# Patient Record
Sex: FEMALE | Race: WHITE | HISPANIC OR LATINO | Employment: FULL TIME | URBAN - METROPOLITAN AREA
[De-identification: names, ages, dates, MRNs, and addresses within clinical notes are randomized per-mention and may not be internally consistent; named-entity substitution may affect disease eponyms.]

---

## 2017-02-15 ENCOUNTER — MYC MEDICAL ADVICE (OUTPATIENT)
Dept: INTERNAL MEDICINE | Facility: CLINIC | Age: 62
End: 2017-02-15

## 2017-03-20 ENCOUNTER — OFFICE VISIT (OUTPATIENT)
Dept: INTERNAL MEDICINE | Facility: CLINIC | Age: 62
End: 2017-03-20
Payer: COMMERCIAL

## 2017-03-20 VITALS
SYSTOLIC BLOOD PRESSURE: 110 MMHG | HEART RATE: 80 BPM | OXYGEN SATURATION: 99 % | WEIGHT: 135 LBS | DIASTOLIC BLOOD PRESSURE: 78 MMHG | TEMPERATURE: 98 F | BODY MASS INDEX: 24.84 KG/M2 | HEIGHT: 62 IN

## 2017-03-20 DIAGNOSIS — J45.20 MILD INTERMITTENT ASTHMA WITHOUT COMPLICATION: Primary | ICD-10-CM

## 2017-03-20 DIAGNOSIS — E03.9 ACQUIRED HYPOTHYROIDISM: ICD-10-CM

## 2017-03-20 PROCEDURE — 99213 OFFICE O/P EST LOW 20 MIN: CPT | Performed by: INTERNAL MEDICINE

## 2017-03-20 RX ORDER — MONTELUKAST SODIUM 10 MG/1
10 TABLET ORAL AT BEDTIME
Qty: 30 TABLET | Refills: 1 | Status: SHIPPED | OUTPATIENT
Start: 2017-03-20 | End: 2017-08-02

## 2017-03-20 RX ORDER — LEVOTHYROXINE SODIUM 88 UG/1
88 TABLET ORAL DAILY
Qty: 90 TABLET | Refills: 3 | Status: SHIPPED | OUTPATIENT
Start: 2017-03-20 | End: 2018-04-03

## 2017-03-20 NOTE — NURSING NOTE
"Chief Complaint   Patient presents with     Asthma       Initial /78  Pulse 80  Temp 98  F (36.7  C) (Oral)  Ht 5' 2\" (1.575 m)  Wt 135 lb (61.2 kg)  LMP 12/01/2007  SpO2 99%  BMI 24.69 kg/m2 Estimated body mass index is 24.69 kg/(m^2) as calculated from the following:    Height as of this encounter: 5' 2\" (1.575 m).    Weight as of this encounter: 135 lb (61.2 kg).  Medication Reconciliation: complete    "

## 2017-03-20 NOTE — PROGRESS NOTES
SUBJECTIVE:                                                    Su Tinoco is a 61 year old female who presents to clinic today for the following health issues:    Asthma: she stopped her hydroxyzine because she was reading it can be related to dementia. She is very worried about this. She is taking zyrtec but not helping as much. She has some allergy symptoms also despite the zyrtec with some postnasal drainage.   Her symptoms are not severe.   She would like to avoid steroid inhaler if possible.     Patient Active Problem List   Diagnosis     Allergic rhinitis     Hypothyroidism     Migraine     Mild intermittent asthma     CARDIOVASCULAR SCREENING; LDL GOAL LESS THAN 160     BRONWYN (generalized anxiety disorder)     Spinal stenosis of lumbar region     Spondylolisthesis at L5-S1 level     Trochanteric bursitis of right hip     Controlled substance agreement signed     Current Outpatient Prescriptions   Medication Sig Dispense Refill     levothyroxine (SYNTHROID/LEVOTHROID) 88 MCG tablet Take 1 tablet (88 mcg) by mouth daily 90 tablet 3     traMADol (ULTRAM) 50 MG tablet 1 tablet every 6-8 hours as needed 75 tablet 1     omeprazole (PRILOSEC) 20 MG capsule Take 1 capsule (20 mg) by mouth 2 times daily 180 capsule 0     traZODone (DESYREL) 100 MG tablet Take 1.5 tablets (150 mg) by mouth At Bedtime 135 tablet 3     buPROPion (WELLBUTRIN XL) 300 MG 24 hr tablet Take 1 tablet (300 mg) by mouth every morning 90 tablet 3     EPINEPHrine (EPIPEN) 0.3 MG/0.3ML injection Inject 0.3 mLs (0.3 mg) into the muscle once as needed for anaphylaxis 0.6 mL 11     PREMARIN vaginal cream   1     albuterol (ALBUTEROL) 108 (90 BASE) MCG/ACT inhaler Inhale 1-2 puffs into the lungs every 6 hours as needed 2 Inhaler 11      Reviewed and updated as needed this visit by clinical staff  Tobacco  Allergies  Meds  Problems  Med Hx  Surg Hx  Fam Hx  Soc Hx        Reviewed and updated as needed this visit by Provider         ROS:  No  "fever, chills, chest pain, sinus pain    OBJECTIVE:                                                    /78  Pulse 80  Temp 98  F (36.7  C) (Oral)  Ht 5' 2\" (1.575 m)  Wt 135 lb (61.2 kg)  LMP 12/01/2007  SpO2 99%  BMI 24.69 kg/m2  Body mass index is 24.69 kg/(m^2).    Lungs: mild decrease breath sounds and mild delayed expiration with minimal wheezes with forced expiration       ASSESSMENT/PLAN:                                                            1. Mild intermittent asthma without complication  Recommend add singulair. If not controlled better would then go back with steroid inhaler.   - montelukast (SINGULAIR) 10 MG tablet; Take 1 tablet (10 mg) by mouth At Bedtime  Dispense: 30 tablet; Refill: 1    2. Acquired hypothyroidism  Refill due  - levothyroxine (SYNTHROID/LEVOTHROID) 88 MCG tablet; Take 1 tablet (88 mcg) by mouth daily  Dispense: 90 tablet; Refill: 3        Jessica Smith MD  [facility]    "

## 2017-03-20 NOTE — MR AVS SNAPSHOT
"              After Visit Summary   3/20/2017    Su Tinoco    MRN: [id number]           Patient Information     Date Of Birth          1955        Visit Information        Provider Department      3/20/2017 4:40 PM Jessica Smith MD [facility]        Today's Diagnoses     Mild intermittent asthma without complication    -  1    Acquired hypothyroidism           Follow-ups after your visit        Who to contact     If you have questions or need follow up information about today's clinic visit or your schedule please contact [facility] directly at [phone].  Normal or non-critical lab and imaging results will be communicated to you by NEBOTRADEhart, letter or phone within 4 business days after the clinic has received the results. If you do not hear from us within 7 days, please contact the clinic through [employer] or phone. If you have a critical or abnormal lab result, we will notify you by phone as soon as possible.  Submit refill requests through Selftrade or call your pharmacy and they will forward the refill request to us. Please allow 3 business days for your refill to be completed.          Additional Information About Your Visit        MyChart Information     Sel[employer] gives you secure access to your electronic health record. If you see a primary care provider, you can also send messages to your care team and make appointments. If you have questions, please call your primary care clinic.  If you do not have a primary care provider, please call [phone] and they will assist you.        Care EveryWhere ID     This is your Care EveryWhere ID. This could be used by other organizations to access your [place] medical records  [id number]        Your Vitals Were     Pulse Temperature Height Last Period Pulse Oximetry BMI (Body Mass Index)    80 98  F (36.7  C) (Oral) 5' 2\" (1.575 m) 12/01/2007 99% 24.69 kg/m2       Blood Pressure from Last 3 Encounters:   03/20/17 110/78 "   08/18/16 102/78   08/11/16 100/80    Weight from Last 3 Encounters:   03/20/17 135 lb (61.2 kg)   08/18/16 130 lb (59 kg)   08/11/16 130 lb (59 kg)              Today, you had the following     No orders found for display         Today's Medication Changes          These changes are accurate as of: 3/20/17  5:20 PM.  If you have any questions, ask your nurse or doctor.               Start taking these medicines.        Dose/Directions    montelukast 10 MG tablet   Commonly known as:  SINGULAIR   Used for:  Mild intermittent asthma without complication   Started by:  Jessica Smith MD        Dose:  10 mg   Take 1 tablet (10 mg) by mouth At Bedtime   Quantity:  30 tablet   Refills:  1         Stop taking these medicines if you haven't already. Please contact your care team if you have questions.     hydrOXYzine 25 MG tablet   Commonly known as:  ATARAX   Stopped by:  Jessica Smith MD                Where to get your medicines      These medications were sent to [facility] [place] [id number] [facility]  [address] [facility] [address]     Phone:  [phone]     levothyroxine 88 MCG tablet         These medications were sent to [facility] [address] [place] [address] [address] AT [place] [address] & [facility]  [address] [facility] [place] [address]     Phone:  [phone]     montelukast 10 MG tablet                Primary Care Provider Office Phone # Fax #    Jessica Smith [phone]       [facility] [address]  [facility] [address]        Thank you!     Thank you for choosing [facility]  for your care. Our goal is always to provide you with excellent care. Hearing back from our patients is one way we can continue to improve our services. Please take a few minutes to complete the written survey that you may receive in the mail after your visit with us. Thank you!             Your Updated Medication List -  Protect others around you: Learn how to safely use, store and throw away your medicines at www.disposemymeds.org.          This list is accurate as of: 3/20/17  5:20 PM.  Always use your most recent med list.                   Brand Name Dispense Instructions for use    albuterol 108 (90 BASE) MCG/ACT Inhaler    albuterol    2 Inhaler    Inhale 1-2 puffs into the lungs every 6 hours as needed       buPROPion 300 MG 24 hr tablet    WELLBUTRIN XL    90 tablet    Take 1 tablet (300 mg) by mouth every morning       EPINEPHrine 0.3 MG/0.3ML injection     0.6 mL    Inject 0.3 mLs (0.3 mg) into the muscle once as needed for anaphylaxis       levothyroxine 88 MCG tablet    SYNTHROID/LEVOTHROID    90 tablet    Take 1 tablet (88 mcg) by mouth daily       montelukast 10 MG tablet    SINGULAIR    30 tablet    Take 1 tablet (10 mg) by mouth At Bedtime       omeprazole 20 MG CR capsule    priLOSEC    180 capsule    Take 1 capsule (20 mg) by mouth 2 times daily       PREMARIN cream   Generic drug:  conjugated estrogens          traMADol 50 MG tablet    ULTRAM    75 tablet    1 tablet every 6-8 hours as needed       traZODone 100 MG tablet    DESYREL    135 tablet    Take 1.5 tablets (150 mg) by mouth At Bedtime

## 2017-03-24 ASSESSMENT — ASTHMA QUESTIONNAIRES: ACT_TOTALSCORE: 14

## 2017-05-11 ENCOUNTER — HOSPITAL ENCOUNTER (OUTPATIENT)
Dept: ULTRASOUND IMAGING | Facility: CLINIC | Age: 62
Discharge: HOME | End: 2017-05-11
Attending: OBSTETRICS & GYNECOLOGY | Admitting: OBSTETRICS & GYNECOLOGY
Payer: COMMERCIAL

## 2017-05-11 ENCOUNTER — HOSPITAL ENCOUNTER (OUTPATIENT)
Dept: MAMMOGRAPHY | Facility: CLINIC | Age: 62
End: 2017-05-11
Attending: OBSTETRICS & GYNECOLOGY
Payer: COMMERCIAL

## 2017-05-11 DIAGNOSIS — R92.8 ABNORMAL MAMMOGRAM: ICD-10-CM

## 2017-05-11 PROCEDURE — G0279 TOMOSYNTHESIS, MAMMO: HCPCS

## 2017-05-11 PROCEDURE — 76642 ULTRASOUND BREAST LIMITED: CPT | Mod: RT

## 2017-05-12 DIAGNOSIS — M43.17 SPONDYLOLISTHESIS AT L5-S1 LEVEL: ICD-10-CM

## 2017-05-12 DIAGNOSIS — M48.061 SPINAL STENOSIS OF LUMBAR REGION: ICD-10-CM

## 2017-05-12 DIAGNOSIS — Z79.899 CONTROLLED SUBSTANCE AGREEMENT SIGNED: ICD-10-CM

## 2017-05-12 RX ORDER — TRAMADOL HYDROCHLORIDE 50 MG/1
TABLET ORAL
Qty: 75 TABLET | Refills: 1 | Status: SHIPPED | OUTPATIENT
Start: 2017-05-12 | End: 2018-02-05

## 2017-05-12 NOTE — TELEPHONE ENCOUNTER
Tramadol      Last Written Prescription Date:  09/27/16  Last Fill Quantity: 75,   # refills: 1  Last Office Visit with [facility] or  Health prescribing provider: 03/20/17  Future Office visit:       Routing refill request to provider for review/approval because:  Drug not on the [facility] or [facility] refill protocol or controlled substance

## 2017-07-07 DIAGNOSIS — F41.1 GAD (GENERALIZED ANXIETY DISORDER): ICD-10-CM

## 2017-07-07 DIAGNOSIS — G47.00 INSOMNIA, UNSPECIFIED: ICD-10-CM

## 2017-07-07 RX ORDER — TRAZODONE HYDROCHLORIDE 100 MG/1
150 TABLET ORAL AT BEDTIME
Qty: 135 TABLET | Refills: 3 | Status: SHIPPED | OUTPATIENT
Start: 2017-07-07 | End: 2018-10-30

## 2017-07-07 RX ORDER — BUPROPION HYDROCHLORIDE 300 MG/1
300 TABLET ORAL EVERY MORNING
Qty: 90 TABLET | Refills: 3 | Status: SHIPPED | OUTPATIENT
Start: 2017-07-07 | End: 2018-09-04

## 2017-07-07 NOTE — TELEPHONE ENCOUNTER
Pt past due for labs/yearly appt      Trazodone    Last Written Prescription Date: 4/25/16  Last Fill Quantity: 135; # refills: 3  Last Office Visit with [facility] or  Health prescribing provider:  3/20/17        Last PHQ-9 score on record= No flowsheet data found.    Lab Results   Component Value Date    AST 29 03/15/2008     Lab Results   Component Value Date    ALT 17 03/15/2008       Labs showing if normal/abnormal  Lab Results   Component Value Date    AST 29 03/15/2008    ALT 17 03/15/2008     Bupropion        Last Written Prescription Date: 4/25/16  Last Fill Quantity: 90; # refills: 3  Last Office Visit with [facility] or [facility] prescribing provider:  3/20/17        Last PHQ-9 score on record= No flowsheet data found.    Lab Results   Component Value Date    AST 29 03/15/2008     Lab Results   Component Value Date    ALT 17 03/15/2008       Labs showing if normal/abnormal  Lab Results   Component Value Date    AST 29 03/15/2008    ALT 17 03/15/2008

## 2017-08-01 ENCOUNTER — OFFICE VISIT (OUTPATIENT)
Dept: URGENT CARE | Facility: URGENT CARE | Age: 62
End: 2017-08-01
Payer: COMMERCIAL

## 2017-08-01 VITALS
DIASTOLIC BLOOD PRESSURE: 70 MMHG | SYSTOLIC BLOOD PRESSURE: 120 MMHG | TEMPERATURE: 97.8 F | OXYGEN SATURATION: 98 % | HEART RATE: 70 BPM

## 2017-08-01 DIAGNOSIS — T14.8XXA PUNCTURE WOUND: Primary | ICD-10-CM

## 2017-08-01 PROCEDURE — 90714 TD VACC NO PRESV 7 YRS+ IM: CPT | Performed by: PHYSICIAN ASSISTANT

## 2017-08-01 PROCEDURE — 99213 OFFICE O/P EST LOW 20 MIN: CPT | Performed by: PHYSICIAN ASSISTANT

## 2017-08-01 RX ORDER — CEPHALEXIN 500 MG/1
500 CAPSULE ORAL 3 TIMES DAILY
Qty: 30 CAPSULE | Refills: 0 | Status: SHIPPED | OUTPATIENT
Start: 2017-08-01 | End: 2017-09-14

## 2017-08-01 NOTE — PROGRESS NOTES
SUBJECTIVE:  Chief Complaint   Patient presents with     Imm/Inj     pt states she need a tetnus shot, due to a gerardo tumb tack x this morning      Su Tinoco is a 62 year old female presents with a chief complaint of left foot puncture wound.  The injury occurred 1 day(s) ago.   The injury happened while at home. How: punture wound.  The patient complained of mild pain  and has not had decreased ROM.  Pain exacerbated by weight-bearing.  Relieved by rest.  She treated it initially with no therapy. This is the first time this type of injury has occurred to this patient.     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      Mild intermittent asthma     sees allergist     Other specified disorders of thyroid      Spinal stenosis of lumbar region     L5, S1     Spondylolisthesis at L5-S1 level      Sprain of unspecified site of back      Trochanteric bursitis of right hip      Unspecified hypothyroidism      Allergies   Allergen Reactions     Nuts Anaphylaxis     peanuts, nara nuts     Compazine      Lock jaw, vision problems and shakes     Erythromycin GI Disturbance     Prochlorperazine      Jaw spasm and vision loss     Social History   Substance Use Topics     Smoking status: Never Smoker     Smokeless tobacco: Never Used     Alcohol use Yes      Comment: rarely- once a month       ROS:  CONSTITUTIONAL:NEGATIVE for fever, chills, change in weight  INTEGUMENTARY/SKIN: POSITIVE for puncture wound  MUSCULOSKELETAL: positive for tenderness of bottom of foot  NEURO: NEGATIVE for weakness, dizziness or paresthesias    EXAM:   /70 (BP Location: Left arm, Patient Position: Chair, Cuff Size: Adult Regular)  Pulse 70  Temp 97.8  F (36.6  C) (Oral)  LMP 12/01/2007  SpO2 98%  Gen: healthy,alert,no distress  Extremity: foot has point tenderness .   There is not compromise to the distal circulation.  Pulses are +2 and CRT is  brisk  GENERAL APPEARANCE: healthy, alert and no distress  EXTREMITIES: peripheral pulses normal  MS: no gross deformities noted, no evidence of inflammation in joints, FROM in all extremities.  SKIN: Positive for small puncture wound  NEURO: Normal strength and tone, sensory exam grossly normal, mentation intact and speech normal      ASSESSMENT/PLAN:      ICD-10-CM    1. Puncture wound T14.8 TD PRESERV FREE >=7 YRS ADS IM     cephALEXin (KEFLEX) 500 MG capsule       Foot soaks  Motrin  Follow up as needed

## 2017-08-01 NOTE — MR AVS SNAPSHOT
After Visit Summary   8/1/2017    Su Tinoco    MRN: [id number]           Patient Information     Date Of Birth          1955        Visit Information        Provider Department      8/1/2017 2:05 PM Turner Perez PA-C [facility]        Today's Diagnoses     Puncture wound    -  1       Follow-ups after your visit        Your next 10 appointments already scheduled     Sep 14, 2017  8:00 AM CDT   [facility] Visit with Jessica Smith MD   [facility]    [address]  [facility] [id number] [phone]           Bring a current list of meds and any records pertaining to this visit. For Physicals, please bring immunization records and any forms needing to be filled out. Please arrive 10 minutes early to complete paperwork.              Who to contact     If you have questions or need follow up information about today's clinic visit or your schedule please contact [facility] directly at [phone].  Normal or non-critical lab and imaging results will be communicated to you by ARKeXhart, letter or phone within 4 business days after the clinic has received the results. If you do not hear from us within 7 days, please contact the clinic through logtrustt or phone. If you have a critical or abnormal lab result, we will notify you by phone as soon as possible.  Submit refill requests through OONi or call your pharmacy and they will forward the refill request to us. Please allow 3 business days for your refill to be completed.          Additional Information About Your Visit        ARKeXhart Information     OONi gives you secure access to your electronic health record. If you see a primary care provider, you can also send messages to your care team and make appointments. If you have questions, please call your primary care clinic.  If you do not have a primary care provider, please call  [phone] and they will assist you.        Care EveryWhere ID     This is your Care EveryWhere ID. This could be used by other organizations to access your [place] medical records  [id number]        Your Vitals Were     Pulse Temperature Last Period Pulse Oximetry          70 97.8  F (36.6  C) (Oral) 12/01/2007 98%         Blood Pressure from Last 3 Encounters:   08/01/17 120/70   03/20/17 110/78   08/18/16 102/78    Weight from Last 3 Encounters:   03/20/17 135 lb (61.2 kg)   08/18/16 130 lb (59 kg)   08/11/16 130 lb (59 kg)              We Performed the Following     TD PRESERV FREE >=7 YRS ADS IM          Today's Medication Changes          These changes are accurate as of: 8/1/17  3:37 PM.  If you have any questions, ask your nurse or doctor.               Start taking these medicines.        Dose/Directions    * cephALEXin 500 MG capsule   Commonly known as:  KEFLEX   Used for:  Puncture wound   Started by:  Turner Perez PA-C        Dose:  500 mg   Take 1 capsule (500 mg) by mouth 3 times daily   Quantity:  30 capsule   Refills:  0       * cephALEXin 500 MG capsule   Commonly known as:  KEFLEX   Used for:  Puncture wound   Started by:  Turner Perez PA-C        Dose:  500 mg   Take 1 capsule (500 mg) by mouth 3 times daily   Quantity:  30 capsule   Refills:  0       * Notice:  This list has 2 medication(s) that are the same as other medications prescribed for you. Read the directions carefully, and ask your doctor or other care provider to review them with you.         Where to get your medicines      Some of these will need a paper prescription and others can be bought over the counter.  Ask your nurse if you have questions.     Bring a paper prescription for each of these medications     cephALEXin 500 MG capsule    cephALEXin 500 MG capsule                Primary Care Provider Office Phone # Fax #    Jessica Smith [phone]       [facility] [address]  [facility] [address]        Equal Access to Services     [facility] : Hadii bruno castillo tanya Easley, wafarihada luqadaha, qaybta kaalmateressa hernandez, christina ramirezkristelanton nicholson. So [facility] [phone].    ATENCIÓN: Si habla español, tiene a cole disposición servicios gratuitos de asistencia lingüística. Meyame al [phone].    We comply with applicable federal civil rights laws and [place] laws. We do not discriminate on the basis of race, color, national origin, age, disability sex, sexual orientation or gender identity.            Thank you!     Thank you for choosing [facility]  for your care. Our goal is always to provide you with excellent care. Hearing back from our patients is one way we can continue to improve our services. Please take a few minutes to complete the written survey that you may receive in the mail after your visit with us. Thank you!             Your Updated Medication List - Protect others around you: Learn how to safely use, store and throw away your medicines at www.disposemymeds.org.          This list is accurate as of: 8/1/17  3:37 PM.  Always use your most recent med list.                   Brand Name Dispense Instructions for use Diagnosis    albuterol 108 (90 BASE) MCG/ACT Inhaler    albuterol    2 Inhaler    Inhale 1-2 puffs into the lungs every 6 hours as needed    Mild intermittent asthma, uncomplicated       buPROPion 300 MG 24 hr tablet    WELLBUTRIN XL    90 tablet    Take 1 tablet (300 mg) by mouth every morning    BRONWYN (generalized anxiety disorder)       * cephALEXin 500 MG capsule    KEFLEX    30 capsule    Take 1 capsule (500 mg) by mouth 3 times daily    Puncture wound       * cephALEXin 500 MG capsule    KEFLEX    30 capsule    Take 1 capsule (500 mg) by mouth 3 times daily    Puncture wound       EPINEPHrine 0.3 MG/0.3ML injection 2-pack    EPIPEN/ADRENACLICK/or ANY BX GENERIC EQUIV    0.6 mL    Inject 0.3 mLs (0.3 mg) into the  muscle once as needed for anaphylaxis    Anaphylactic reaction due to peanuts, initial encounter       levothyroxine 88 MCG tablet    SYNTHROID/LEVOTHROID    90 tablet    Take 1 tablet (88 mcg) by mouth daily    Acquired hypothyroidism       montelukast 10 MG tablet    SINGULAIR    30 tablet    Take 1 tablet (10 mg) by mouth At Bedtime    Mild intermittent asthma without complication       omeprazole 20 MG CR capsule    priLOSEC    180 capsule    Take 1 capsule (20 mg) by mouth 2 times daily    Gastritis       PREMARIN cream   Generic drug:  conjugated estrogens           traMADol 50 MG tablet    ULTRAM    75 tablet    1 tablet every 6-8 hours as needed    Spinal stenosis of lumbar region, Spondylolisthesis at L5-S1 level       traZODone 100 MG tablet    DESYREL    135 tablet    Take 1.5 tablets (150 mg) by mouth At Bedtime    Insomnia, unspecified       * Notice:  This list has 2 medication(s) that are the same as other medications prescribed for you. Read the directions carefully, and ask your doctor or other care provider to review them with you.

## 2017-08-01 NOTE — NURSING NOTE
"Chief Complaint   Patient presents with     Imm/Inj     pt states she need a tetnus shot, due to a gerardo tumb tack x this morning        Initial /70 (BP Location: Left arm, Patient Position: Chair, Cuff Size: Adult Regular)  Pulse 70  Temp 97.8  F (36.6  C) (Oral)  LMP 12/01/2007  SpO2 98% Estimated body mass index is 24.69 kg/(m^2) as calculated from the following:    Height as of 3/20/17: 5' 2\" (1.575 m).    Weight as of 3/20/17: 135 lb (61.2 kg).  Medication Reconciliation: complete    "

## 2017-08-02 ENCOUNTER — TELEPHONE (OUTPATIENT)
Dept: INTERNAL MEDICINE | Facility: CLINIC | Age: 62
End: 2017-08-02

## 2017-08-02 DIAGNOSIS — J45.20 MILD INTERMITTENT ASTHMA WITHOUT COMPLICATION: ICD-10-CM

## 2017-08-02 RX ORDER — MONTELUKAST SODIUM 10 MG/1
10 TABLET ORAL AT BEDTIME
Qty: 90 TABLET | Refills: 0 | Status: SHIPPED | OUTPATIENT
Start: 2017-08-02 | End: 2017-12-09

## 2017-08-02 RX ORDER — MONTELUKAST SODIUM 10 MG/1
10 TABLET ORAL AT BEDTIME
Qty: 30 TABLET | Refills: 0 | Status: SHIPPED | OUTPATIENT
Start: 2017-08-02 | End: 2017-08-02

## 2017-08-02 NOTE — TELEPHONE ENCOUNTER
Singulair refill request. . Pt states this helps. When she stops it, her symptoms returned, but not right away.     She needs it today. Will fax in but will route to Dr Smiht as DAYLIN.          Sent to [facility], then to mail order.   Last Written Prescription Date: 3/20/17  Last Fill Quantity: 30, # refills: 1      Last [facility] Visit with [facility] or [facility] prescribing provider:  3/20/17     Future Office Visit:    Next 5 appointments (look out 90 days)     Sep 14, 2017  8:00 AM CDT   [facility]    [address]  [facility] [address] [phone]                   Date of Last Asthma Action Plan Letter:   Asthma Action Plan Q1 Year    Topic Date Due     Asthma Action Plan - yearly  04/25/2017      Asthma Control Test:   ACT Total Scores 3/23/2017   ACT TOTAL SCORE -   ASTHMA ER VISITS -   ASTHMA HOSPITALIZATIONS -   ACT TOTAL SCORE (Goal Greater than or Equal to 20) 14   In the past 12 months, how many times did you visit the emergency room for your asthma without being admitted to the hospital? 0   In the past 12 months, how many times were you hospitalized overnight because of your asthma? 0       Date of Last Spirometry Test:   No results found for this or any previous visit.

## 2017-09-04 DIAGNOSIS — J45.20 MILD INTERMITTENT ASTHMA WITHOUT COMPLICATION: ICD-10-CM

## 2017-09-05 RX ORDER — MONTELUKAST SODIUM 10 MG/1
TABLET ORAL
Qty: 30 TABLET | Refills: 0 | OUTPATIENT
Start: 2017-09-05

## 2017-09-14 ENCOUNTER — OFFICE VISIT (OUTPATIENT)
Dept: INTERNAL MEDICINE | Facility: CLINIC | Age: 62
End: 2017-09-14
Payer: COMMERCIAL

## 2017-09-14 VITALS
HEART RATE: 74 BPM | OXYGEN SATURATION: 99 % | SYSTOLIC BLOOD PRESSURE: 92 MMHG | WEIGHT: 136 LBS | HEIGHT: 62 IN | RESPIRATION RATE: 16 BRPM | TEMPERATURE: 97.9 F | BODY MASS INDEX: 25.03 KG/M2 | DIASTOLIC BLOOD PRESSURE: 66 MMHG

## 2017-09-14 DIAGNOSIS — Z12.11 SPECIAL SCREENING FOR MALIGNANT NEOPLASMS, COLON: ICD-10-CM

## 2017-09-14 DIAGNOSIS — E03.9 ACQUIRED HYPOTHYROIDISM: ICD-10-CM

## 2017-09-14 DIAGNOSIS — Z00.00 ENCOUNTER FOR ROUTINE ADULT HEALTH EXAMINATION WITHOUT ABNORMAL FINDINGS: Primary | ICD-10-CM

## 2017-09-14 DIAGNOSIS — F41.1 GAD (GENERALIZED ANXIETY DISORDER): ICD-10-CM

## 2017-09-14 DIAGNOSIS — J45.20 MILD INTERMITTENT ASTHMA WITHOUT COMPLICATION: ICD-10-CM

## 2017-09-14 LAB
ANION GAP SERPL CALCULATED.3IONS-SCNC: 6 MMOL/L (ref 3–14)
BUN SERPL-MCNC: 19 MG/DL (ref 7–30)
CALCIUM SERPL-MCNC: 9.2 MG/DL (ref 8.5–10.1)
CHLORIDE SERPL-SCNC: 105 MMOL/L (ref 94–109)
CHOLEST SERPL-MCNC: 186 MG/DL
CO2 SERPL-SCNC: 29 MMOL/L (ref 20–32)
CREAT SERPL-MCNC: 0.86 MG/DL (ref 0.52–1.04)
GFR SERPL CREATININE-BSD FRML MDRD: 67 ML/MIN/1.7M2
GLUCOSE SERPL-MCNC: 81 MG/DL (ref 70–99)
HDLC SERPL-MCNC: 100 MG/DL
LDLC SERPL CALC-MCNC: 74 MG/DL
NONHDLC SERPL-MCNC: 86 MG/DL
POTASSIUM SERPL-SCNC: 4.3 MMOL/L (ref 3.4–5.3)
SODIUM SERPL-SCNC: 140 MMOL/L (ref 133–144)
TRIGL SERPL-MCNC: 59 MG/DL
TSH SERPL DL<=0.005 MIU/L-ACNC: 0.68 MU/L (ref 0.4–4)

## 2017-09-14 PROCEDURE — 99396 PREV VISIT EST AGE 40-64: CPT | Performed by: INTERNAL MEDICINE

## 2017-09-14 PROCEDURE — 36415 COLL VENOUS BLD VENIPUNCTURE: CPT | Performed by: INTERNAL MEDICINE

## 2017-09-14 PROCEDURE — 80061 LIPID PANEL: CPT | Performed by: INTERNAL MEDICINE

## 2017-09-14 PROCEDURE — 80048 BASIC METABOLIC PNL TOTAL CA: CPT | Performed by: INTERNAL MEDICINE

## 2017-09-14 PROCEDURE — 84443 ASSAY THYROID STIM HORMONE: CPT | Performed by: INTERNAL MEDICINE

## 2017-09-14 RX ORDER — SACCHAROMYCES BOULARDII 250 MG
250 CAPSULE ORAL 2 TIMES DAILY
COMMUNITY
End: 2018-03-09

## 2017-09-14 ASSESSMENT — ANXIETY QUESTIONNAIRES
GAD7 TOTAL SCORE: 0
GAD7 TOTAL SCORE: 0
2. NOT BEING ABLE TO STOP OR CONTROL WORRYING: NOT AT ALL
GAD7 TOTAL SCORE: 0
7. FEELING AFRAID AS IF SOMETHING AWFUL MIGHT HAPPEN: NOT AT ALL
7. FEELING AFRAID AS IF SOMETHING AWFUL MIGHT HAPPEN: NOT AT ALL
5. BEING SO RESTLESS THAT IT IS HARD TO SIT STILL: NOT AT ALL
4. TROUBLE RELAXING: NOT AT ALL
1. FEELING NERVOUS, ANXIOUS, OR ON EDGE: NOT AT ALL
3. WORRYING TOO MUCH ABOUT DIFFERENT THINGS: NOT AT ALL
6. BECOMING EASILY ANNOYED OR IRRITABLE: NOT AT ALL

## 2017-09-14 NOTE — PROGRESS NOTES
SUBJECTIVE:   CC: Su Tinoco is an 62 year old woman who presents for preventive health visit.     Physical   Annual:     Getting at least 3 servings of Calcium per day::  Yes    Bi-annual eye exam::  Yes    Dental care twice a year::  Yes (3 times )    Sleep apnea or symptoms of sleep apnea::  None    Diet::  Other (wt watchers)    Frequency of exercise::  4-5 days/week    Duration of exercise::  Greater than 60 minutes    Taking medications regularly::  No    Barriers to taking medications::  Other    Additional concerns today::  No  Answers for HPI/ROS submitted by the patient on 9/14/2017   BRONWYN 7 TOTAL SCORE: 0      Please abstract the following data from this visit with this patient into the appropriate field in Epic:    Pap smear done on this date: 04/2016 (approximately), by this group: [facility], results were Negative.       Current concerns:   Her asthma has been bothering her more this summer. No clear triggers but may have been allergies. She restarted Singulair recently with improvement.     Today's PHQ-2 Score: PHQ-2 ( 1999 Pfizer) 3/20/2017   Q1: Little interest or pleasure in doing things 0   Q2: Feeling down, depressed or hopeless 0   PHQ-2 Score 0   Q1: Little interest or pleasure in doing things -   Q2: Feeling down, depressed or hopeless -   PHQ-2 Score -       Abuse: Current or Past(Physical, Sexual or Emotional)- No  Do you feel safe in your environment - Yes    Social History   Substance Use Topics     Smoking status: Never Smoker     Smokeless tobacco: Never Used     Alcohol use Yes      Comment: rarely- once a month     The patient does not drink >3 drinks per day nor >7 drinks per week.    Reviewed orders with patient.  Reviewed health maintenance and updated orders accordingly - Yes      Patient over age 50, mutual decision to screen reflected in health maintenance.      Pertinent mammograms are reviewed under the imaging tab.  History of abnormal Pap smear: NO - age 30-65 PAP  every 5 years with negative HPV co-testing recommended    Reviewed and updated as needed this visit by clinical staff         Reviewed and updated as needed this visit by Provider    Patient Active Problem List   Diagnosis     Allergic rhinitis     Hypothyroidism     Migraine     Mild intermittent asthma     CARDIOVASCULAR SCREENING; LDL GOAL LESS THAN 160     BRONWYN (generalized anxiety disorder)     Spinal stenosis of lumbar region     Spondylolisthesis at L5-S1 level     Trochanteric bursitis of right hip     Controlled substance agreement signed     Current Outpatient Prescriptions   Medication Sig Dispense Refill     saccharomyces boulardii (FLORASTOR) 250 MG capsule Take 250 mg by mouth 2 times daily       montelukast (SINGULAIR) 10 MG tablet Take 1 tablet (10 mg) by mouth At Bedtime 90 tablet 0     traZODone (DESYREL) 100 MG tablet Take 1.5 tablets (150 mg) by mouth At Bedtime 135 tablet 3     buPROPion (WELLBUTRIN XL) 300 MG 24 hr tablet Take 1 tablet (300 mg) by mouth every morning 90 tablet 3     traMADol (ULTRAM) 50 MG tablet 1 tablet every 6-8 hours as needed 75 tablet 1     levothyroxine (SYNTHROID/LEVOTHROID) 88 MCG tablet Take 1 tablet (88 mcg) by mouth daily 90 tablet 3     PREMARIN vaginal cream   1     albuterol (ALBUTEROL) 108 (90 BASE) MCG/ACT inhaler Inhale 1-2 puffs into the lungs every 6 hours as needed 2 Inhaler 11     EPINEPHrine (EPIPEN) 0.3 MG/0.3ML injection Inject 0.3 mLs (0.3 mg) into the muscle once as needed for anaphylaxis (Patient not taking: Reported on 9/14/2017) 0.6 mL 11      Review Of Systems:  Skin: saw derm  Eyes: negative  Ears/Nose/Throat: negative  Respiratory:   Cardiovascular: negative  Gastrointestinal: negative  Genitourinary: negative  Musculoskeletal: back pain is stable, comes and goes  Neurologic: negative  Psychiatric: negative  Hematologic/Lymphatic/Immunologic: negative  Endocrine: negative   Gynecologic ros reveals:no breast pain or new or enlarging lumps on self  "exam, no vaginal bleeding, no discharge or pelvic pain    Objective:  Patient alert, in no acute distress  BP 92/66  Pulse 74  Temp 97.9  F (36.6  C) (Oral)  Resp 16  Ht 5' 2\" (1.575 m)  Wt 136 lb (61.7 kg)  LMP 12/01/2007  SpO2 99%  BMI 24.87 kg/m2    HEENT: extraocular movements are intact, pupils equal and reactive to light and accommodation, TMs clear, oropharynx clear  NECK: Neck supple. No adenopathy. Thyroid symmetric, normal size,, Carotids without bruits.  PULMONARY: clear to auscultation  CARDIAC: regular rate and rhythm and no murmurs, clicks, or gallops  PULSES: 2/2 throughout  BACK: no spinal or CVAT  ABDOMINAL: Soft, nontender.  Normal bowel sounds.  No hepatosplenomegaly or abnormal masses  BREAST: No breast masses or tenderness, No axillary masses or tenderness and No galactorrhea  PELVIC: done by gyn  REFLEXES: 2+ throughout  SKIN: unremarkable    BRONWYN-7 SCORE 4/4/2015 4/25/2016 9/14/2017   Total Score 8 - -   Total Score - - 0 (minimal anxiety)   Total Score - 4 0              ASSESSMENT/PLAN:   1. Encounter for routine adult health examination without abnormal findings    - Basic metabolic panel  - Lipid panel reflex to direct LDL    2. Mild intermittent asthma without complication  Some flare, if not improving or worsens more would recommend adding steroid inhaler    3. Acquired hypothyroidism  recheck  - TSH with free T4 reflex    4. BRONWYN (generalized anxiety disorder)  Controlled, continue med    5. Special screening for malignant neoplasms, colon    - GASTROENTEROLOGY ADULT REF PROCEDURE ONLY    COUNSELING:  Reviewed preventive health counseling, as reflected in patient instructions       Osteoporosis Prevention/Bone Health         reports that she has never smoked. She has never used smokeless tobacco.    Estimated body mass index is 24.69 kg/(m^2) as calculated from the following:    Height as of 3/20/17: 5' 2\" (1.575 m).    Weight as of 3/20/17: 135 lb (61.2 kg).         Counseling " Resources:  ATP IV Guidelines  Pooled Cohorts Equation Calculator  Breast Cancer Risk Calculator  FRAX Risk Assessment  ICSI Preventive Guidelines  Dietary Guidelines for Americans, 2010  USDA's MyPlate  ASA Prophylaxis  Lung CA Screening    Jessica Smith MD  [facility]

## 2017-09-14 NOTE — NURSING NOTE
"Chief Complaint   Patient presents with     Physical     Fasting       Initial BP 92/66  Pulse 74  Temp 97.9  F (36.6  C) (Oral)  Resp 16  Ht 5' 2\" (1.575 m)  Wt 136 lb (61.7 kg)  LMP 12/01/2007  SpO2 99%  BMI 24.87 kg/m2 Estimated body mass index is 24.87 kg/(m^2) as calculated from the following:    Height as of this encounter: 5' 2\" (1.575 m).    Weight as of this encounter: 136 lb (61.7 kg).  Medication Reconciliation: raza Hollingsworth, TICO    Pt received flu vaccine at work.    Discussed Health Maintenance:    Patient agreed to schedule Colonoscopy but will discuss with Dr Smith first about recommendation. Order have been place and information given to patient.       "

## 2017-09-14 NOTE — MR AVS SNAPSHOT
After Visit Summary   9/14/2017    Su Tinoco    MRN: [id number]           Patient Information     Date Of Birth          1955        Visit Information        Provider Department      9/14/2017 8:00 AM Jessica Smith MD [facility]        Today's Diagnoses     Encounter for routine adult health examination without abnormal findings    -  1    Acquired hypothyroidism        Special screening for malignant neoplasms, colon          Care Instructions      PREVENTIVE HEALTH RECOMMENDATIONS:     Vaccines: Get a flu shot each year. Get a tetanus shot every 10 years.     Exercise for at least 150 minutes a week (an average of 30 minutes a day, 5 days of the week). This will help you control your weight and prevent disease.    Limit alcohol to one drink per day.    No smoking.     Wear sunscreen to prevent skin cancer.     See your dentist twice a year for an exam and cleaning.    Try to get Calcium 1200 mg total per day. It is best to not take it all at once. Try to get Vitamin D at least 1000 units per day.    BMI or Body Mass Index is a way of indicating weight and health risk for cardiovascular diseases, high blood pressure, diabetes.   Definitions:    Underweight is less than 18.5 and will be associated with health risk.   Normal BMI is 18.5 to 25   Overweight is 25-29   Obesity is 30 or greater   Morbid Obesity is 40 or greater or 35 or greater with diabetes or high blood pressure  Obesity and Morbid Obesity are associated with higher health risks. Lowering calories, exercising more may lower your BMI and even small decreases can have positive impact on lowering health risks.   Your Body mass index is 24.87 kg/(m^2)..             Follow-ups after your visit        Additional Services     GASTROENTEROLOGY ADULT REF PROCEDURE ONLY       Last Lab Result: Creatinine (mg/dL)       Date                     Value                 04/25/2016               0.79             ----------  Body  mass index is 24.87 kg/(m^2).     Needed:  No  Language:  English    Patient will be contacted to schedule procedure.     Please be aware that coverage of these services is subject to the terms and limitations of your health insurance plan.  Call member services at your health plan with any benefit or coverage questions.  Any procedures must be performed at a [place] facility OR coordinated by your clinic's referral office.    Please bring the following with you to your appointment:    (1) Any X-Rays, CTs or MRIs which have been performed.  Contact the facility where they were done to arrange for  prior to your scheduled appointment.    (2) List of current medications   (3) This referral request   (4) Any documents/labs given to you for this referral                  Who to contact     If you have questions or need follow up information about today's clinic visit or your schedule please contact [facility] directly at [phone].  Normal or non-critical lab and imaging results will be communicated to you by MyChart, letter or phone within 4 business days after the clinic has received the results. If you do not hear from us within 7 days, please contact the clinic through [employer] or phone. If you have a critical or abnormal lab result, we will notify you by phone as soon as possible.  Submit refill requests through [employer] or call your pharmacy and they will forward the refill request to us. Please allow 3 business days for your refill to be completed.          Additional Information About Your Visit        MyChart Information     [employer] gives you secure access to your electronic health record. If you see a primary care provider, you can also send messages to your care team and make appointments. If you have questions, please call your primary care clinic.  If you do not have a primary care provider, please call [phone] and they will assist you.        Care EveryWhere ID      "This is your Care EveryWhere ID. This could be used by other organizations to access your [place] medical records  [id number]        Your Vitals Were     Pulse Temperature Respirations Height Last Period Pulse Oximetry    74 97.9  F (36.6  C) (Oral) 16 5' 2\" (1.575 m) 12/01/2007 99%    BMI (Body Mass Index)                   24.87 kg/m2            Blood Pressure from Last 3 Encounters:   09/14/17 92/66   08/01/17 120/70   03/20/17 110/78    Weight from Last 3 Encounters:   09/14/17 136 lb (61.7 kg)   03/20/17 135 lb (61.2 kg)   08/18/16 130 lb (59 kg)              We Performed the Following     Basic metabolic panel     GASTROENTEROLOGY ADULT REF PROCEDURE ONLY     Lipid panel reflex to direct LDL     TSH with free T4 reflex          Today's Medication Changes          These changes are accurate as of: 9/14/17  8:42 AM.  If you have any questions, ask your nurse or doctor.               Stop taking these medicines if you haven't already. Please contact your care team if you have questions.     omeprazole 20 MG CR capsule   Commonly known as:  priLOSEC   Stopped by:  Jessica Smith MD                    Primary Care Provider Office Phone # Fax #    Jessica Smtih [phone]       [address] [facility] [address]  [facility] [address]        Equal Access to Services     [facility] : Hadii bruno castillo hadasho Soomi, waaxda luqadaha, qaybta kaalmada david, christina nicholson. So [facility] [phone].    ATENCIÓN: Si habla español, tiene a cole disposición servicios gratuitos de asistencia lingüística. Llame al [phone].    We comply with applicable federal civil rights laws and [place] laws. We do not discriminate on the basis of race, color, national origin, age, disability sex, sexual orientation or gender identity.            Thank you!     Thank you for choosing [facility]  for your care. Our goal is always to provide you with excellent care. Hearing back from our " patients is one way we can continue to improve our services. Please take a few minutes to complete the written survey that you may receive in the mail after your visit with us. Thank you!             Your Updated Medication List - Protect others around you: Learn how to safely use, store and throw away your medicines at www.disposemymeds.org.          This list is accurate as of: 9/14/17  8:42 AM.  Always use your most recent med list.                   Brand Name Dispense Instructions for use Diagnosis    albuterol 108 (90 BASE) MCG/ACT Inhaler    PROAIR HFA    2 Inhaler    Inhale 1-2 puffs into the lungs every 6 hours as needed    Mild intermittent asthma, uncomplicated       buPROPion 300 MG 24 hr tablet    WELLBUTRIN XL    90 tablet    Take 1 tablet (300 mg) by mouth every morning    BRONWYN (generalized anxiety disorder)       EPINEPHrine 0.3 MG/0.3ML injection 2-pack    EPIPEN/ADRENACLICK/or ANY BX GENERIC EQUIV    0.6 mL    Inject 0.3 mLs (0.3 mg) into the muscle once as needed for anaphylaxis    Anaphylactic reaction due to peanuts, initial encounter       levothyroxine 88 MCG tablet    SYNTHROID/LEVOTHROID    90 tablet    Take 1 tablet (88 mcg) by mouth daily    Acquired hypothyroidism       montelukast 10 MG tablet    SINGULAIR    90 tablet    Take 1 tablet (10 mg) by mouth At Bedtime    Mild intermittent asthma without complication       PREMARIN cream   Generic drug:  conjugated estrogens           saccharomyces boulardii 250 MG capsule    FLORASTOR     Take 250 mg by mouth 2 times daily        traMADol 50 MG tablet    ULTRAM    75 tablet    1 tablet every 6-8 hours as needed    Spinal stenosis of lumbar region, Spondylolisthesis at L5-S1 level       traZODone 100 MG tablet    DESYREL    135 tablet    Take 1.5 tablets (150 mg) by mouth At Bedtime    Insomnia, unspecified

## 2017-09-15 ENCOUNTER — MYC MEDICAL ADVICE (OUTPATIENT)
Dept: INTERNAL MEDICINE | Facility: CLINIC | Age: 62
End: 2017-09-15

## 2017-09-15 ASSESSMENT — ANXIETY QUESTIONNAIRES: GAD7 TOTAL SCORE: 0

## 2017-09-15 ASSESSMENT — ASTHMA QUESTIONNAIRES: ACT_TOTALSCORE: 16

## 2017-09-26 ENCOUNTER — MYC MEDICAL ADVICE (OUTPATIENT)
Dept: INTERNAL MEDICINE | Facility: CLINIC | Age: 62
End: 2017-09-26

## 2017-11-10 ENCOUNTER — TRANSFERRED RECORDS (OUTPATIENT)
Dept: HEALTH INFORMATION MANAGEMENT | Facility: CLINIC | Age: 62
End: 2017-11-10

## 2017-11-11 ENCOUNTER — HEALTH MAINTENANCE LETTER (OUTPATIENT)
Age: 62
End: 2017-11-11

## 2017-11-27 ENCOUNTER — TRANSFERRED RECORDS (OUTPATIENT)
Dept: HEALTH INFORMATION MANAGEMENT | Facility: CLINIC | Age: 62
End: 2017-11-27

## 2017-12-09 DIAGNOSIS — J45.20 MILD INTERMITTENT ASTHMA WITHOUT COMPLICATION: ICD-10-CM

## 2017-12-11 RX ORDER — MONTELUKAST SODIUM 10 MG/1
TABLET ORAL
Qty: 90 TABLET | Refills: 3 | Status: SHIPPED | OUTPATIENT
Start: 2017-12-11 | End: 2018-11-12

## 2017-12-11 NOTE — TELEPHONE ENCOUNTER
Last OV: 09/14/17.     Date of Last Asthma Action Plan Letter:   Asthma Action Plan Q1 Year    Topic Date Due     Asthma Action Plan - yearly  04/25/2017      Asthma Control Test:   ACT Total Scores 9/14/2017   ACT TOTAL SCORE -   ASTHMA ER VISITS -   ASTHMA HOSPITALIZATIONS -   ACT TOTAL SCORE (Goal Greater than or Equal to 20) 16   In the past 12 months, how many times did you visit the emergency room for your asthma without being admitted to the hospital? 0   In the past 12 months, how many times were you hospitalized overnight because of your asthma? 0     Routing refill request to provider for review/approval because:  ACT <20    Please advise, thanks.

## 2018-02-05 ENCOUNTER — TRANSFERRED RECORDS (OUTPATIENT)
Dept: HEALTH INFORMATION MANAGEMENT | Facility: CLINIC | Age: 63
End: 2018-02-05

## 2018-02-05 DIAGNOSIS — M43.17 SPONDYLOLISTHESIS AT L5-S1 LEVEL: ICD-10-CM

## 2018-02-05 DIAGNOSIS — M48.061 SPINAL STENOSIS OF LUMBAR REGION, UNSPECIFIED WHETHER NEUROGENIC CLAUDICATION PRESENT: Primary | ICD-10-CM

## 2018-02-05 RX ORDER — TRAMADOL HYDROCHLORIDE 50 MG/1
TABLET ORAL
Qty: 75 TABLET | Refills: 1 | Status: SHIPPED | OUTPATIENT
Start: 2018-02-05 | End: 2018-12-27

## 2018-02-05 NOTE — TELEPHONE ENCOUNTER
Tramadol      Last Written Prescription Date:  5/12/17  Last Fill Quantity:,75   # refills: 1  Last Office Visit: 9/14/17  Future Office visit:       Routing refill request to provider for review/approval because:  Drug not on the [facility], [facility] or [facility] refill protocol or controlled substance

## 2018-03-09 ENCOUNTER — OFFICE VISIT (OUTPATIENT)
Dept: INTERNAL MEDICINE | Facility: CLINIC | Age: 63
End: 2018-03-09
Payer: COMMERCIAL

## 2018-03-09 VITALS
HEART RATE: 76 BPM | OXYGEN SATURATION: 98 % | BODY MASS INDEX: 25.97 KG/M2 | DIASTOLIC BLOOD PRESSURE: 76 MMHG | SYSTOLIC BLOOD PRESSURE: 118 MMHG | WEIGHT: 142 LBS | TEMPERATURE: 98.3 F | RESPIRATION RATE: 16 BRPM

## 2018-03-09 DIAGNOSIS — Z01.818 PREOP GENERAL PHYSICAL EXAM: Primary | ICD-10-CM

## 2018-03-09 DIAGNOSIS — M19.049 OSTEOARTHRITIS OF FINGER, UNSPECIFIED LATERALITY: ICD-10-CM

## 2018-03-09 PROCEDURE — 99214 OFFICE O/P EST MOD 30 MIN: CPT | Performed by: INTERNAL MEDICINE

## 2018-03-09 PROCEDURE — 93000 ELECTROCARDIOGRAM COMPLETE: CPT | Performed by: INTERNAL MEDICINE

## 2018-03-09 NOTE — PROGRESS NOTES
[place] [address]  [facility] [address]  [phone]  Dept: [phone]    PRE-OP EVALUATION:  Today's date: 3/9/2018    Su Tinoco (: 1955) presents for pre-operative evaluation assessment as requested by Dr. Jennings.  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 Removal of nodules, pinning of fingers to correct deviation    [facility]  Fax number for surgical facility: [phone]  Primary Physician: Jessica Smith  Type of Anesthesia Anticipated: local with MAC  Date or surgery: 3/16/18  Surgery time 11 am  Patient has a Health Care Directive or Living Will:  YES     Preop Questions 3/6/2018   1.  Do you have a history of Heart attack, stroke, stent, coronary bypass surgery, or other heart surgery? No   2.  Do you ever have any pain or discomfort in your chest? No   3.  Do you have a history of  Heart Failure? No   4.   Are you troubled by shortness of breath when:  walking on a level surface, or up a slight hill, or at night? No   5.  Do you currently have a cold, bronchitis or other respiratory infection? No   6.  Do you have a cough, shortness of breath, or wheezing? No   7.  Do you sometimes get pains in the calves of your legs when you walk? No   8. Do you or anyone in your family have previous history of blood clots? No   9.  Do you or does anyone in your family have a serious bleeding problem such as prolonged bleeding following surgeries or cuts? No   10. Have you ever had problems with anemia or been told to take iron pills? No   11. Have you had any abnormal blood loss such as black, tarry or bloody stools, or abnormal vaginal bleeding? No   12. Have you ever had a blood transfusion? No   13. Have you or any of your relatives ever had problems with anesthesia? No   14. Do you have sleep apnea, excessive snoring or  daytime drowsiness? No   15. Do you have any prosthetic heart valves? No   16. Do you have prosthetic joints? No   17. Is there any chance that you may be pregnant? No         HPI:     HPI related to upcoming procedure: she has OA of hands causing nodules right 2nd and 3rd DIP joints with deformities and cysts left 4th DIP.       See problem list for active medical problems.  Problems all longstanding and stable, except as noted/documented.  See ROS for pertinent symptoms related to these conditions.                                                                                                  .    MEDICAL HISTORY:     Patient Active Problem List    Diagnosis Date Noted     Controlled substance agreement signed 04/25/2016     Priority: Medium     Spinal stenosis of lumbar region      Priority: Medium     L5, S1       Spondylolisthesis at L5-S1 level      Priority: Medium     BRONWYN (generalized anxiety disorder) 11/09/2014     Priority: Medium     CARDIOVASCULAR SCREENING; LDL GOAL LESS THAN 160 10/31/2010     Priority: Medium     Mild intermittent asthma      Priority: Medium     Migraine 01/27/2005     Priority: Medium     Problem list name updated by automated process. Provider to review       Allergic rhinitis 07/21/2003     Priority: Medium     Problem list name updated by automated process. Provider to review       Hypothyroidism 07/21/2003     Priority: Medium     Problem list name updated by automated process. 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      Mild intermittent asthma     sees allergist     Other specified disorders of thyroid      Spinal stenosis of lumbar region     L5, S1     Spondylolisthesis at L5-S1 level      Sprain of unspecified site of back      Trochanteric bursitis of right hip      Unspecified hypothyroidism      Past Surgical History:   Procedure Laterality Date      C NONSPECIFIC PROCEDURE      Laparoscopies IUD infection     C NONSPECIFIC PROCEDURE      removal of myxoid tumors fingers     C NONSPECIFIC PROCEDURE      sphincterotomy of anus     C/SECTION, LOW TRANSVERSE      , Low Transverse     TONSILLECTOMY & ADENOIDECTOMY       TUBAL LIGATION       Current Outpatient Prescriptions   Medication Sig Dispense Refill     traMADol (ULTRAM) 50 MG tablet 1 tablet every 6-8 hours as needed 75 tablet 1     montelukast (SINGULAIR) 10 MG tablet TAKE 1 TABLET BY MOUTH AT BEDTIME 90 tablet 3     saccharomyces boulardii (FLORASTOR) 250 MG capsule Take 250 mg by mouth 2 times daily       traZODone (DESYREL) 100 MG tablet Take 1.5 tablets (150 mg) by mouth At Bedtime 135 tablet 3     buPROPion (WELLBUTRIN XL) 300 MG 24 hr tablet Take 1 tablet (300 mg) by mouth every morning 90 tablet 3     levothyroxine (SYNTHROID/LEVOTHROID) 88 MCG tablet Take 1 tablet (88 mcg) by mouth daily 90 tablet 3     EPINEPHrine (EPIPEN) 0.3 MG/0.3ML injection Inject 0.3 mLs (0.3 mg) into the muscle once as needed for anaphylaxis 0.6 mL 11     PREMARIN vaginal cream   1     albuterol (ALBUTEROL) 108 (90 BASE) MCG/ACT inhaler Inhale 1-2 puffs into the lungs every 6 hours as needed 2 Inhaler 11     OTC products: None, except as noted above    Allergies   Allergen Reactions     Nuts Anaphylaxis     peanuts, nara nuts     Compazine      Lock jaw, vision problems and shakes     Erythromycin GI Disturbance     Prochlorperazine      Jaw spasm and vision loss      Latex Allergy: NO    Social History   Substance Use Topics     Smoking status: Never Smoker     Smokeless tobacco: Never Used     Alcohol use Yes      Comment: rarely- once a month     History   Drug Use No       REVIEW OF SYSTEMS:   GENERAL: negative for, fever, chills, weight loss, weight gain  EYES: negative  ENT: negative  RESPIRATORY: No dyspnea on exertion and No cough  CARDIOVASCULAR: negative for, palpitations, tachycardia, irregular heart beat  and chest pain  GI: negative for, nausea, vomiting, abdominal pain, melena and hematochezia  : negative for, dysuria and hematuria  MUSCULOSKELETAL: back pain bothering more  NEUROLOGIC: negative for, headaches, seizures, local weakness, numbness or tingling of hands and numbness or tingling of feet  SKIN: negative  ENDOCRINE: negative         EXAM:   LMP 12/01/2007    Patient is alert, oriented, cooperative in no acute distress.  /76  Pulse 76  Temp 98.3  F (36.8  C) (Oral)  Resp 16  Wt 142 lb (64.4 kg)  LMP 12/01/2007  SpO2 98%  BMI 25.97 kg/m2    HEENT: PERRL, EOMI, TM's are normal. Oropharynx is clear.  NECK: No lymphadenopathy or thyromegaly. Carotid pulses full without bruits.  LUNGS: clear  CV: normal S1, S2 without murmur, S3 or S4 present. Pulses are 2/2 throughout. No JVD.  ABDOMEN: Bowel sounds present, nontender without hepatosplenomegaly. Liver is normal size to percussion.  EXTREMITIES: no edema present, nodules finger joints, cysts left finger  NEUROLOGIC: Cranial nerves II-XII intact, reflexes 2/4 throughout, strength 5/5, sensation grossly intact, gait normal.  SKIN: without rashes or significant lesions     DIAGNOSTICS:   EKG: appears normal, NSR, normal axis, normal intervals, no acute ST/T changes c/w ischemia, no LVH by voltage criteria, unchanged from previous tracings    Recent Labs   Lab Test  09/14/17   0853  04/25/16   0828   06/22/12   0920   HGB   --    --    --   13.3   NA  140  142   < >   --    POTASSIUM  4.3  4.1   < >   --    CR  0.86  0.79   < >   --     < > = values in this interval not displayed.        IMPRESSION:   Reason for surgery/procedure: OA hand joints  Diagnosis/reason for consult: preop    The proposed surgical procedure is considered LOW risk.    REVISED CARDIAC RISK INDEX  The patient has the following serious cardiovascular risks for perioperative complications such as (MI, PE, VFib and 3  AV Block):  No serious cardiac risks  INTERPRETATION: 0 risks:  Class I (very low risk - 0.4% complication rate)    The patient has the following additional risks for perioperative complications:  No identified additional risks      ICD-10-CM    1. Preop general physical exam Z01.818 EKG 12-lead complete w/read - Clinics   2. Osteoarthritis of finger, unspecified laterality M19.049        RECOMMENDATIONS:         --Patient is to take all scheduled medications on the day of surgery EXCEPT for modifications listed below.  She will take thyroid, can take bupropron in am or wait.    Recommend albuterol neb treatment as needed intra- or postoperatively for hypoxia or wheezing.     APPROVAL GIVEN to proceed with proposed procedure, without further diagnostic evaluation       Signed Electronically by: Jessica Smith MD    Copy of this evaluation report is provided to requesting physician.    [place] Preop Guidelines

## 2018-03-09 NOTE — MR AVS SNAPSHOT
After Visit Summary   3/9/2018    Su Tinoco    MRN: [id number]           Patient Information     Date Of Birth          1955        Visit Information        Provider Department      3/9/2018 9:40 AM Jessica Smith MD [facility]        Today's Diagnoses     Preop general physical exam    -  1      Care Instructions      Before Your Surgery      Call your surgeon if there is any change in your health. This includes signs of a cold or flu (such as a sore throat, runny nose, cough, rash or fever).    Do not smoke, drink alcohol or take over the counter medicine (unless your surgeon or primary care doctor tells you to) for the 24 hours before and after surgery.    If you take prescribed drugs: Follow your doctor s orders about which medicines to take and which to stop until after surgery.    Eating and drinking prior to surgery: follow the instructions from your surgeon    Take a shower or bath the night before surgery. Use the soap your surgeon gave you to gently clean your skin. If you do not have soap from your surgeon, use your regular soap. Do not shave or scrub the surgery site.  Wear clean pajamas and have clean sheets on your bed.           Follow-ups after your visit        Your next 10 appointments already scheduled     Apr 03, 2018  8:40 AM CDT   [facility] with [facility] Smith [facility]   [facility]    [address] [place] [address] [phone]              Who to contact     If you have questions or need follow up information about today's clinic visit or your schedule please contact [facility] directly at [phone].  Normal or non-critical lab and imaging results will be communicated to you by MyChart, letter or phone within 4 business days after the clinic has received the results. If you do not hear from us within 7 days, please contact the clinic through MyChart or phone. If you have a  critical or abnormal lab result, we will notify you by phone as soon as possible.  Submit refill requests through [email or web address] or call your pharmacy and they will forward the refill request to us. Please allow 3 business days for your refill to be completed.          Additional Information About Your Visit        Cardiac Conceptshart Information     [email or web address] gives you secure access to your electronic health record. If you see a primary care provider, you can also send messages to your care team and make appointments. If you have questions, please call your primary care clinic.  If you do not have a primary care provider, please call [phone] and they will assist you.        Care EveryWhere ID     This is your Care EveryWhere ID. This could be used by other organizations to access your [place] medical records  [id number]        Your Vitals Were     Pulse Temperature Respirations Last Period Pulse Oximetry BMI (Body Mass Index)    76 98.3  F (36.8  C) (Oral) 16 12/01/2007 98% 25.97 kg/m2       Blood Pressure from Last 3 Encounters:   03/09/18 118/76   09/14/17 92/66   08/01/17 120/70    Weight from Last 3 Encounters:   03/09/18 142 lb (64.4 kg)   09/14/17 136 lb (61.7 kg)   03/20/17 135 lb (61.2 kg)              We Performed the Following     EKG 12-lead complete w/read - Clinics          Today's Medication Changes          These changes are accurate as of 3/9/18 10:40 AM.  If you have any questions, ask your nurse or doctor.               Stop taking these medicines if you haven't already. Please contact your care team if you have questions.     saccharomyces boulardii 250 MG capsule   Commonly known as:  FLORASTOR   Stopped by:  Jessica Smith MD                    Primary Care Provider Office Phone # Fax #    Jessica Smith [phone]       [address]  [facility] [address]        Equal Access to Services     [facility]: Dario Easley, lissette lan, jorge hernandez,  christina canorosa richardson'aan ah. So [facility] [phone].    ATENCIÓN: Si arelila taz, tiene a cole disposición servicios gratuitos de asistencia lingüística. Sagrario lee [phone].    We comply with applicable federal civil rights laws and [place] laws. We do not discriminate on the basis of race, color, national origin, age, disability, sex, sexual orientation, or gender identity.            Thank you!     Thank you for choosing [facility]  for your care. Our goal is always to provide you with excellent care. Hearing back from our patients is one way we can continue to improve our services. Please take a few minutes to complete the written survey that you may receive in the mail after your visit with us. Thank you!             Your Updated Medication List - Protect others around you: Learn how to safely use, store and throw away your medicines at www.disposemymeds.org.          This list is accurate as of 3/9/18 10:40 AM.  Always use your most recent med list.                   Brand Name Dispense Instructions for use Diagnosis    albuterol 108 (90 BASE) MCG/ACT Inhaler    PROAIR HFA    2 Inhaler    Inhale 1-2 puffs into the lungs every 6 hours as needed    Mild intermittent asthma, uncomplicated       buPROPion 300 MG 24 hr tablet    WELLBUTRIN XL    90 tablet    Take 1 tablet (300 mg) by mouth every morning    BRONWYN (generalized anxiety disorder)       EPINEPHrine 0.3 MG/0.3ML injection 2-pack    EPIPEN/ADRENACLICK/or ANY BX GENERIC EQUIV    0.6 mL    Inject 0.3 mLs (0.3 mg) into the muscle once as needed for anaphylaxis    Anaphylactic reaction due to peanuts, initial encounter       levothyroxine 88 MCG tablet    SYNTHROID/LEVOTHROID    90 tablet    Take 1 tablet (88 mcg) by mouth daily    Acquired hypothyroidism       montelukast 10 MG tablet    SINGULAIR    90 tablet    TAKE 1 TABLET BY MOUTH AT BEDTIME    Mild intermittent asthma without complication       PREMARIN cream   Generic  drug:  conjugated estrogens           PROBIOTIC DAILY PO           traMADol 50 MG tablet    ULTRAM    75 tablet    1 tablet every 6-8 hours as needed    Spondylolisthesis at L5-S1 level       traZODone 100 MG tablet    DESYREL    135 tablet    Take 1.5 tablets (150 mg) by mouth At Bedtime    Insomnia, unspecified

## 2018-03-09 NOTE — NURSING NOTE
"Chief Complaint   Patient presents with     Pre-Op Exam       Initial /76  Pulse 76  Temp 98.3  F (36.8  C) (Oral)  Resp 16  Wt 142 lb (64.4 kg)  LMP 12/01/2007  SpO2 98%  BMI 25.97 kg/m2 Estimated body mass index is 25.97 kg/(m^2) as calculated from the following:    Height as of 9/14/17: 5' 2\" (1.575 m).    Weight as of this encounter: 142 lb (64.4 kg).  Medication Reconciliation: raza Hollingsworth CMA      Discussed Health Maintenance:    Patient agreed to schedule Pap with GYN at [facility]. Order have been place and information given to patient.       "

## 2018-03-20 ENCOUNTER — TRANSFERRED RECORDS (OUTPATIENT)
Dept: HEALTH INFORMATION MANAGEMENT | Facility: CLINIC | Age: 63
End: 2018-03-20

## 2018-03-20 ENCOUNTER — TELEPHONE (OUTPATIENT)
Dept: INTERNAL MEDICINE | Facility: CLINIC | Age: 63
End: 2018-03-20

## 2018-03-20 NOTE — TELEPHONE ENCOUNTER
Pt states she got water in her Right ear on Sunday when showering and washing hair. She cannot get the water out of her ear. Tried yawning, cupping.   Now she has a hard time hearing out of that ear, very muffled. She tried everything. She tried lying on her right side. Nothing is working.     No pain. Just not comfortable.    Please advise.

## 2018-03-20 NOTE — TELEPHONE ENCOUNTER
Sometimes the problem is that wax got pushed in and that is causing the plugging. Sometimes the water is trapped behind wax. Sometimes there is an air bubble trapped, though that will usually open and drain water in a day or so.   Recommend she  ear wax kit and use the drops and then use the bulb syringe to flush after a few hours.

## 2018-03-27 ENCOUNTER — TRANSFERRED RECORDS (OUTPATIENT)
Dept: HEALTH INFORMATION MANAGEMENT | Facility: CLINIC | Age: 63
End: 2018-03-27

## 2018-04-03 ENCOUNTER — OFFICE VISIT (OUTPATIENT)
Dept: INTERNAL MEDICINE | Facility: CLINIC | Age: 63
End: 2018-04-03
Payer: COMMERCIAL

## 2018-04-03 VITALS
OXYGEN SATURATION: 97 % | RESPIRATION RATE: 16 BRPM | DIASTOLIC BLOOD PRESSURE: 78 MMHG | BODY MASS INDEX: 25.97 KG/M2 | HEART RATE: 69 BPM | WEIGHT: 142 LBS | TEMPERATURE: 98 F | SYSTOLIC BLOOD PRESSURE: 128 MMHG

## 2018-04-03 DIAGNOSIS — Z00.00 ENCOUNTER FOR ROUTINE ADULT HEALTH EXAMINATION WITHOUT ABNORMAL FINDINGS: Primary | ICD-10-CM

## 2018-04-03 DIAGNOSIS — E03.9 ACQUIRED HYPOTHYROIDISM: ICD-10-CM

## 2018-04-03 DIAGNOSIS — Z12.31 ENCOUNTER FOR SCREENING MAMMOGRAM FOR BREAST CANCER: ICD-10-CM

## 2018-04-03 LAB
ANION GAP SERPL CALCULATED.3IONS-SCNC: 3 MMOL/L (ref 3–14)
BUN SERPL-MCNC: 25 MG/DL (ref 7–30)
CALCIUM SERPL-MCNC: 9.1 MG/DL (ref 8.5–10.1)
CHLORIDE SERPL-SCNC: 103 MMOL/L (ref 94–109)
CHOLEST SERPL-MCNC: 214 MG/DL
CO2 SERPL-SCNC: 32 MMOL/L (ref 20–32)
CREAT SERPL-MCNC: 0.87 MG/DL (ref 0.52–1.04)
GFR SERPL CREATININE-BSD FRML MDRD: 66 ML/MIN/1.7M2
GLUCOSE SERPL-MCNC: 82 MG/DL (ref 70–99)
HDLC SERPL-MCNC: 105 MG/DL
LDLC SERPL CALC-MCNC: 98 MG/DL
NONHDLC SERPL-MCNC: 109 MG/DL
POTASSIUM SERPL-SCNC: 4.1 MMOL/L (ref 3.4–5.3)
SODIUM SERPL-SCNC: 138 MMOL/L (ref 133–144)
TRIGL SERPL-MCNC: 54 MG/DL
TSH SERPL DL<=0.005 MIU/L-ACNC: 1.58 MU/L (ref 0.4–4)

## 2018-04-03 PROCEDURE — 99396 PREV VISIT EST AGE 40-64: CPT | Performed by: INTERNAL MEDICINE

## 2018-04-03 PROCEDURE — 80048 BASIC METABOLIC PNL TOTAL CA: CPT | Performed by: INTERNAL MEDICINE

## 2018-04-03 PROCEDURE — 36415 COLL VENOUS BLD VENIPUNCTURE: CPT | Performed by: INTERNAL MEDICINE

## 2018-04-03 PROCEDURE — 80061 LIPID PANEL: CPT | Performed by: INTERNAL MEDICINE

## 2018-04-03 PROCEDURE — 84443 ASSAY THYROID STIM HORMONE: CPT | Performed by: INTERNAL MEDICINE

## 2018-04-03 RX ORDER — LEVOTHYROXINE SODIUM 88 UG/1
88 TABLET ORAL DAILY
Qty: 90 TABLET | Refills: 3 | Status: SHIPPED | OUTPATIENT
Start: 2018-04-03 | End: 2019-05-23

## 2018-04-03 NOTE — MR AVS SNAPSHOT
After Visit Summary   4/3/2018    Su Tinoco    MRN: [id number]           Patient Information     Date Of Birth          1955        Visit Information        Provider Department      4/3/2018 8:40 AM Jessica Smith MD [facility]        Today's Diagnoses     Encounter for screening mammogram for breast cancer    -  1    Acquired hypothyroidism          Care Instructions      Preventive Health Recommendations  Female Ages 50 - 64    Yearly exam: See your health care provider every year in order to  o Review health changes.   o Discuss preventive care.    o Review your medicines if your doctor has prescribed any.      Get a Pap test every three years (unless you have an abnormal result and your provider advises testing more often).    If you get Pap tests with HPV test, you only need to test every 5 years, unless you have an abnormal result.     You do not need a Pap test if your uterus was removed (hysterectomy) and you have not had cancer.    You should be tested each year for STDs (sexually transmitted diseases) if you're at risk.     Have a mammogram every 1 to 2 years.    Have a colonoscopy at age 50, or have a yearly FIT test (stool test). These exams screen for colon cancer.      Have a cholesterol test every 5 years, or more often if advised.    Have a diabetes test (fasting glucose) every three years. If you are at risk for diabetes, you should have this test more often.     If you are at risk for osteoporosis (brittle bone disease), think about having a bone density scan (DEXA).    Shots: Get a flu shot each year. Get a tetanus shot every 10 years.    Nutrition:     Eat at least 5 servings of fruits and vegetables each day.    Eat whole-grain bread, whole-wheat pasta and brown rice instead of white grains and rice.    Talk to your provider about Calcium and Vitamin D.     Lifestyle    Exercise at least 150 minutes a week (30 minutes a day, 5 days a week). This will help  you control your weight and prevent disease.    Limit alcohol to one drink per day.    No smoking.     Wear sunscreen to prevent skin cancer.     See your dentist every six months for an exam and cleaning.    See your eye doctor every 1 to 2 years.            Follow-ups after your visit        Who to contact     If you have questions or need follow up information about today's clinic visit or your schedule please contact [facility] directly at [phone].  Normal or non-critical lab and imaging results will be communicated to you by Content360hart, letter or phone within 4 business days after the clinic has received the results. If you do not hear from us within 7 days, please contact the clinic through Heyy or phone. If you have a critical or abnormal lab result, we will notify you by phone as soon as possible.  Submit refill requests through Heyy or call your pharmacy and they will forward the refill request to us. Please allow 3 business days for your refill to be completed.          Additional Information About Your Visit        Heyy Information     Heyy gives you secure access to your electronic health record. If you see a primary care provider, you can also send messages to your care team and make appointments. If you have questions, please call your primary care clinic.  If you do not have a primary care provider, please call [phone] and they will assist you.        Care EveryWhere ID     This is your Care EveryWhere ID. This could be used by other organizations to access your [place] medical records  [id number]        Your Vitals Were     Pulse Temperature Respirations Last Period Pulse Oximetry BMI (Body Mass Index)    69 98  F (36.7  C) (Oral) 16 12/01/2007 97% 25.97 kg/m2       Blood Pressure from Last 3 Encounters:   04/03/18 128/78   03/09/18 118/76   09/14/17 92/66    Weight from Last 3 Encounters:   04/03/18 142 lb (64.4 kg)   03/09/18 142 lb (64.4 kg)   09/14/17 136 lb  (61.7 kg)              Today, you had the following     No orders found for display       Primary Care Provider Office Phone # Fax #    Jessica Smith [phone]       [address]  [facility] [address]        Equal Access to Services     JAZMYNECHANEL RIC : Hadii aad ku hadtoneo Soomaali, waaxda luqadaha, qaybta kaalmada adeegyada, waxay idiin hayfauzian aderosa suárez janey . So [facility] [phone].    ATENCIÓN: Si habla español, tiene a cole disposición servicios gratuitos de asistencia lingüística. Llame al [phone].    We comply with applicable federal civil rights laws and [place] laws. We do not discriminate on the basis of race, color, national origin, age, disability, sex, sexual orientation, or gender identity.            Thank you!     Thank you for choosing [facility]  for your care. Our goal is always to provide you with excellent care. Hearing back from our patients is one way we can continue to improve our services. Please take a few minutes to complete the written survey that you may receive in the mail after your visit with us. Thank you!             Your Updated Medication List - Protect others around you: Learn how to safely use, store and throw away your medicines at www.disposemymeds.org.          This list is accurate as of 4/3/18  9:04 AM.  Always use your most recent med list.                   Brand Name Dispense Instructions for use Diagnosis    albuterol 108 (90 BASE) MCG/ACT Inhaler    PROAIR HFA    2 Inhaler    Inhale 1-2 puffs into the lungs every 6 hours as needed    Mild intermittent asthma, uncomplicated       buPROPion 300 MG 24 hr tablet    WELLBUTRIN XL    90 tablet    Take 1 tablet (300 mg) by mouth every morning    BRONWYN (generalized anxiety disorder)       EPINEPHrine 0.3 MG/0.3ML injection 2-pack    EPIPEN/ADRENACLICK/or ANY BX GENERIC EQUIV    0.6 mL    Inject 0.3 mLs (0.3 mg) into the muscle once as needed for anaphylaxis    Anaphylactic  reaction due to peanuts, initial encounter       levothyroxine 88 MCG tablet    SYNTHROID/LEVOTHROID    90 tablet    Take 1 tablet (88 mcg) by mouth daily    Acquired hypothyroidism       montelukast 10 MG tablet    SINGULAIR    90 tablet    TAKE 1 TABLET BY MOUTH AT BEDTIME    Mild intermittent asthma without complication       PREMARIN cream   Generic drug:  conjugated estrogens           PROBIOTIC DAILY PO           traMADol 50 MG tablet    ULTRAM    75 tablet    1 tablet every 6-8 hours as needed    Spondylolisthesis at L5-S1 level       traZODone 100 MG tablet    DESYREL    135 tablet    Take 1.5 tablets (150 mg) by mouth At Bedtime    Insomnia, unspecified

## 2018-04-03 NOTE — PROGRESS NOTES
SUBJECTIVE:   CC: Su Tinoco is an 62 year old woman who presents for preventive health visit.     Physical   Annual:     Getting at least 3 servings of Calcium per day::  Yes    Bi-annual eye exam::  Yes    Dental care twice a year::  Yes    Sleep apnea or symptoms of sleep apnea::  None    Frequency of exercise::  2-3 days/week    Duration of exercise::  45-60 minutes    Taking medications regularly::  Yes    Medication side effects::  Not applicable    Additional concerns today::  YES          Discussed Health Maintenance:  Please abstract the following data from this visit with this patient into the appropriate field in Epic:    Pap smear done on this date: 06/2016 (approximately), by this group: [facility], results were Negative.     Current concerns:   She did not realize her last physical was in September but would like to still do it today.  She reports her insurance will cover 1 per calendar year.  She still sees her OB/GYN and will be there in June.   Breast cancer concerns: sister had cancer at 37 and in the past Oncology recommended she have an MRI breast. She could not tolerate it due to claustrophobia. She would like to do open sided MRI now.     Today's PHQ-2 Score:   PHQ-2 ( 1999 [employer]) 4/3/2018   Q1: Little interest or pleasure in doing things 0   Q2: Feeling down, depressed or hopeless 0   PHQ-2 Score 0   Q1: Little interest or pleasure in doing things Not at all   Q2: Feeling down, depressed or hopeless Not at all   PHQ-2 Score 0       Abuse: Current or Past(Physical, Sexual or Emotional)- No  Do you feel safe in your environment - Yes    Social History   Substance Use Topics     Smoking status: Never Smoker     Smokeless tobacco: Never Used     Alcohol use Yes      Comment: rarely- once a month     Alcohol Use 4/3/2018   If you drink alcohol do you typically have greater than 3 drinks per day OR greater than 7 drinks per week? No   No flowsheet data found.    Reviewed orders with  patient.  Reviewed health maintenance and updated orders accordingly - Yes      Patient over age 50, mutual decision to screen reflected in health maintenance.    Pertinent mammograms are reviewed under the imaging tab.  History of abnormal Pap smear: NO - age 30-65 PAP every 5 years with negative HPV co-testing recommended    Reviewed and updated as needed this visit by clinical staff  Tobacco  Meds  Med Hx  Surg Hx  Fam Hx  Soc Hx        Reviewed and updated as needed this visit by Provider            Review of Systems       Physical Exam      Patient Active Problem List   Diagnosis     Allergic rhinitis     Hypothyroidism     Migraine     Mild intermittent asthma     CARDIOVASCULAR SCREENING; LDL GOAL LESS THAN 160     BRONWYN (generalized anxiety disorder)     Spinal stenosis of lumbar region     Spondylolisthesis at L5-S1 level     Controlled substance agreement signed     Current Outpatient Prescriptions   Medication Sig Dispense Refill     Probiotic Product (PROBIOTIC DAILY PO)        traMADol (ULTRAM) 50 MG tablet 1 tablet every 6-8 hours as needed 75 tablet 1     montelukast (SINGULAIR) 10 MG tablet TAKE 1 TABLET BY MOUTH AT BEDTIME 90 tablet 3     traZODone (DESYREL) 100 MG tablet Take 1.5 tablets (150 mg) by mouth At Bedtime 135 tablet 3     buPROPion (WELLBUTRIN XL) 300 MG 24 hr tablet Take 1 tablet (300 mg) by mouth every morning 90 tablet 3     levothyroxine (SYNTHROID/LEVOTHROID) 88 MCG tablet Take 1 tablet (88 mcg) by mouth daily 90 tablet 3     EPINEPHrine (EPIPEN) 0.3 MG/0.3ML injection Inject 0.3 mLs (0.3 mg) into the muscle once as needed for anaphylaxis 0.6 mL 11     albuterol (ALBUTEROL) 108 (90 BASE) MCG/ACT inhaler Inhale 1-2 puffs into the lungs every 6 hours as needed 2 Inhaler 11     PREMARIN vaginal cream   1      Review Of Systems  Skin: negative, will see derm tomorrow  Eyes: negative  Ears/Nose/Throat: lright ear was plugged, better  Respiratory: No shortness of breath and No dyspnea  "on exertion  Cardiovascular: negative  Gastrointestinal: negative  Genitourinary: negative  Musculoskeletal: back pain  Neurologic: negative  Psychiatric: negative  Hematologic/Lymphatic/Immunologic: negative  Endocrine: negative   Gynecologic ros reveals:no breast pain or new or enlarging lumps on self exam, no vaginal bleeding, no discharge or pelvic pain    Objective:  Patient alert, in no acute distress  /78  Pulse 69  Temp 98  F (36.7  C) (Oral)  Resp 16  Wt 142 lb (64.4 kg)  LMP 12/01/2007  SpO2 97%  BMI 25.97 kg/m2    HEENT: extraocular movements are intact, pupils equal and reactive to light and accommodation, oropharynx clear, right TM dull, left normal  NECK: Neck supple. No adenopathy. Thyroid symmetric, normal size,, Carotids without bruits.  PULMONARY: clear to auscultation  CARDIAC: regular rate and rhythm and no murmurs, clicks, or gallops  PULSES: 2/2 throughout  BACK: no spinal or CVAT  ABDOMINAL: Soft, nontender.  Normal bowel sounds.  No hepatosplenomegaly or abnormal masses  BREAST: No breast masses or tenderness, No axillary masses or tenderness and No galactorrhea  PELVIC: deferred to gyn  REFLEXES: 1+ throughout  SKIN: unremarkable       ASSESSMENT/PLAN:   1. Encounter for routine adult health examination without abnormal findings      2. Acquired hypothyroidism  recheck  - levothyroxine (SYNTHROID/LEVOTHROID) 88 MCG tablet; Take 1 tablet (88 mcg) by mouth daily  Dispense: 90 tablet; Refill: 3  - TSH with free T4 reflex    3. Encounter for screening mammogram for breast cancer  Refer for MRI    - MR Breast Bilateral w Contrast; Future    COUNSELING:  Reviewed preventive health counseling, as reflected in patient instructions         reports that she has never smoked. She has never used smokeless tobacco.    Estimated body mass index is 25.97 kg/(m^2) as calculated from the following:    Height as of 9/14/17: 5' 2\" (1.575 m).    Weight as of this encounter: 142 lb (64.4 kg). "       Counseling Resources:  ATP IV Guidelines  Pooled Cohorts Equation Calculator  Breast Cancer Risk Calculator  FRAX Risk Assessment  ICSI Preventive Guidelines  Dietary Guidelines for Americans, 2010  [employer]'s MyPlate  ASA Prophylaxis  Lung CA Screening    Jessica Smith MD  [facility]  Answers for HPI/ROS submitted by the patient on 4/3/2018   PHQ-2 Score: 0

## 2018-04-03 NOTE — NURSING NOTE
"Chief Complaint   Patient presents with     Physical     Fasting       Initial /78  Pulse 69  Temp 98  F (36.7  C) (Oral)  Resp 16  Wt 142 lb (64.4 kg)  LMP 12/01/2007  SpO2 97%  BMI 25.97 kg/m2 Estimated body mass index is 25.97 kg/(m^2) as calculated from the following:    Height as of 9/14/17: 5' 2\" (1.575 m).    Weight as of this encounter: 142 lb (64.4 kg).  Medication Reconciliation: raza Hollingsworth CMA      Discussed Health Maintenance:  Please abstract the following data from this visit with this patient into the appropriate field in Epic:    Pap smear done on this date: 06/2016 (approximately), by this group: [facility], results were Negative.             "

## 2018-04-04 ASSESSMENT — ASTHMA QUESTIONNAIRES: ACT_TOTALSCORE: 25

## 2018-04-23 ENCOUNTER — TELEPHONE (OUTPATIENT)
Dept: INTERNAL MEDICINE | Facility: CLINIC | Age: 63
End: 2018-04-23

## 2018-04-23 ENCOUNTER — ALLIED HEALTH/NURSE VISIT (OUTPATIENT)
Dept: NURSING | Facility: CLINIC | Age: 63
End: 2018-04-23
Payer: COMMERCIAL

## 2018-04-23 ENCOUNTER — RADIANT APPOINTMENT (OUTPATIENT)
Dept: BONE DENSITY | Facility: CLINIC | Age: 63
End: 2018-04-23
Attending: INTERNAL MEDICINE
Payer: COMMERCIAL

## 2018-04-23 VITALS — DIASTOLIC BLOOD PRESSURE: 80 MMHG | SYSTOLIC BLOOD PRESSURE: 142 MMHG

## 2018-04-23 DIAGNOSIS — Z01.30 BP CHECK: Primary | ICD-10-CM

## 2018-04-23 DIAGNOSIS — Z00.00 ENCOUNTER FOR ROUTINE ADULT HEALTH EXAMINATION WITHOUT ABNORMAL FINDINGS: ICD-10-CM

## 2018-04-23 PROCEDURE — 99207 ZZC NO CHARGE NURSE ONLY: CPT

## 2018-04-23 PROCEDURE — 77080 DXA BONE DENSITY AXIAL: CPT | Performed by: INTERNAL MEDICINE

## 2018-04-23 NOTE — TELEPHONE ENCOUNTER
Pt calls. Report high BP today. She has been under more stress recently with work, she also had to make a quick trip to [place] this weekend. Today she had a headache and checked her BP at home. BP was 160/89, she rechecked it and it was 158/94. She then went to [facility] and checked it there - initial BP was 176/89, she tried to calm herself and recheck was 158/94. Pt has had about 3 cups of coffee today and not much water, she is drinking more water now. She feels like her heart is pounding and beating faster, per pt recent EKG was normal. Denies chest pain, shortness of breath, difficulty breathing or vision changes.    Recommended appt for follow up. Pt states she is coming in for a bone density scan at 3:30 today, asks if she can have BP checked at that time. Nurse only appt scheduled for BP check at 3pm.

## 2018-04-24 NOTE — TELEPHONE ENCOUNTER
This was sent to me without documentation in notes, just a routing message about her BP reading 142/80. Advise her that this level is better. Goal is <140/90. Likely her elevation was because of her pain and anxiety about the readings. It was not high enough to cause headache. I would not recommend any changes, just may want to check it again in a month.

## 2018-05-02 ENCOUNTER — TRANSFERRED RECORDS (OUTPATIENT)
Dept: HEALTH INFORMATION MANAGEMENT | Facility: CLINIC | Age: 63
End: 2018-05-02

## 2018-05-17 ENCOUNTER — TELEPHONE (OUTPATIENT)
Dept: INTERNAL MEDICINE | Facility: CLINIC | Age: 63
End: 2018-05-17

## 2018-05-17 NOTE — TELEPHONE ENCOUNTER
Patient calling requesting MRI results. Please call her ASAP at [phone]. This is the patients cell phone

## 2018-05-18 NOTE — TELEPHONE ENCOUNTER
Call received from patient checking on below. States she was told by Amee that she would call [facility] to request the report be faxed to us and have a covering provider review it. This is not what is documented below. Spoke to Amee. States as noted below, she sent encounter to Dr. Oconnell to review. Advised patient writer will call [facility] to request report be faxed to us. Advised unsure if another provider will be able to review it today but if not will send message to PCP for Monday requesting she review and advise. Call to [facility]. Requested report be faxed to [facility] and was received. MRI normal. Spoke to Dr. Angulo and verified this. Call to Patient. Updated. Report sent to abstraction.

## 2018-05-18 NOTE — TELEPHONE ENCOUNTER
Pt calls, she had Breast MRI at [facility] on 5/2/18, ordered by Dr. Smith. She was told the report has been sent to our office twice, they sent it to [phone]. She is leaving for [place] on 5/24/18 and would like to know results and recommendations before she leaves.

## 2018-06-09 ENCOUNTER — HEALTH MAINTENANCE LETTER (OUTPATIENT)
Age: 63
End: 2018-06-09

## 2018-06-28 ENCOUNTER — TRANSFERRED RECORDS (OUTPATIENT)
Dept: HEALTH INFORMATION MANAGEMENT | Facility: CLINIC | Age: 63
End: 2018-06-28

## 2018-07-14 ENCOUNTER — HEALTH MAINTENANCE LETTER (OUTPATIENT)
Age: 63
End: 2018-07-14

## 2018-09-04 DIAGNOSIS — F41.1 GAD (GENERALIZED ANXIETY DISORDER): ICD-10-CM

## 2018-09-05 NOTE — TELEPHONE ENCOUNTER
"Requested Prescriptions   Pending Prescriptions Disp Refills     buPROPion (WELLBUTRIN XL) 300 MG 24 hr tablet [Pharmacy Med Name: BUPROPN HCL  TAB 300MG XL]  2    Last Written Prescription Date:  07/07/2017  Last Fill Quantity: 90,  # refills: 3   Last office visit: 4/3/2018 with prescribing provider:     Future Office Visit:   Sig: TAKE 1 TABLET BY MOUTH EVERY MORNING    SSRIs Protocol Passed    9/4/2018  9:43 AM       Passed - Recent (12 mo) or future (30 days) visit within the authorizing provider's specialty    Patient had office visit in the last 12 months or has a visit in the next 30 days with authorizing provider or within the authorizing provider's specialty.  See \"Patient Info\" tab in inbasket, or \"Choose Columns\" in Meds & Orders section of the refill encounter.           Passed - Medication is Bupropion    If the medication is Bupropion (Wellbutrin), and the patient is taking for smoking cessation; OK to refill.         Passed - Patient is age 18 or older       Passed - No active pregnancy on record       Passed - No positive pregnancy test in last 12 months        "

## 2018-09-06 RX ORDER — BUPROPION HYDROCHLORIDE 300 MG/1
TABLET ORAL
Qty: 90 TABLET | Refills: 1 | Status: SHIPPED | OUTPATIENT
Start: 2018-09-06 | End: 2018-11-12

## 2018-10-01 ENCOUNTER — TRANSFERRED RECORDS (OUTPATIENT)
Dept: HEALTH INFORMATION MANAGEMENT | Facility: CLINIC | Age: 63
End: 2018-10-01

## 2018-10-01 LAB — PAP SMEAR - HIM PATIENT REPORTED: NEGATIVE

## 2018-10-30 ENCOUNTER — MYC MEDICAL ADVICE (OUTPATIENT)
Dept: INTERNAL MEDICINE | Facility: CLINIC | Age: 63
End: 2018-10-30

## 2018-10-30 DIAGNOSIS — G47.00 INSOMNIA, UNSPECIFIED TYPE: Primary | ICD-10-CM

## 2018-10-30 RX ORDER — TRAZODONE HYDROCHLORIDE 100 MG/1
100 TABLET ORAL AT BEDTIME
Qty: 90 TABLET | Refills: 1 | Status: SHIPPED | OUTPATIENT
Start: 2018-10-30 | End: 2019-03-19

## 2018-10-30 NOTE — TELEPHONE ENCOUNTER
Trazodone refill request, pt sent my chart message.   She is only taking 100 mg at bedtime.     Last refill on 17, this is .     Last OV 4/3/18    Provider approval needed.

## 2018-11-01 ENCOUNTER — NURSE TRIAGE (OUTPATIENT)
Dept: NURSING | Facility: CLINIC | Age: 63
End: 2018-11-01

## 2018-11-01 ENCOUNTER — OFFICE VISIT (OUTPATIENT)
Dept: FAMILY MEDICINE | Facility: CLINIC | Age: 63
End: 2018-11-01
Payer: COMMERCIAL

## 2018-11-01 VITALS
WEIGHT: 145 LBS | DIASTOLIC BLOOD PRESSURE: 76 MMHG | OXYGEN SATURATION: 100 % | HEIGHT: 62 IN | TEMPERATURE: 97.5 F | BODY MASS INDEX: 26.68 KG/M2 | SYSTOLIC BLOOD PRESSURE: 125 MMHG | HEART RATE: 66 BPM

## 2018-11-01 DIAGNOSIS — R19.7 DIARRHEA, UNSPECIFIED TYPE: Primary | ICD-10-CM

## 2018-11-01 PROCEDURE — 87506 IADNA-DNA/RNA PROBE TQ 6-11: CPT | Performed by: PHYSICIAN ASSISTANT

## 2018-11-01 PROCEDURE — 99213 OFFICE O/P EST LOW 20 MIN: CPT | Performed by: PHYSICIAN ASSISTANT

## 2018-11-01 NOTE — TELEPHONE ENCOUNTER
Su has severe diarrhea. It started 10/29/2018. In the last 24 hrs she's had 10 diarrhea stools.  She woke at 4 a.m. today and was laying in liquid stool. She knows to drink plenty of fluids. She stated she's going to go on the brat diet now.  Because of the number of stools she's had and her age, the protocol instructed she be seen within four hours. She's going to try to make an appointment. If unable she'll go to [facility] within four hours.    Reason for Disposition    [1] SEVERE diarrhea (e.g., 7 or more times / day more than normal) AND [2]  age > 60 years    Additional Information    Negative: Shock suspected (e.g., cold/pale/clammy skin, too weak to stand, low BP, rapid pulse)    Negative: Difficult to awaken or acting confused  (e.g., disoriented, slurred speech)    Negative: Sounds like a life-threatening emergency to the triager    Negative: [1] SEVERE abdominal pain (e.g., excruciating) AND [2] present > 1 hour    Negative: [1] SEVERE abdominal pain AND [2] age > 60    Negative: [1] Blood in the stool AND [2] moderate or large amount of blood    Negative: Black or tarry bowel movements  (Exception: chronic-unchanged  black-grey bowel movements AND is taking iron pills or Pepto-bismol)    Negative: [1] Drinking very little AND [2] dehydration suspected (e.g., no urine > 12 hours, very dry mouth, very lightheaded)    Negative: Patient sounds very sick or weak to the triager    Protocols used: DIARRHEA-ADULT-AIDEN KENT RN [facility]

## 2018-11-01 NOTE — PATIENT INSTRUCTIONS
Treating Diarrhea    Diarrhea happens when you have loose, watery, or frequent bowel movements. It is a common problem with many causes. Most cases of diarrhea clear up on their own. But certain cases may need treatment. Be sure to see your healthcare provider if your symptoms do not improve within a few days.  Getting relief  Treatment of diarrhea depends on its cause. Diarrhea caused by bacterial or parasite infection is often treated with antibiotics. Diarrhea caused by other factors, such as a stomach virus, often improves with simple home treatment. The tips below may also help relieve your symptoms.    Drink plenty of fluids. This helps prevent too much fluid loss (dehydration). Water, clear soups, and electrolyte solutions are good choices. Avoid alcohol, coffee, tea, and milk. These can irritate your intestines and make symptoms worse.    Suck on ice chips if drinking makes you queasy.    Return to your normal diet slowly. You may want to eat bland foods at first, such as rice and toast. Also, you may need to avoid certain foods for a while, such as dairy products. These can make symptoms worse. Ask your healthcare provider if there are any other foods you should avoid.    If you were prescribed antibiotics, take them as directed.    Do not take anti-diarrhea medicines without asking your healthcare provider first.  Call your healthcare provider   Call your healthcare provider if you have any of the following:     A fever of 100.4 F (38.0 C) or higher, or as directed by your healthcare provider    Severe pain    Worsening diarrhea or diarrhea for more than 2 days    Bloody vomit or stool    Signs of dehydration (dizziness, dry mouth and tongue, rapid pulse, dark urine)  Date Last Reviewed: 7/1/2016 2000-2017 [employer]. [address] [place] [address], [place] [address]. All rights reserved. This information is not intended as a substitute for professional medical care. Always follow your  healthcare professional's instructions.

## 2018-11-01 NOTE — MR AVS SNAPSHOT
After Visit Summary   11/1/2018    Su Tinoco    MRN: [id number]           Patient Information     Date Of Birth          1955        Visit Information        Provider Department      11/1/2018 9:40 AM Nolan Atkins PA-C [facility]        Today's Diagnoses     Diarrhea, unspecified type    -  1      Care Instructions      Treating Diarrhea    Diarrhea happens when you have loose, watery, or frequent bowel movements. It is a common problem with many causes. Most cases of diarrhea clear up on their own. But certain cases may need treatment. Be sure to see your healthcare provider if your symptoms do not improve within a few days.  Getting relief  Treatment of diarrhea depends on its cause. Diarrhea caused by bacterial or parasite infection is often treated with antibiotics. Diarrhea caused by other factors, such as a stomach virus, often improves with simple home treatment. The tips below may also help relieve your symptoms.    Drink plenty of fluids. This helps prevent too much fluid loss (dehydration). Water, clear soups, and electrolyte solutions are good choices. Avoid alcohol, coffee, tea, and milk. These can irritate your intestines and make symptoms worse.    Suck on ice chips if drinking makes you queasy.    Return to your normal diet slowly. You may want to eat bland foods at first, such as rice and toast. Also, you may need to avoid certain foods for a while, such as dairy products. These can make symptoms worse. Ask your healthcare provider if there are any other foods you should avoid.    If you were prescribed antibiotics, take them as directed.    Do not take anti-diarrhea medicines without asking your healthcare provider first.  Call your healthcare provider   Call your healthcare provider if you have any of the following:     A fever of 100.4 F (38.0 C) or higher, or as directed by your healthcare provider    Severe pain    Worsening diarrhea or diarrhea for  more than 2 days    Bloody vomit or stool    Signs of dehydration (dizziness, dry mouth and tongue, rapid pulse, dark urine)  Date Last Reviewed: 7/1/2016 2000-2017 [employer]. [address] [place] [address], [place] [address]. All rights reserved. This information is not intended as a substitute for professional medical care. Always follow your healthcare professional's instructions.                Follow-ups after your visit        Future tests that were ordered for you today     Open Future Orders        Priority Expected Expires Ordered    Cryptosporidium in stool stain Routine  11/1/2019 11/1/2018    Clostridium difficile toxin B PCR Routine  1/30/2019 11/1/2018    Enteric Bacteria and Virus Panel by KARSON Stool Routine  11/1/2019 11/1/2018    Fecal leukocyte Routine  1/30/2019 11/1/2018    Giardia antigen Routine  11/1/2019 11/1/2018    Ova and Parasite Exam Routine Routine  11/1/2019 11/1/2018            Who to contact     If you have questions or need follow up information about today's clinic visit or your schedule please contact [facility] directly at [phone].  Normal or non-critical lab and imaging results will be communicated to you by Audience[employer], letter or phone within 4 business days after the clinic has received the results. If you do not hear from us within 7 days, please contact the clinic through Audience[employer] or phone. If you have a critical or abnormal lab result, we will notify you by phone as soon as possible.  Submit refill requests through [employer]. or call your pharmacy and they will forward the refill request to us. Please allow 3 business days for your refill to be completed.          Additional Information About Your Visit        Audiencehart Information     [employer] gives you secure access to your electronic health record. If you see a primary care provider, you can also send messages to your care team and make appointments. If you have questions, please call your primary  "care clinic.  If you do not have a primary care provider, please call [phone] and they will assist you.        Care EveryWhere ID     This is your Care EveryWhere ID. This could be used by other organizations to access your [facility] medical records  [id number]        Your Vitals Were     Pulse Temperature Height Last Period Pulse Oximetry BMI (Body Mass Index)    66 97.5  F (36.4  C) 5' 2\" (1.575 m) 12/01/2007 100% 26.52 kg/m2       Blood Pressure from Last 3 Encounters:   11/01/18 125/76   04/23/18 142/80   04/03/18 128/78    Weight from Last 3 Encounters:   11/01/18 145 lb (65.8 kg)   04/03/18 142 lb (64.4 kg)   03/09/18 142 lb (64.4 kg)               Primary Care Provider Office Phone # Fax #    Jessica Smith [phone]       [address] [facility] [address]  [facility] [address]        Equal Access to Services     [facility] : Hadii aad ku hadasho Soomaali, waaxda luqadaha, qaybta kaalmada adeegyada, christina toth . So [facility] [phone].    ATENCIÓN: Si habla español, tiene a cole disposición servicios gratuitos de asistencia lingüística. Llame al [phone].    We comply with applicable federal civil rights laws and [place] laws. We do not discriminate on the basis of race, color, national origin, age, disability, sex, sexual orientation, or gender identity.            Thank you!     Thank you for choosing [facility]  for your care. Our goal is always to provide you with excellent care. Hearing back from our patients is one way we can continue to improve our services. Please take a few minutes to complete the written survey that you may receive in the mail after your visit with us. Thank you!             Your Updated Medication List - Protect others around you: Learn how to safely use, store and throw away your medicines at www.disposemymeds.org.          This list is accurate as of 11/1/18 10:05 AM.  Always use your most recent med list.          "          Brand Name Dispense Instructions for use Diagnosis    albuterol 108 (90 Base) MCG/ACT inhaler    PROAIR HFA    2 Inhaler    Inhale 1-2 puffs into the lungs every 6 hours as needed    Mild intermittent asthma, uncomplicated       buPROPion 300 MG 24 hr tablet    WELLBUTRIN XL    90 tablet    TAKE 1 TABLET BY MOUTH EVERY MORNING    BRONWYN (generalized anxiety disorder)       EPINEPHrine 0.3 MG/0.3ML injection 2-pack    EPIPEN/ADRENACLICK/or ANY BX GENERIC EQUIV    0.6 mL    Inject 0.3 mLs (0.3 mg) into the muscle once as needed for anaphylaxis    Anaphylactic reaction due to peanuts, initial encounter       levothyroxine 88 MCG tablet    SYNTHROID/LEVOTHROID    90 tablet    Take 1 tablet (88 mcg) by mouth daily    Acquired hypothyroidism       montelukast 10 MG tablet    SINGULAIR    90 tablet    TAKE 1 TABLET BY MOUTH AT BEDTIME    Mild intermittent asthma without complication       PREMARIN cream   Generic drug:  conjugated estrogens           PROBIOTIC DAILY PO           traMADol 50 MG tablet    ULTRAM    75 tablet    1 tablet every 6-8 hours as needed    Spondylolisthesis at L5-S1 level       traZODone 100 MG tablet    DESYREL    90 tablet    Take 1 tablet (100 mg) by mouth At Bedtime    Insomnia, unspecified type

## 2018-11-01 NOTE — PROGRESS NOTES
SUBJECTIVE:   Su Tinoco is a 63 year old female who presents to clinic today for the following health issues:      Diarrhea  Onset: 4 days     Description:   Consistency of stool: watery   Blood in stool: no   Number of loose stools in past 24 hours: 15    Progression of Symptoms:  worsening    Accompanying Signs & Symptoms:  Fever: no   Nausea or vomiting; no   Abdominal pain: no   Episodes of constipation: no  Was taking fiber   Weight loss: no     History:   Ill contacts: yes    Recent use of antibiotics: no    Recent travels: no          Recent medication-new or changes(Rx or OTC): no     Precipitating factors:   Rich foods    Alleviating factors:   none    Therapies Tried and outcome:        Problem list and histories reviewed & adjusted, as indicated.  Additional history: as documented    Patient Active Problem List   Diagnosis     Allergic rhinitis     Hypothyroidism     Migraine     Mild intermittent asthma     CARDIOVASCULAR SCREENING; LDL GOAL LESS THAN 160     BRONWYN (generalized anxiety disorder)     Spinal stenosis of lumbar region     Spondylolisthesis at L5-S1 level     Controlled substance agreement signed     Past Surgical History:   Procedure Laterality Date     C NONSPECIFIC PROCEDURE      Laparoscopies IUD infection     C NONSPECIFIC PROCEDURE      removal of myxoid tumors fingers     C NONSPECIFIC PROCEDURE      sphincterotomy of anus     C/SECTION, LOW TRANSVERSE      , Low Transverse     TONSILLECTOMY & ADENOIDECTOMY       TUBAL LIGATION         Social History   Substance Use Topics     Smoking status: Never Smoker     Smokeless tobacco: Never Used     Alcohol use Yes      Comment: rarely- once a month     Family History   Problem Relation Age of Onset     Eye Disorder Mother      iritis and glaucoma     Thyroid Disease Mother      Diabetes Father      Hypertension Father      Cancer Father      Melanoma     Cardiovascular Father      CHF     Diabetes Sister      type 2      "Diabetes Sister 50     type 2 diabetes     Breast Cancer Sister      38     Blood Disease Daughter      Beta Thallesemia Minor     HEART DISEASE Maternal Grandmother      cardiomegaly         Current Outpatient Prescriptions   Medication Sig Dispense Refill     albuterol (ALBUTEROL) 108 (90 BASE) MCG/ACT inhaler Inhale 1-2 puffs into the lungs every 6 hours as needed 2 Inhaler 11     buPROPion (WELLBUTRIN XL) 300 MG 24 hr tablet TAKE 1 TABLET BY MOUTH EVERY MORNING 90 tablet 1     EPINEPHrine (EPIPEN) 0.3 MG/0.3ML injection Inject 0.3 mLs (0.3 mg) into the muscle once as needed for anaphylaxis 0.6 mL 11     levothyroxine (SYNTHROID/LEVOTHROID) 88 MCG tablet Take 1 tablet (88 mcg) by mouth daily 90 tablet 3     montelukast (SINGULAIR) 10 MG tablet TAKE 1 TABLET BY MOUTH AT BEDTIME 90 tablet 3     PREMARIN vaginal cream   1     Probiotic Product (PROBIOTIC DAILY PO)        traMADol (ULTRAM) 50 MG tablet 1 tablet every 6-8 hours as needed 75 tablet 1     traZODone (DESYREL) 100 MG tablet Take 1 tablet (100 mg) by mouth At Bedtime 90 tablet 1     Allergies   Allergen Reactions     Nuts Anaphylaxis     peanuts, nara nuts     Compazine      Lock jaw, vision problems and shakes     Erythromycin GI Disturbance     Prochlorperazine      Jaw spasm and vision loss       Reviewed and updated as needed this visit by clinical staff       Reviewed and updated as needed this visit by Provider         ROS:  Constitutional, HEENT, cardiovascular, pulmonary, gi and gu systems are negative, except as otherwise noted.    OBJECTIVE:     /76 (BP Location: Right arm, Patient Position: Chair, Cuff Size: Adult Regular)  Pulse 66  Temp 97.5  F (36.4  C)  Ht 5' 2\" (1.575 m)  Wt 145 lb (65.8 kg)  LMP 12/01/2007  SpO2 100%  BMI 26.52 kg/m2  Body mass index is 26.52 kg/(m^2).  GENERAL: healthy, alert and no distress  EYES: Eyes grossly normal to inspection, PERRL and conjunctivae and sclerae normal  RESP: lungs clear to auscultation " - no rales, rhonchi or wheezes  CV: regular rate and rhythm, normal S1 S2, no S3 or S4, no murmur, click or rub, no peripheral edema and peripheral pulses strong  ABDOMEN: soft, nontender, no hepatosplenomegaly, no masses and bowel sounds normal  MS: no gross musculoskeletal defects noted, no edema  SKIN: no suspicious lesions or rashes  NEURO: Normal strength and tone, mentation intact and speech normal  PSYCH: mentation appears normal, affect normal/bright    Diagnostic Test Results:  none     ASSESSMENT/PLAN:     (R19.7) Diarrhea, unspecified type  (primary encounter diagnosis)    Comment: Unknown cause. 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 Test for possible causes. OK to try imodium after you leave stool samples. Stick with BRAT diet and drink lots of fluids.    Plan: Cryptosporidium in stool stain, Clostridium         difficile toxin B PCR, Enteric Bacteria and         Virus Panel by KARSON Stool, Fecal leukocyte,         Giardia antigen, Ova and Parasite Exam Routine              Patient Instructions     Treating Diarrhea    Diarrhea happens when you have loose, watery, or frequent bowel movements. It is a common problem with many causes. Most cases of diarrhea clear up on their own. But certain cases may need treatment. Be sure to see your healthcare provider if your symptoms do not improve within a few days.  Getting relief  Treatment of diarrhea depends on its cause. Diarrhea caused by bacterial or parasite infection is often treated with antibiotics. Diarrhea caused by other factors, such as a stomach virus, often improves with simple home treatment. The tips below may also help relieve your symptoms.    Drink plenty of fluids. This helps prevent too much fluid loss (dehydration). Water, clear soups, and electrolyte solutions are good choices. Avoid alcohol, coffee, tea, and milk. These can irritate your intestines and make symptoms worse.    Suck on ice chips if drinking makes you  queasy.    Return to your normal diet slowly. You may want to eat bland foods at first, such as rice and toast. Also, you may need to avoid certain foods for a while, such as dairy products. These can make symptoms worse. Ask your healthcare provider if there are any other foods you should avoid.    If you were prescribed antibiotics, take them as directed.    Do not take anti-diarrhea medicines without asking your healthcare provider first.  Call your healthcare provider   Call your healthcare provider if you have any of the following:     A fever of 100.4 F (38.0 C) or higher, or as directed by your healthcare provider    Severe pain    Worsening diarrhea or diarrhea for more than 2 days    Bloody vomit or stool    Signs of dehydration (dizziness, dry mouth and tongue, rapid pulse, dark urine)  Date Last Reviewed: 7/1/2016 2000-2017 [employer]. [address] [place] [address]. All rights reserved. This information is not intended as a substitute for professional medical care. Always follow your healthcare professional's instructions.            Nolan Atkins PA-C  [facility]

## 2018-11-02 ENCOUNTER — TELEPHONE (OUTPATIENT)
Dept: INTERNAL MEDICINE | Facility: CLINIC | Age: 63
End: 2018-11-02

## 2018-11-02 DIAGNOSIS — R19.7 DIARRHEA, UNSPECIFIED TYPE: ICD-10-CM

## 2018-11-02 LAB
C COLI+JEJUNI+LARI FUSA STL QL NAA+PROBE: NOT DETECTED
C DIFF TOX B STL QL: NEGATIVE
EC STX1 GENE STL QL NAA+PROBE: NOT DETECTED
EC STX2 GENE STL QL NAA+PROBE: NOT DETECTED
ENTERIC PATHOGEN COMMENT: NORMAL
NOROV GI+II ORF1-ORF2 JNC STL QL NAA+PR: NOT DETECTED
RVA NSP5 STL QL NAA+PROBE: NOT DETECTED
SALMONELLA SP RPOD STL QL NAA+PROBE: NOT DETECTED
SHIGELLA SP+EIEC IPAH STL QL NAA+PROBE: NOT DETECTED
SPECIMEN SOURCE: NORMAL
SPECIMEN SOURCE: NORMAL
V CHOL+PARA RFBL+TRKH+TNAA STL QL NAA+PR: NOT DETECTED
WBC STL QL MICRO: NORMAL
WBC STL QL MICRO: NORMAL
Y ENTERO RECN STL QL NAA+PROBE: NOT DETECTED

## 2018-11-02 PROCEDURE — 89055 LEUKOCYTE ASSESSMENT FECAL: CPT | Performed by: PHYSICIAN ASSISTANT

## 2018-11-02 PROCEDURE — 87209 SMEAR COMPLEX STAIN: CPT | Performed by: PHYSICIAN ASSISTANT

## 2018-11-02 PROCEDURE — 87329 GIARDIA AG IA: CPT | Performed by: PHYSICIAN ASSISTANT

## 2018-11-02 PROCEDURE — 87206 SMEAR FLUORESCENT/ACID STAI: CPT | Performed by: PHYSICIAN ASSISTANT

## 2018-11-02 PROCEDURE — 87177 OVA AND PARASITES SMEARS: CPT | Performed by: PHYSICIAN ASSISTANT

## 2018-11-02 PROCEDURE — 87493 C DIFF AMPLIFIED PROBE: CPT | Performed by: PHYSICIAN ASSISTANT

## 2018-11-02 NOTE — TELEPHONE ENCOUNTER
Patient calls, she saw LAKIA Hillman at [facility] yesterday for diarrhea. Patient brought in stool samples today for testing, but has already had 5 loose stools today after eating some toast and applesauce this morning. She was told she could take Imodium after she provided the samples, but is hesitant to do this. She is following the BRAT diet and drinking a lot of fluids to stay hydrated. Patient asks for any further recommendations for the weekend. Recommended she try the Imodium since Dr. Atkins said this was okay, continue BRAT diet and lots of fluids-recommended Gatorade for electrolytes. Advised patient to go to the ER if she has any signs of dehydration. Patient verbalizes understanding.

## 2018-11-04 ENCOUNTER — MYC MEDICAL ADVICE (OUTPATIENT)
Dept: INTERNAL MEDICINE | Facility: CLINIC | Age: 63
End: 2018-11-04

## 2018-11-05 LAB
G LAMBLIA AG STL QL IA: NORMAL
O+P STL MICRO: NORMAL
O+P STL MICRO: NORMAL
SPECIMEN SOURCE: NORMAL
SPECIMEN SOURCE: NORMAL

## 2018-11-05 NOTE — TELEPHONE ENCOUNTER
Pt calls stating she had 7 watery diarrhea stools this AM already.     Taking Imodium, 2 a day.     Now incontinent in bed last night.     Doing BRAT diet, rice and chicken.     A lot of gargling in stomach.     Saw PA and did have stool tests done. 3 tests still pending.     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     Started last Monday. One day, had 15 stools.     Drinking a lot.     See my chart message. She forgot her Password. Need to call her.

## 2018-11-05 NOTE — TELEPHONE ENCOUNTER
Please advise she should boost her Imodium, may need to take 2/4 times a day, suggest she also add Pepto-Bismol which can soothe the colon.  Suggest going on to a clear liquid diet instead of Brat diet for now.

## 2018-11-06 LAB
O+P STL CONC: NORMAL
SPECIMEN SOURCE: NORMAL

## 2018-11-12 ENCOUNTER — OFFICE VISIT (OUTPATIENT)
Dept: INTERNAL MEDICINE | Facility: CLINIC | Age: 63
End: 2018-11-12
Payer: COMMERCIAL

## 2018-11-12 VITALS
BODY MASS INDEX: 26.68 KG/M2 | HEART RATE: 77 BPM | OXYGEN SATURATION: 99 % | TEMPERATURE: 98.1 F | HEIGHT: 62 IN | RESPIRATION RATE: 20 BRPM | DIASTOLIC BLOOD PRESSURE: 70 MMHG | WEIGHT: 145 LBS | SYSTOLIC BLOOD PRESSURE: 114 MMHG

## 2018-11-12 DIAGNOSIS — R19.7 DIARRHEA, UNSPECIFIED TYPE: Primary | ICD-10-CM

## 2018-11-12 PROCEDURE — 99213 OFFICE O/P EST LOW 20 MIN: CPT | Performed by: INTERNAL MEDICINE

## 2018-11-12 RX ORDER — DIPHENOXYLATE HCL/ATROPINE 2.5-.025MG
1-2 TABLET ORAL 4 TIMES DAILY PRN
Qty: 100 TABLET | Refills: 1 | Status: SHIPPED | OUTPATIENT
Start: 2018-11-12 | End: 2019-04-02

## 2018-11-12 ASSESSMENT — ANXIETY QUESTIONNAIRES
GAD7 TOTAL SCORE: 1
7. FEELING AFRAID AS IF SOMETHING AWFUL MIGHT HAPPEN: NOT AT ALL
1. FEELING NERVOUS, ANXIOUS, OR ON EDGE: SEVERAL DAYS
6. BECOMING EASILY ANNOYED OR IRRITABLE: NOT AT ALL
4. TROUBLE RELAXING: NOT AT ALL
2. NOT BEING ABLE TO STOP OR CONTROL WORRYING: NOT AT ALL
7. FEELING AFRAID AS IF SOMETHING AWFUL MIGHT HAPPEN: NOT AT ALL
3. WORRYING TOO MUCH ABOUT DIFFERENT THINGS: NOT AT ALL
5. BEING SO RESTLESS THAT IT IS HARD TO SIT STILL: NOT AT ALL

## 2018-11-12 NOTE — MR AVS SNAPSHOT
After Visit Summary   11/12/2018    Su Tinoco    MRN: [id number]           Patient Information     Date Of Birth          1955        Visit Information        Provider Department      11/12/2018 2:20 PM Jessica Smith MD [facility]        Today's Diagnoses     Diarrhea, unspecified type    -  1       Follow-ups after your visit        Additional Services     GASTROENTEROLOGY ADULT REF PROCEDURE ONLY Ramez Almanza [phone]; [facility]       Last Lab Result: Creatinine (mg/dL)       Date                     Value                 04/03/2018               0.87             ----------  Body mass index is 26.74 kg/(m^2).     Needed:  No  Language:  English    Patient will be contacted to schedule procedure.     Please be aware that coverage of these services is subject to the terms and limitations of your health insurance plan.  Call member services at your health plan with any benefit or coverage questions.  Any procedures must be performed at a [facility] facility OR coordinated by your clinic's referral office.    Please bring the following with you to your appointment:    (1) Any X-Rays, CTs or MRIs which have been performed.  Contact the facility where they were done to arrange for  prior to your scheduled appointment.    (2) List of current medications   (3) This referral request   (4) Any documents/labs given to you for this referral                  Who to contact     If you have questions or need follow up information about today's clinic visit or your schedule please contact [facility] directly at [phone].  Normal or non-critical lab and imaging results will be communicated to you by MyChart, letter or phone within 4 business days after the clinic has received the results. If you do not hear from us within 7 days, please contact the clinic through MyChart or phone. If you have a critical or abnormal lab result, we will notify  "you by phone as soon as possible.  Submit refill requests through [employer] or call your pharmacy and they will forward the refill request to us. Please allow 3 business days for your refill to be completed.          Additional Information About Your Visit        [employer] Information     [employer] gives you secure access to your electronic health record. If you see a primary care provider, you can also send messages to your care team and make appointments. If you have questions, please call your primary care clinic.  If you do not have a primary care provider, please call [phone] and they will assist you.        Care EveryWhere ID     This is your Care EveryWhere ID. This could be used by other organizations to access your [place] medical records  [id number]        Your Vitals Were     Pulse Temperature Respirations Height Last Period Pulse Oximetry    77 98.1  F (36.7  C) (Oral) 20 5' 1.75\" (1.568 m) 12/01/2007 99%    BMI (Body Mass Index)                   26.74 kg/m2            Blood Pressure from Last 3 Encounters:   11/12/18 114/70   11/01/18 125/76   04/23/18 142/80    Weight from Last 3 Encounters:   11/12/18 145 lb (65.8 kg)   11/01/18 145 lb (65.8 kg)   04/03/18 142 lb (64.4 kg)              We Performed the Following     GASTROENTEROLOGY ADULT REF PROCEDURE ONLY Ramez Sernage [phone]; [facility]          Today's Medication Changes          These changes are accurate as of 11/12/18 11:59 PM.  If you have any questions, ask your nurse or doctor.               Start taking these medicines.        Dose/Directions    diphenoxylate-atropine 2.5-0.025 MG per tablet   Commonly known as:  LOMOTIL   Used for:  Diarrhea, unspecified type   Started by:  Jessica Smith MD        Dose:  1-2 tablet   Take 1-2 tablets by mouth 4 times daily as needed for diarrhea   Quantity:  100 tablet   Refills:  1         Stop taking these medicines if you haven't already. Please contact your care team if you have " questions.     buPROPion 300 MG 24 hr tablet   Commonly known as:  WELLBUTRIN XL   Stopped by:  Jessica Smith MD           montelukast 10 MG tablet   Commonly known as:  SINGULAIR   Stopped by:  Jessica Smith MD           PREMARIN cream   Generic drug:  conjugated estrogens   Stopped by:  Jessica Smith MD                Where to get your medicines      Some of these will need a paper prescription and others can be bought over the counter.  Ask your nurse if you have questions.     Bring a paper prescription for each of these medications     diphenoxylate-atropine 2.5-0.025 MG per tablet                Primary Care Provider Office Phone # Fax #    Jessica Smith [phone]       Tresa MENENDEZ NICOLLET [address] [address]        Equal Access to Services     CHANEL LARIOS : Dario lyonso Soomi, waaxda luqadaha, qaybta kaalmada adeegyada, christina nicholson. So [facility] [phone].    ATENCIÓN: Si habla español, tiene a cole disposición servicios gratuitos de asistencia lingüística. Ll[facility] [phone].    We comply with applicable federal civil rights laws and [place] laws. We do not discriminate on the basis of race, color, national origin, age, disability, sex, sexual orientation, or gender identity.            Thank you!     Thank you for choosing [facility]  for your care. Our goal is always to provide you with excellent care. Hearing back from our patients is one way we can continue to improve our services. Please take a few minutes to complete the written survey that you may receive in the mail after your visit with us. Thank you!             Your Updated Medication List - Protect others around you: Learn how to safely use, store and throw away your medicines at www.disposemymeds.org.          This list is accurate as of 11/12/18 11:59 PM.  Always use your most recent med list.                   Brand Name Dispense Instructions for use Diagnosis     albuterol 108 (90 Base) MCG/ACT inhaler    PROAIR HFA    2 Inhaler    Inhale 1-2 puffs into the lungs every 6 hours as needed    Mild intermittent asthma, uncomplicated       diphenoxylate-atropine 2.5-0.025 MG per tablet    LOMOTIL    100 tablet    Take 1-2 tablets by mouth 4 times daily as needed for diarrhea    Diarrhea, unspecified type       EPINEPHrine 0.3 MG/0.3ML injection 2-pack    EPIPEN/ADRENACLICK/or ANY BX GENERIC EQUIV    0.6 mL    Inject 0.3 mLs (0.3 mg) into the muscle once as needed for anaphylaxis    Anaphylactic reaction due to peanuts, initial encounter       levothyroxine 88 MCG tablet    SYNTHROID/LEVOTHROID    90 tablet    Take 1 tablet (88 mcg) by mouth daily    Acquired hypothyroidism       PROBIOTIC DAILY PO           traMADol 50 MG tablet    ULTRAM    75 tablet    1 tablet every 6-8 hours as needed    Spondylolisthesis at L5-S1 level       traZODone 100 MG tablet    DESYREL    90 tablet    Take 1 tablet (100 mg) by mouth At Bedtime    Insomnia, unspecified type

## 2018-11-12 NOTE — PROGRESS NOTES
SUBJECTIVE:                                                    Su Tinoco is a 63 year old female who presents to clinic today for the following health issues:       Follow up diarrhea:   This started about 2 weeks ago and is been quite a few stools per day.  She was seen at another clinic 11 days ago and was ordered to have lab testing done.  These tests did not show any specific cause.  She was told to take Imodium but she continues to have a lot of diarrhea, the Imodium only slows it slightly.  She started to take more of it and went on a Miguel diet as advised after calling into this clinic but that has not really changed it.  She is continuing to have 10-15 watery stools a day, has some mild cramping but no blood.        Patient Active Problem List   Diagnosis     Allergic rhinitis     Hypothyroidism     Migraine     Mild intermittent asthma     CARDIOVASCULAR SCREENING; LDL GOAL LESS THAN 160     BRONWYN (generalized anxiety disorder)     Spinal stenosis of lumbar region     Spondylolisthesis at L5-S1 level     Controlled substance agreement signed     Current Outpatient Prescriptions   Medication Sig Dispense Refill     albuterol (ALBUTEROL) 108 (90 BASE) MCG/ACT inhaler Inhale 1-2 puffs into the lungs every 6 hours as needed 2 Inhaler 11     diphenoxylate-atropine (LOMOTIL) 2.5-0.025 MG per tablet Take 1-2 tablets by mouth 4 times daily as needed for diarrhea 100 tablet 1     EPINEPHrine (EPIPEN) 0.3 MG/0.3ML injection Inject 0.3 mLs (0.3 mg) into the muscle once as needed for anaphylaxis 0.6 mL 11     levothyroxine (SYNTHROID/LEVOTHROID) 88 MCG tablet Take 1 tablet (88 mcg) by mouth daily 90 tablet 3     Probiotic Product (PROBIOTIC DAILY PO)        traMADol (ULTRAM) 50 MG tablet 1 tablet every 6-8 hours as needed 75 tablet 1     traZODone (DESYREL) 100 MG tablet Take 1 tablet (100 mg) by mouth At Bedtime 90 tablet 1      Social History   Substance Use Topics     Smoking status: Never Smoker     Smokeless  "tobacco: Never Used     Alcohol use Yes      Comment: rarely- once a month            ROS:  No fever, chills, nausea, vomiting, blood in the stool    OBJECTIVE:     /70  Pulse 77  Temp 98.1  F (36.7  C) (Oral)  Resp 20  Ht 5' 1.75\" (1.568 m)  Wt 145 lb (65.8 kg)  LMP 12/01/2007  SpO2 99%  BMI 26.74 kg/m2  Body mass index is 26.74 kg/(m^2).      Not examined.     ASSESSMENT/PLAN:             1. Diarrhea, unspecified type  Persistent heavy diarrhea, testing for viruses and bacteria been negative so strongly recommend she undergo colonoscopy for diagnosis.  - diphenoxylate-atropine (LOMOTIL) 2.5-0.025 MG per tablet; Take 1-2 tablets by mouth 4 times daily as needed for diarrhea  Dispense: 100 tablet; Refill: 1        Jessica Smith MD  [facility]      Answers for HPI/ROS submitted by the patient on 11/12/2018   BRONWYN 7 TOTAL SCORE: 1    "

## 2018-11-13 ENCOUNTER — TRANSFERRED RECORDS (OUTPATIENT)
Dept: HEALTH INFORMATION MANAGEMENT | Facility: CLINIC | Age: 63
End: 2018-11-13

## 2018-11-13 ASSESSMENT — ANXIETY QUESTIONNAIRES: GAD7 TOTAL SCORE: 1

## 2018-11-13 ASSESSMENT — ASTHMA QUESTIONNAIRES: ACT_TOTALSCORE: 25

## 2018-11-19 ENCOUNTER — TRANSFERRED RECORDS (OUTPATIENT)
Dept: HEALTH INFORMATION MANAGEMENT | Facility: CLINIC | Age: 63
End: 2018-11-19

## 2018-11-20 ENCOUNTER — MYC MEDICAL ADVICE (OUTPATIENT)
Dept: INTERNAL MEDICINE | Facility: CLINIC | Age: 63
End: 2018-11-20

## 2018-11-26 ENCOUNTER — MYC MEDICAL ADVICE (OUTPATIENT)
Dept: INTERNAL MEDICINE | Facility: CLINIC | Age: 63
End: 2018-11-26

## 2018-11-27 ENCOUNTER — TRANSFERRED RECORDS (OUTPATIENT)
Dept: HEALTH INFORMATION MANAGEMENT | Facility: CLINIC | Age: 63
End: 2018-11-27

## 2018-11-30 ENCOUNTER — MYC MEDICAL ADVICE (OUTPATIENT)
Dept: INTERNAL MEDICINE | Facility: CLINIC | Age: 63
End: 2018-11-30

## 2018-11-30 DIAGNOSIS — K52.831 COLLAGENOUS COLITIS: ICD-10-CM

## 2018-12-05 PROBLEM — K52.831 COLLAGENOUS COLITIS: Status: ACTIVE | Noted: 2018-12-05

## 2018-12-05 RX ORDER — BUDESONIDE 3 MG/1
3 CAPSULE, COATED PELLETS ORAL 3 TIMES DAILY
COMMUNITY
Start: 2018-12-05 | End: 2019-04-02

## 2018-12-07 ENCOUNTER — TRANSFERRED RECORDS (OUTPATIENT)
Dept: HEALTH INFORMATION MANAGEMENT | Facility: CLINIC | Age: 63
End: 2018-12-07

## 2018-12-26 DIAGNOSIS — M43.17 SPONDYLOLISTHESIS AT L5-S1 LEVEL: ICD-10-CM

## 2018-12-26 NOTE — TELEPHONE ENCOUNTER
Tramadol      Last Written Prescription Date:  02/05/18  Last Fill Quantity: 75,   # refills: 1  Last Office Visit: 11/12/18  Future Office visit:       Routing refill request to provider for review/approval because:  Drug not on the [facility], [facility] or [facility] refill protocol or controlled substance

## 2018-12-27 RX ORDER — TRAMADOL HYDROCHLORIDE 50 MG/1
TABLET ORAL
Qty: 75 TABLET | Refills: 1 | Status: SHIPPED | OUTPATIENT
Start: 2018-12-27 | End: 2019-05-23

## 2019-01-07 ENCOUNTER — TRANSFERRED RECORDS (OUTPATIENT)
Dept: HEALTH INFORMATION MANAGEMENT | Facility: CLINIC | Age: 64
End: 2019-01-07

## 2019-01-21 ENCOUNTER — OFFICE VISIT (OUTPATIENT)
Dept: URGENT CARE | Facility: URGENT CARE | Age: 64
End: 2019-01-21
Payer: COMMERCIAL

## 2019-01-21 ENCOUNTER — TELEPHONE (OUTPATIENT)
Dept: INTERNAL MEDICINE | Facility: CLINIC | Age: 64
End: 2019-01-21

## 2019-01-21 VITALS
HEART RATE: 62 BPM | OXYGEN SATURATION: 99 % | SYSTOLIC BLOOD PRESSURE: 120 MMHG | DIASTOLIC BLOOD PRESSURE: 74 MMHG | TEMPERATURE: 98 F

## 2019-01-21 DIAGNOSIS — T23.251A PARTIAL THICKNESS BURN OF PALM OF RIGHT HAND, INITIAL ENCOUNTER: Primary | ICD-10-CM

## 2019-01-21 DIAGNOSIS — K52.831 COLLAGENOUS COLITIS: ICD-10-CM

## 2019-01-21 PROCEDURE — 99214 OFFICE O/P EST MOD 30 MIN: CPT | Performed by: FAMILY MEDICINE

## 2019-01-21 RX ORDER — CEPHALEXIN 500 MG/1
500 CAPSULE ORAL 4 TIMES DAILY
Qty: 40 CAPSULE | Refills: 0 | Status: SHIPPED | OUTPATIENT
Start: 2019-01-21 | End: 2019-04-02
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
## 2019-01-21 NOTE — PATIENT INSTRUCTIONS
Patient Education     Second-Degree Burn  A burn occurs when skin is exposed to too much heat, sun, or harsh chemicals. A second-degree burn (partial-thickness burn) is deeper than a first-degree burn (superficial burn). It usually causes a blister to form. The blister may remain intact and gradually go away on its own. Or it may break open. The goal of treatment is to relieve pain and stop infection while the burn heals.  Home care  Use pain medicine as directed. If no pain medicine was prescribed, you may use over-the-counter medicine to control pain. If you have chronic liver or kidney disease, talk with your healthcare provider before using acetaminophen or ibuprofen. Also talk with your provider if you've had a stomach ulcer or GI bleeding.  General care    On the first day, you may put a cool compress on the wound to ease pain. A cool compress is a small towel soaked in cool water.    If you were sent home with the blister intact, don't break the blister. The risk for infection is greater if the blister breaks. If a bandage was applied, change it once a day, unless told otherwise. If the bandage becomes wet or soiled, change it as soon as you can.    Sometimes an infection may occur even with proper treatment. Check the burn daily for the signs of infection listed below.    Eat more calories and protein until your wound is healed.    Wear a hat, sunscreen, and long sleeves while in the sun to protect the skin.    Don't pick or scratch at the wound. Use over-the-counter medicines like diphenhydramine for itching.    Avoid tight-fitting clothes.  To change a bandage:    Wash your hands.    Take off the old bandage. If the bandage sticks, soak it off under warm running water.    Once the bandage is off, gently wash the burn area with mild soap and warm water to remove any cream, ointment, ooze, or scab. You may do this in a sink, under a tub faucet, or in the shower. Rinse off the soap and gently pat dry with a  clean towel.    Check for signs of infection listed below.    Put any prescribed antibiotic cream or ointment on the wound.    Cover the burn with nonstick gauze. Then wrap it with the bandage material.  Follow-up care  Follow up with your healthcare provider, or as advised.  When to seek medical advice  Call your healthcare provider right away if you have any of these signs of infection:    Fever of 100.4 F (38 C) or higher, or as directed by your healthcare provider    Pain that gets worse    Redness or swelling that gets worse    Pus comes from the burn    Red streaks in your skin coming from the burn    Wound doesn't appear to be healing    Nausea or vomiting   Date Last Reviewed: 1/1/2017 2000-2018 [employer]. [address] [place] [address], [place] [address]. All rights reserved. This information is not intended as a substitute for professional medical care. Always follow your healthcare professional's instructions.

## 2019-01-22 NOTE — PROGRESS NOTES
SUBJECTIVE:  Chief Complaint   Patient presents with     Urgent Care     Burn     Rt hand burn from hod pan handle last night- looking leathery and blistered, painful      Su Tinoco is a 63 year old female who presents to the clinic today for a initial evaluation of a burn to the  right  Hand/ palm.  Injury occurred 1 day ago. 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      Mild intermittent asthma     sees allergist     Other specified disorders of thyroid      Spinal stenosis of lumbar region     L5, S1     Spondylolisthesis at L5-S1 level      Sprain of unspecified site of back      Trochanteric bursitis of right hip      Unspecified hypothyroidism      Patient Active Problem List   Diagnosis     Allergic rhinitis     Hypothyroidism     Migraine     Mild intermittent asthma     CARDIOVASCULAR SCREENING; LDL GOAL LESS THAN 160     BRONWYN (generalized anxiety disorder)     Spinal stenosis of lumbar region     Spondylolisthesis at L5-S1 level     Controlled substance agreement signed     Collagenous colitis       ALLERGIES:  Nuts; Compazine; Erythromycin; and Prochlorperazine      Current Outpatient Medications on File Prior to Visit:  albuterol (ALBUTEROL) 108 (90 BASE) MCG/ACT inhaler Inhale 1-2 puffs into the lungs every 6 hours as needed   levothyroxine (SYNTHROID/LEVOTHROID) 88 MCG tablet Take 1 tablet (88 mcg) by mouth daily   Probiotic Product (PROBIOTIC DAILY PO)    traMADol (ULTRAM) 50 MG tablet 1 tablet every 6-8 hours as needed    traZODone (DESYREL) 100 MG tablet Take 1 tablet (100 mg) by mouth At Bedtime   budesonide (ENTOCORT EC) 3 MG EC capsule Take 1 capsule (3 mg) by mouth 3 times daily   diphenoxylate-atropine (LOMOTIL) 2.5-0.025 MG per tablet Take 1-2 tablets by mouth 4 times daily as needed for diarrhea (Patient not taking: Reported on 1/21/2019)   EPINEPHrine (EPIPEN) 0.3 MG/0.3ML injection Inject 0.3 mLs (0.3 mg) into the muscle once as needed for anaphylaxis     No current facility-administered medications on file prior to visit.     Social History     Tobacco Use     Smoking status: Never Smoker     Smokeless tobacco: Never Used   Substance Use Topics     Alcohol use: Yes     Comment: rarely- once a month       Family History   Problem Relation Age of Onset     Eye Disorder Mother         iritis and glaucoma     Thyroid Disease Mother      Diabetes Father      Hypertension Father      Cancer Father         Melanoma     Cardiovascular Father         CHF     Diabetes Sister         type 2     Diabetes Sister 50        type 2 diabetes     Breast Cancer Sister         38     Blood Disease Daughter         Beta Thallesemia Minor     Heart Disease Maternal Grandmother         cardiomegaly       ROS:  CONSTITUTIONAL:NEGATIVE for fever, chills, change in weight  EYES: NEGATIVE for vision changes or irritation  ENT/MOUTH: NEGATIVE for ear, mouth and throat problems  RESP:NEGATIVE for significant cough or SOB    OBJECTIVE:     /74 (BP Location: Right arm, Patient Position: Chair, Cuff Size: Adult Large)   Pulse 62   Temp 98  F (36.7  C) (Oral)   LMP 12/01/2007   SpO2 99%     GENERAL APPEARANCE: alert, moderate distress and cooperative     EYES:   EOMI,   conjunctiva clear  HENT:   External ears with no swelling or lesions   Nose and lips without  Swelling, ulcers, erythema or lesions  NECK:   normal pain free ROM  RESP:   no labored respirations, no tachypnea  EXTREMITIES:   Full ROM without expression of pain or limitation x 4  extremities  NEURO:   Normal strength and tone, ambulation without difficulty,   normal speech and mentation  SKIN:  no suspicious lesions or rashes  PSYCH:   mentation and affect appears normal and patient appearance--appropriately groomed       SKIN:   Burn area #1   Location:  right   Palm of hand base of the thumb  Size of the burn area: 4cm x 2cm  Well defined and demarcated?  yes  Blisters present?:  Yes-  One single area of devitalized skin with little fluid underneath  Clear drainage present?:  No drainage  Purulent drainage present?:  no  New skin present?:  no  Good range of motion?:  yes  Degree of injury:  Second degree  Tetanus status up to date?:  yes    SKIN:   Burn area #2  Location: right   Palm 3rd MCP region  Size of the burn area: 1 cm  x 2 cm  Well defined and demarcated?  yes  Blisters present?:  Yes-  One single blister  Clear drainage present?:  No drainage  Purulent drainage present?:  no  Devitalized skin present?: yes- the blister roof  New skin present?:  no  Good range of motion?:  yes  Degree of injury:  Second degree    SKIN:   Burn area #3   Location: right   3rd finger  Size of the burn area: 1 cm  x 1 cm palmar surface proximal phalanyx  1 x 1 cm palmar surface middle phalanyx  Well defined and demarcated?  yes  Blisters present?:  Yes-  Single large blister each site  Clear drainage present?:  No drainage  Purulent drainage present?:  no  Devitalized skin present?:  Yes- roof of blister  New skin present?:  no  Good range of motion?:  yes  Degree of injury:  Second degree    ASSESSMENT:  Partial thickness burn of palm of right hand, initial encounter     - cephALEXin (KEFLEX) 500 MG capsule; Take 1 capsule (500 mg) by mouth 4 times daily for 10 days     Keep burned area covered with glove/ clean gauze and ointment/ cream if she has open areas-  For now try to keep the blisters intact to maintain the underlying skin protected from infection    Acetaminophen/ Ibuprofen OTC as  alternative for pain     Monitor for signs of purulent drainage or red streaking proximally which would require prompt re-evaluation in UC, with primary care or ED      Collagenous colitis    Discussed concern of residual immunosuppression from prolonged dosing of steroid and recovery from colitis flare.  She was given cephalexin- but due to her colitis she wants to avoid taking antibiotic that may irritate her colon-  She will monitor her hand wounds and start antibiotic only if signs of increased erythema, purulence , streaking

## 2019-01-31 ENCOUNTER — TRANSFERRED RECORDS (OUTPATIENT)
Dept: HEALTH INFORMATION MANAGEMENT | Facility: CLINIC | Age: 64
End: 2019-01-31

## 2019-02-04 ENCOUNTER — TRANSFERRED RECORDS (OUTPATIENT)
Dept: HEALTH INFORMATION MANAGEMENT | Facility: CLINIC | Age: 64
End: 2019-02-04

## 2019-02-11 ENCOUNTER — TELEPHONE (OUTPATIENT)
Dept: INTERNAL MEDICINE | Facility: CLINIC | Age: 64
End: 2019-02-11

## 2019-02-11 DIAGNOSIS — R68.83 CHILLS: Primary | ICD-10-CM

## 2019-02-11 NOTE — TELEPHONE ENCOUNTER
Reason for Call:  Other call back    Detailed comments: since she has been on steroid meds has experienced cold and hot flashes.  Has this thrown off her thyroid? would like advice    Phone Number Patient can be reached at: Cell number on file:    Telephone Information:   Mobile [phone]       Best Time: asap    Can we leave a detailed message on this number? YES    Call taken on 2/11/2019 at 7:41 AM by Coleen Moncada

## 2019-02-12 NOTE — TELEPHONE ENCOUNTER
Pt was on Budesonide taper for 8 weeks for her Colitis.     Since then, has been experiencing cold and hot flashes.     She is asking if needs Thyroid checked.     TSH   Date Value Ref Range Status   04/03/2018 1.58 0.40 - 4.00 mU/L Final

## 2019-02-13 NOTE — TELEPHONE ENCOUNTER
I would check thyroid as well as cbc and esr to be sure there is no sign of infection. Orders have been placed

## 2019-02-15 NOTE — TELEPHONE ENCOUNTER
Detailed message left on pt's cell relaying that orders have been placed and she just needs to schedule a lab-only appointment.

## 2019-02-20 ENCOUNTER — HOSPITAL ENCOUNTER (OUTPATIENT)
Dept: LAB | Facility: CLINIC | Age: 64
Discharge: HOME | End: 2019-02-20
Attending: INTERNAL MEDICINE | Admitting: INTERNAL MEDICINE
Payer: COMMERCIAL

## 2019-02-20 DIAGNOSIS — R68.83 CHILLS: ICD-10-CM

## 2019-02-20 LAB
ERYTHROCYTE [DISTWIDTH] IN BLOOD BY AUTOMATED COUNT: 13.7 % (ref 10–15)
ERYTHROCYTE [SEDIMENTATION RATE] IN BLOOD BY WESTERGREN METHOD: 9 MM/H (ref 0–30)
HCT VFR BLD AUTO: 42.8 % (ref 35–47)
HGB BLD-MCNC: 13.8 G/DL (ref 11.7–15.7)
MCH RBC QN AUTO: 30.6 PG (ref 26.5–33)
MCHC RBC AUTO-ENTMCNC: 32.2 G/DL (ref 31.5–36.5)
MCV RBC AUTO: 95 FL (ref 78–100)
PLATELET # BLD AUTO: 320 10E9/L (ref 150–450)
RBC # BLD AUTO: 4.51 10E12/L (ref 3.8–5.2)
TSH SERPL DL<=0.005 MIU/L-ACNC: 0.78 MU/L (ref 0.4–4)
WBC # BLD AUTO: 7.1 10E9/L (ref 4–11)

## 2019-02-20 PROCEDURE — 36415 COLL VENOUS BLD VENIPUNCTURE: CPT | Performed by: INTERNAL MEDICINE

## 2019-02-20 PROCEDURE — 85027 COMPLETE CBC AUTOMATED: CPT | Performed by: INTERNAL MEDICINE

## 2019-02-20 PROCEDURE — 85652 RBC SED RATE AUTOMATED: CPT | Performed by: INTERNAL MEDICINE

## 2019-02-20 PROCEDURE — 84443 ASSAY THYROID STIM HORMONE: CPT | Performed by: INTERNAL MEDICINE

## 2019-03-01 ENCOUNTER — MYC REFILL (OUTPATIENT)
Dept: INTERNAL MEDICINE | Facility: CLINIC | Age: 64
End: 2019-03-01

## 2019-03-01 DIAGNOSIS — J45.20 MILD INTERMITTENT ASTHMA, UNCOMPLICATED: ICD-10-CM

## 2019-03-02 ENCOUNTER — MYC REFILL (OUTPATIENT)
Dept: INTERNAL MEDICINE | Facility: CLINIC | Age: 64
End: 2019-03-02

## 2019-03-02 DIAGNOSIS — J45.20 MILD INTERMITTENT ASTHMA, UNCOMPLICATED: ICD-10-CM

## 2019-03-05 RX ORDER — ALBUTEROL SULFATE 90 UG/1
1-2 AEROSOL, METERED RESPIRATORY (INHALATION) EVERY 6 HOURS PRN
Qty: 2 INHALER | Refills: 11 | Status: SHIPPED | OUTPATIENT
Start: 2019-03-05 | End: 2020-03-30

## 2019-03-05 RX ORDER — ALBUTEROL SULFATE 90 UG/1
1-2 AEROSOL, METERED RESPIRATORY (INHALATION) EVERY 6 HOURS PRN
Qty: 2 INHALER | Refills: 11 | OUTPATIENT
Start: 2019-03-05

## 2019-03-05 NOTE — TELEPHONE ENCOUNTER
"Requested Prescriptions   Pending Prescriptions Disp Refills     albuterol (PROAIR HFA) 108 (90 Base) MCG/ACT inhaler 2 Inhaler 11     Sig: Inhale 1-2 puffs into the lungs every 6 hours as needed    Asthma Maintenance Inhalers - Anticholinergics Passed - 3/1/2019  3:55 PM       Passed - Patient is age 12 years or older       Passed - Asthma control assessment score within normal limits in last 6 months    Please review ACT score.          Passed - Medication is active on med list       Passed - Recent (6 mo) or future (30 days) visit within the authorizing provider's specialty    Patient had office visit in the last 6 months or has a visit in the next 30 days with authorizing provider or within the authorizing provider's specialty.  See \"Patient Info\" tab in inbasket, or \"Choose Columns\" in Meds & Orders section of the refill encounter.            Last ACT = 25 on 11/12/18.    Routing refill request to provider for review/approval because:  A break in medication--last refill 9/1/15.  Patient is over-due for appointment for asthma.    Please advise, thanks.    This request is forwarded to Chary Alex NP who covers Dr Smith's patients with the letters M, N          "

## 2019-03-07 ENCOUNTER — TRANSFERRED RECORDS (OUTPATIENT)
Dept: HEALTH INFORMATION MANAGEMENT | Facility: CLINIC | Age: 64
End: 2019-03-07

## 2019-03-19 DIAGNOSIS — G47.00 INSOMNIA, UNSPECIFIED TYPE: ICD-10-CM

## 2019-03-19 NOTE — TELEPHONE ENCOUNTER
"Requested Prescriptions   Pending Prescriptions Disp Refills     traZODone (DESYREL) 100 MG tablet [Pharmacy Med Name: TRAZODONE TAB 100MG]  Last Written Prescription Date:  10/30/18  Last Fill Quantity: 90,  # refills: 1   Last office visit: 11/12/2018 with prescribing provider:  Sarah     Future Office Visit:   Next 5 appointments (look out 90 days)    Apr 02, 2019 10:00 AM CDT  Pre-Op physical with Jessica Smith MD  [facility] ([facility] [address]  [facility] [address]  [phone]   Apr 15, 2019  8:40 AM CDT  PHYSICAL with Jessica Smith MD  [facility] ([facility]) [address]  [facility] [address]  [phone]          90 tablet 0     Sig: TAKE 1 TABLET BY MOUTH ONCE A DAY AT BEDTIME    Serotonin Modulators Passed - 3/19/2019  9:12 AM       Passed - Recent (12 mo) or future (30 days) visit within the authorizing provider's specialty    Patient had office visit in the last 12 months or has a visit in the next 30 days with authorizing provider or within the authorizing provider's specialty.  See \"Patient Info\" tab in inbasket, or \"Choose Columns\" in Meds & Orders section of the refill encounter.             Passed - Medication is active on med list       Passed - Patient is age 18 or older       Passed - No active pregnancy on record       Passed - No positive pregnancy test in past 12 months          "

## 2019-03-20 RX ORDER — TRAZODONE HYDROCHLORIDE 100 MG/1
TABLET ORAL
Qty: 90 TABLET | Refills: 0 | Status: SHIPPED | OUTPATIENT
Start: 2019-03-20 | End: 2019-05-23

## 2019-03-20 NOTE — TELEPHONE ENCOUNTER
Prescription approved per [facility] Refill Protocol.    Next 5 appointments (look out 90 days)    Apr 02, 2019 10:00 AM CDT  Pre-Op physical with Jessica Smith MD  [facility] ([facility] [address]  [facility] [address]  [phone]   Apr 15, 2019  8:40 AM CDT  PHYSICAL with Jessica Smith MD  [facility] ([facility]) [address]  [facility] [address]  [phone]

## 2019-03-21 DIAGNOSIS — G47.00 INSOMNIA, UNSPECIFIED TYPE: ICD-10-CM

## 2019-03-21 RX ORDER — TRAZODONE HYDROCHLORIDE 100 MG/1
TABLET ORAL
Qty: 90 TABLET | Refills: 0 | OUTPATIENT
Start: 2019-03-21

## 2019-03-21 NOTE — TELEPHONE ENCOUNTER
"Requested Prescriptions   Pending Prescriptions Disp Refills     traZODone (DESYREL) 100 MG tablet [Pharmacy Med Name: TRAZODONE TAB 100MG] 90 tablet 0    Last Written Prescription Date:  03/20/2019  Last Fill Quantity: 90,  # refills: 0   Last office visit: 11/12/2018 with prescribing provider:     Future Office Visit:   Next 5 appointments (look out 90 days)    Apr 02, 2019 10:00 AM CDT  Pre-Op physical with Jessica Smith MD  [facility] [address]  [facility] [address]  [phone]   Apr 15, 2019  8:40 AM CDT  PHYSICAL with Jessica Smith MD  [facility] ([facility]) [address]  [facility] [address]  [phone]        Sig: TAKE 1 TABLET BY MOUTH ONCE A DAY AT BEDTIME    Serotonin Modulators Passed - 3/21/2019  9:10 AM       Passed - Recent (12 mo) or future (30 days) visit within the authorizing provider's specialty    Patient had office visit in the last 12 months or has a visit in the next 30 days with authorizing provider or within the authorizing provider's specialty.  See \"Patient Info\" tab in inbasket, or \"Choose Columns\" in Meds & Orders section of the refill encounter.             Passed - Medication is active on med list       Passed - Patient is age 18 or older       Passed - No active pregnancy on record       Passed - No positive pregnancy test in past 12 months        "

## 2019-03-25 ENCOUNTER — MYC MEDICAL ADVICE (OUTPATIENT)
Dept: INTERNAL MEDICINE | Facility: CLINIC | Age: 64
End: 2019-03-25

## 2019-03-26 ENCOUNTER — MYC MEDICAL ADVICE (OUTPATIENT)
Dept: INTERNAL MEDICINE | Facility: CLINIC | Age: 64
End: 2019-03-26

## 2019-03-26 NOTE — TELEPHONE ENCOUNTER
Prescription was escribed 3/20/19. Call to pharmacy. Prescription was not received. Verbal order given.

## 2019-04-02 ENCOUNTER — OFFICE VISIT (OUTPATIENT)
Dept: INTERNAL MEDICINE | Facility: CLINIC | Age: 64
End: 2019-04-02
Payer: COMMERCIAL

## 2019-04-02 VITALS
SYSTOLIC BLOOD PRESSURE: 112 MMHG | BODY MASS INDEX: 28.85 KG/M2 | OXYGEN SATURATION: 99 % | TEMPERATURE: 97.8 F | WEIGHT: 152.8 LBS | HEART RATE: 81 BPM | HEIGHT: 61 IN | RESPIRATION RATE: 17 BRPM | DIASTOLIC BLOOD PRESSURE: 72 MMHG

## 2019-04-02 DIAGNOSIS — Z01.818 PREOP GENERAL PHYSICAL EXAM: Primary | ICD-10-CM

## 2019-04-02 DIAGNOSIS — M19.049 OSTEOARTHRITIS OF FINGER, UNSPECIFIED LATERALITY: ICD-10-CM

## 2019-04-02 PROCEDURE — 93000 ELECTROCARDIOGRAM COMPLETE: CPT | Performed by: INTERNAL MEDICINE

## 2019-04-02 PROCEDURE — 99214 OFFICE O/P EST MOD 30 MIN: CPT | Performed by: INTERNAL MEDICINE

## 2019-04-02 ASSESSMENT — MIFFLIN-ST. JEOR: SCORE: 1199.44

## 2019-04-02 NOTE — PROGRESS NOTES
[place] [address]  [facility] [address]  [phone]  Dept: [phone]    PRE-OP EVALUATION:  Today's date: 2019    Su Tinoco (: 1955) presents for pre-operative evaluation assessment as requested by Dr. Guzman.  She requires evaluation and anesthesia risk assessment prior to undergoing surgery/procedure for treatment of Lt ring finger arthrodesis .    Fax number for surgical facility: [phone]  Primary Physician: Jessica Smith  Type of Anesthesia Anticipated: General    Patient has a Health Care Directive or Living Will:  NO    Preop Questions 2019   Who is doing your surgery? dr guzman   What are you having done? Left 4th finger arthrodesis   Date of Surgery/Procedure: 2019 0830   Facility or Hospital where procedure/surgery will be performed: [facility] [place]   1.  Do you have a history of Heart attack, stroke, stent, coronary bypass surgery, or other heart surgery? No   2.  Do you ever have any pain or discomfort in your chest? No   3.  Do you have a history of  Heart Failure? No   4.   Are you troubled by shortness of breath when:  walking on a level surface, or up a slight hill, or at night? No   5.  Do you currently have a cold, bronchitis or other respiratory infection? No   6.  Do you have a cough, shortness of breath, or wheezing? No   7.  Do you sometimes get pains in the calves of your legs when you walk? No   8. Do you or anyone in your family have previous history of blood clots? No   9.  Do you or does anyone in your family have a serious bleeding problem such as prolonged bleeding following surgeries or cuts? No   10. Have you ever had problems with anemia or been told to take iron pills? No   11. Have you had any abnormal blood loss such as black, tarry or bloody stools, or abnormal vaginal bleeding? No   12. Have you ever had a blood transfusion? No   13. Have you or any of your relatives ever had  problems with anesthesia? No   14. Do you have sleep apnea, excessive snoring or daytime drowsiness? No   15. Do you have any prosthetic heart valves? No   16. Do you have prosthetic joints? No   17. Is there any chance that you may be pregnant? No         HPI:     HPI related to upcoming procedure: She has osteoarthritis of the fingers causing significant discomfort at the left fourth finger.      ASTHMA - Patient has a longstanding history of mildAsthma . Patient has been doing well overall noting NO SYMPTOMS and continues on medication regimen consisting of albuterol without adverse reactions or side effects.                                                                                                                                               .    MEDICAL HISTORY:     Patient Active Problem List    Diagnosis Date Noted     Collagenous colitis 12/05/2018     Priority: Medium     Controlled substance agreement signed 04/25/2016     Priority: Medium     Spinal stenosis of lumbar region      Priority: Medium     L5, S1       Spondylolisthesis at L5-S1 level      Priority: Medium     BRONWYN (generalized anxiety disorder) 11/09/2014     Priority: Medium     CARDIOVASCULAR SCREENING; LDL GOAL LESS THAN 160 10/31/2010     Priority: Medium     Mild intermittent asthma      Priority: Medium     Migraine 01/27/2005     Priority: Medium     Problem list name updated by automated process. Provider to review       Allergic rhinitis 07/21/2003     Priority: Medium     Problem list name updated by automated process. Provider to review       Hypothyroidism 07/21/2003     Priority: Medium     Problem list name updated by automated process. 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      Mild intermittent asthma     sees allergist     Other specified disorders of thyroid      Spinal stenosis of lumbar region      L5, S1     Spondylolisthesis at L5-S1 level      Sprain of unspecified site of back      Trochanteric bursitis of right hip      Unspecified hypothyroidism      Past Surgical History:   Procedure Laterality Date     C NONSPECIFIC PROCEDURE      Laparoscopies IUD infection     C NONSPECIFIC PROCEDURE      removal of myxoid tumors fingers     C NONSPECIFIC PROCEDURE      sphincterotomy of anus     C/SECTION, LOW TRANSVERSE      , Low Transverse     TONSILLECTOMY & ADENOIDECTOMY       TUBAL LIGATION       Current Outpatient Medications   Medication Sig Dispense Refill     albuterol (PROAIR HFA) 108 (90 Base) MCG/ACT inhaler Inhale 1-2 puffs into the lungs every 6 hours as needed 2 Inhaler 11     EPINEPHrine (EPIPEN) 0.3 MG/0.3ML injection Inject 0.3 mLs (0.3 mg) into the muscle once as needed for anaphylaxis 0.6 mL 11     levothyroxine (SYNTHROID/LEVOTHROID) 88 MCG tablet Take 1 tablet (88 mcg) by mouth daily 90 tablet 3     Probiotic Product (PROBIOTIC DAILY PO)        traMADol (ULTRAM) 50 MG tablet 1 tablet every 6-8 hours as needed 75 tablet 1     traZODone (DESYREL) 100 MG tablet TAKE 1 TABLET BY MOUTH ONCE A DAY AT BEDTIME 90 tablet 0     OTC products: None, except as noted above    Allergies   Allergen Reactions     Nuts Anaphylaxis     peanuts, nara nuts     Compazine      Lock jaw, vision problems and shakes     Erythromycin GI Disturbance     Prochlorperazine      Jaw spasm and vision loss      Latex Allergy: NO    Social History     Tobacco Use     Smoking status: Never Smoker     Smokeless tobacco: Never Used   Substance Use Topics     Alcohol use: Yes     Comment: rarely- once a month     History   Drug Use No       REVIEW OF SYSTEMS:   GENERAL: negative for, fever, chills, weight loss  EYES: negative  ENT: negative  RESPIRATORY: No dyspnea on exertion and No cough  CARDIOVASCULAR: negative for, palpitations, tachycardia, irregular heart beat and chest pain  GI: negative for, nausea, vomiting,  "abdominal pain, melena and hematochezia  : negative  MUSCULOSKELETAL: back pain  NEUROLOGIC: negative for, headaches, seizures, local weakness, numbness or tingling of hands and positive tingling legs from back  SKIN: negative  ENDOCRINE: negative       EXAM:       Patient is alert, oriented, cooperative in no acute distress.  /72   Pulse 81   Temp 97.8  F (36.6  C) (Oral)   Resp 17   Ht 1.556 m (5' 1.25\")   Wt 69.3 kg (152 lb 12.8 oz)   LMP 12/01/2007   SpO2 99%   BMI 28.64 kg/m      HEENT: PERRL, EOMI, TM's are normal. Oropharynx is clear.  NECK: No lymphadenopathy or thyromegaly. Carotid pulses full without bruits.  LUNGS: clear  CV: normal S1, S2 without murmur, S3 or S4 present. Pulses are 2/2 throughout. No JVD.  ABDOMEN: Bowel sounds present, nontender without hepatosplenomegaly. Liver is normal size to percussion.  EXTREMITIES: no edema present, unremarkable joints  NEUROLOGIC: Cranial nerves II-XII intact, reflexes 2/4 throughout, strength 5/5, sensation grossly intact, gait normal.  SKIN: without rashes or significant lesions     DIAGNOSTICS:   EKG: sinus bradycardia, normal axis, normal intervals, no acute ST/T changes c/w ischemia, no LVH by voltage criteria, unchanged from previous tracings    Recent Labs   Lab Test 02/20/19  1619 04/03/18  0909 09/14/17  0853  06/22/12  0920   HGB 13.8  --   --   --  13.3     --   --   --   --    NA  --  138 140   < >  --    POTASSIUM  --  4.1 4.3   < >  --    CR  --  0.87 0.86   < >  --     < > = values in this interval not displayed.        IMPRESSION:   Reason for surgery/procedure: osteoarthritis of finger  Diagnosis/reason for consult: preop    The proposed surgical procedure is considered LOW risk.    REVISED CARDIAC RISK INDEX  The patient has the following serious cardiovascular risks for perioperative complications such as (MI, PE, VFib and 3  AV Block):  No serious cardiac risks  INTERPRETATION: 0 risks: Class I (very low risk - 0.4% " complication rate)    The patient has the following additional risks for perioperative complications:  No identified additional risks      ICD-10-CM    1. Preop general physical exam Z01.818 EKG 12-lead complete w/read - Clinics   2. Osteoarthritis of finger, unspecified laterality M19.049        RECOMMENDATIONS:             APPROVAL GIVEN to proceed with proposed procedure, without further diagnostic evaluation       Signed Electronically by: Jessica Smith MD    Copy of this evaluation report is provided to requesting physician.    [place] Preop Guidelines    Revised Cardiac Risk Index

## 2019-04-02 NOTE — LETTER
My Asthma Action Plan  Name: Su Tinoco   YOB: 1955  Date: 4/2/2019   My doctor: Jessica Smith MD   My clinic: [facility]        My Control Medicine:   My Rescue Medicine:    My Asthma Severity:   Avoid your asthma triggers:                GREEN ZONE   Good Control    I feel good    No cough or wheeze    Can work, sleep and play without asthma symptoms       Take your asthma control medicine every day.     1. If exercise triggers your asthma, take your rescue medication    15 minutes before exercise or sports, and    During exercise if you have asthma symptoms  2. Spacer to use with inhaler: If you have a spacer, make sure to use it with your inhaler             YELLOW ZONE Getting Worse  I have ANY of these:    I do not feel good    Cough or wheeze    Chest feels tight    Wake up at night   1. Keep taking your Green Zone medications  2. Start taking your rescue medicine:    every 20 minutes for up to 1 hour. Then every 4 hours for 24-48 hours.  3. If you stay in the Yellow Zone for more than 12-24 hours, contact your doctor.  4. If you do not return to the Green Zone in 12-24 hours or you get worse, start taking your oral steroid medicine if prescribed by your provider.           RED ZONE Medical Alert - Get Help  I have ANY of these:    I feel awful    Medicine is not helping    Breathing getting harder    Trouble walking or talking    Nose opens wide to breathe       1. Take your rescue medicine NOW  2. If your provider has prescribed an oral steroid medicine, start taking it NOW  3. Call your doctor NOW  4. If you are still in the Red Zone after 20 minutes and you have not reached your doctor:    Take your rescue medicine again and    Call 911 or go to the emergency room right away    See your regular doctor within 2 weeks of an Emergency Room or Urgent Care visit for follow-up treatment.          Annual Reminders:  Meet with Asthma Educator,  Flu Shot in the Fall, consider  Pneumonia Vaccination for patients with asthma (aged 19 and older).    Pharmacy:    [facility]- [address]  [id number] - [place], [place] - [address]  [facility] - [place], [place] - [address]  [id number] - [place], [place] - [address]  [facility] - [place], [place] - [address] AT [facility] [address]  [facility] - [place], [place] - [address]  [facility] - [place], [place] - [address] AT [facility] [address]                      Asthma Triggers  How To Control Things That Make Your Asthma Worse    Triggers are things that make your asthma worse.  Look at the list below to help you find your triggers and what you can do about them.  You can help prevent asthma flare-ups by staying away from your triggers.      Trigger                                                          What you can do   Cigarette Smoke  Tobacco smoke can make asthma worse. Do not allow smoking in your home, car or around you.  Be sure no one smokes at a child s day care or school.  If you smoke, ask your health care provider for ways to help you quit.  Ask family members to quit too.  Ask your health care provider for a referral to Quit Plan to help you quit smoking, or call 6-424-331-PLAN.     Colds, Flu, Bronchitis  These are common triggers of asthma. Wash your hands often.  Don t touch your eyes, nose or mouth.  Get a flu shot every year.     Dust Mites  These are tiny bugs that live in cloth or carpet. They are too small to see. Wash sheets and blankets in hot water every week.   Encase pillows and mattress in dust mite proof covers.  Avoid having carpet if you can. If you have carpet, vacuum weekly.   Use a dust mask and HEPA vacuum.   Pollen and Outdoor Mold  Some people are allergic to trees, grass, or weed pollen, or molds. Try to keep your windows  closed.  Limit time out doors when pollen count is high.   Ask you health care provider about taking medicine during allergy season.     Animal Dander  Some people are allergic to skin flakes, urine or saliva from pets with fur or feathers. Keep pets with fur or feathers out of your home.    If you can t keep the pet outdoors, then keep the pet out of your bedroom.  Keep the bedroom door closed.  Keep pets off cloth furniture and away from stuffed toys.     Mice, Rats, and Cockroaches  Some people are allergic to the waste from these pests.   Cover food and garbage.  Clean up spills and food crumbs.  Store grease in the refrigerator.   Keep food out of the bedroom.   Indoor Mold  This can be a trigger if your home has high moisture. Fix leaking faucets, pipes, or other sources of water.   Clean moldy surfaces.  Dehumidify basement if it is damp and smelly.   Smoke, Strong Odors, and Sprays  These can reduce air quality. Stay away from strong odors and sprays, such as perfume, powder, hair spray, paints, smoke incense, paint, cleaning products, candles and new carpet.   Exercise or Sports  Some people with asthma have this trigger. Be active!  Ask your doctor about taking medicine before sports or exercise to prevent symptoms.    Warm up for 5-10 minutes before and after sports or exercise.     Other Triggers of Asthma  Cold air:  Cover your nose and mouth with a scarf.  Sometimes laughing or crying can be a trigger.  Some medicines and food can trigger asthma.

## 2019-04-02 NOTE — NURSING NOTE
"/72   Pulse 81   Temp 97.8  F (36.6  C) (Oral)   Resp 17   Ht 1.556 m (5' 1.25\")   Wt 69.3 kg (152 lb 12.8 oz)   LMP 12/01/2007   SpO2 99%   BMI 28.64 kg/m    Patient in for Pre-Op.  Amee Robertson CMA    "

## 2019-04-03 ENCOUNTER — TRANSFERRED RECORDS (OUTPATIENT)
Dept: HEALTH INFORMATION MANAGEMENT | Facility: CLINIC | Age: 64
End: 2019-04-03

## 2019-04-11 ENCOUNTER — TRANSFERRED RECORDS (OUTPATIENT)
Dept: HEALTH INFORMATION MANAGEMENT | Facility: CLINIC | Age: 64
End: 2019-04-11

## 2019-05-17 ENCOUNTER — TRANSFERRED RECORDS (OUTPATIENT)
Dept: HEALTH INFORMATION MANAGEMENT | Facility: CLINIC | Age: 64
End: 2019-05-17

## 2019-05-22 ENCOUNTER — TRANSFERRED RECORDS (OUTPATIENT)
Dept: HEALTH INFORMATION MANAGEMENT | Facility: CLINIC | Age: 64
End: 2019-05-22

## 2019-05-23 ENCOUNTER — OFFICE VISIT (OUTPATIENT)
Dept: INTERNAL MEDICINE | Facility: CLINIC | Age: 64
End: 2019-05-23
Payer: COMMERCIAL

## 2019-05-23 VITALS
HEART RATE: 74 BPM | WEIGHT: 152 LBS | SYSTOLIC BLOOD PRESSURE: 106 MMHG | HEIGHT: 61 IN | OXYGEN SATURATION: 99 % | BODY MASS INDEX: 28.7 KG/M2 | TEMPERATURE: 98.2 F | DIASTOLIC BLOOD PRESSURE: 68 MMHG | RESPIRATION RATE: 18 BRPM

## 2019-05-23 DIAGNOSIS — J45.20 MILD INTERMITTENT ASTHMA WITHOUT COMPLICATION: ICD-10-CM

## 2019-05-23 DIAGNOSIS — E03.9 ACQUIRED HYPOTHYROIDISM: ICD-10-CM

## 2019-05-23 DIAGNOSIS — Z00.00 ENCOUNTER FOR ROUTINE ADULT HEALTH EXAMINATION WITHOUT ABNORMAL FINDINGS: Primary | ICD-10-CM

## 2019-05-23 DIAGNOSIS — Z78.0 ASYMPTOMATIC POSTMENOPAUSAL STATUS: ICD-10-CM

## 2019-05-23 DIAGNOSIS — M43.17 SPONDYLOLISTHESIS AT L5-S1 LEVEL: ICD-10-CM

## 2019-05-23 DIAGNOSIS — G47.00 INSOMNIA, UNSPECIFIED TYPE: ICD-10-CM

## 2019-05-23 PROCEDURE — 99396 PREV VISIT EST AGE 40-64: CPT | Performed by: INTERNAL MEDICINE

## 2019-05-23 RX ORDER — TRAMADOL HYDROCHLORIDE 50 MG/1
TABLET ORAL
Qty: 90 TABLET | Refills: 1 | Status: SHIPPED | OUTPATIENT
Start: 2019-05-23 | End: 2019-07-17

## 2019-05-23 RX ORDER — CEPHALEXIN 500 MG/1
500 CAPSULE ORAL 4 TIMES DAILY
COMMUNITY
End: 2019-09-10

## 2019-05-23 RX ORDER — LEVOTHYROXINE SODIUM 88 UG/1
88 TABLET ORAL DAILY
Qty: 90 TABLET | Refills: 3 | Status: SHIPPED | OUTPATIENT
Start: 2019-05-23 | End: 2020-07-21

## 2019-05-23 RX ORDER — TRAZODONE HYDROCHLORIDE 100 MG/1
50 TABLET ORAL AT BEDTIME
Qty: 90 TABLET | Refills: 3 | Status: SHIPPED | OUTPATIENT
Start: 2019-05-23 | End: 2019-06-04

## 2019-05-23 ASSESSMENT — ENCOUNTER SYMPTOMS
HEMATURIA: 0
ABDOMINAL PAIN: 0
COUGH: 0
CHILLS: 0
CONSTIPATION: 0
HEMATOCHEZIA: 0

## 2019-05-23 ASSESSMENT — MIFFLIN-ST. JEOR: SCORE: 1190.81

## 2019-05-23 NOTE — PATIENT INSTRUCTIONS
Pacifica or Adriana  Or Calm    Edita chocolates at [facility].     Shingrix is the new shingles vaccine. Call insurance to find out if covered, whether covered at a pharmacy or doctor's office and the copay.

## 2019-05-23 NOTE — PROGRESS NOTES
SUBJECTIVE:   CC: Su Tinoco is an 64 year old woman who presents for preventive health visit.     Healthy Habits:     Getting at least 3 servings of Calcium per day:  Yes    Bi-annual eye exam:  Yes    Dental care twice a year:  Yes    Sleep apnea or symptoms of sleep apnea:  None    Diet:  Low salt and Low fat/cholesterol    Duration of exercise:  15-30 minutes    Taking medications regularly:  Yes    Barriers to taking medications:  None    Medication side effects:  None    PHQ-2 Total Score: 0    Additional concerns today:  No    Current concerns:  She will be having back surgery in the future.  This will be a spinal fusion.  She is currently taking tramadol 2/day.      Today's PHQ-2 Score:   PHQ-2 ( 1999 Pfizer) 5/23/2019   Q1: Little interest or pleasure in doing things 0   Q2: Feeling down, depressed or hopeless 0   PHQ-2 Score 0   Q1: Little interest or pleasure in doing things Not at all   Q2: Feeling down, depressed or hopeless Not at all   PHQ-2 Score 0       Abuse: Current or Past(Physical, Sexual or Emotional)- No  Do you feel safe in your environment? Yes    Social History     Tobacco Use     Smoking status: Never Smoker     Smokeless tobacco: Never Used   Substance Use Topics     Alcohol use: Yes     Comment: rarely- once a month         Alcohol Use 5/23/2019   Prescreen: >3 drinks/day or >7 drinks/week? No   Prescreen: >3 drinks/day or >7 drinks/week? -       Reviewed orders with patient.  Reviewed health maintenance and updated orders accordingly - Yes      Mammogram Screening: Patient over age 50, mutual decision to screen reflected in health maintenance.    Pertinent mammograms are reviewed under the imaging tab.  History of abnormal Pap smear: NO - age 30-65 PAP every 5 years with negative HPV co-testing recommended     Reviewed and updated as needed this visit by clinical staff  Tobacco  Allergies  Meds  Med Hx  Surg Hx  Fam Hx  Soc Hx        Reviewed and updated as needed this  "visit by Provider        Patient Active Problem List   Diagnosis     Allergic rhinitis     Hypothyroidism     Migraine     Mild intermittent asthma     CARDIOVASCULAR SCREENING; LDL GOAL LESS THAN 160     BRONWYN (generalized anxiety disorder)     Spinal stenosis of lumbar region     Spondylolisthesis at L5-S1 level     Controlled substance agreement signed     Collagenous colitis     Current Outpatient Medications   Medication Sig Dispense Refill     albuterol (PROAIR HFA) 108 (90 Base) MCG/ACT inhaler Inhale 1-2 puffs into the lungs every 6 hours as needed 2 Inhaler 11     cephALEXin (KEFLEX) 500 MG capsule Take 500 mg by mouth 4 times daily       EPINEPHrine (EPIPEN) 0.3 MG/0.3ML injection Inject 0.3 mLs (0.3 mg) into the muscle once as needed for anaphylaxis 0.6 mL 11     levothyroxine (SYNTHROID/LEVOTHROID) 88 MCG tablet Take 1 tablet (88 mcg) by mouth daily 90 tablet 3     Probiotic Product (PROBIOTIC DAILY PO)        traMADol (ULTRAM) 50 MG tablet 1 tablet every 6-8 hours as needed 90 tablet 1     traZODone (DESYREL) 100 MG tablet Take 0.5 tablets (50 mg) by mouth At Bedtime 90 tablet 3        Review of Systems   Constitutional: Negative for chills.   HENT: Negative for congestion.    Respiratory: Negative for cough.    Cardiovascular: Negative for chest pain.   Gastrointestinal: Negative for abdominal pain, constipation and hematochezia.   Genitourinary: Negative for hematuria.          OBJECTIVE:   /68 (BP Location: Right arm, Patient Position: Sitting, Cuff Size: Adult Regular)   Pulse 74   Temp 98.2  F (36.8  C) (Oral)   Resp 18   Ht 1.556 m (5' 1.25\")   Wt 68.9 kg (152 lb)   LMP 12/01/2007   SpO2 99%   BMI 28.49 kg/m    Physical Exam    Objective:  Patient alert, in no acute distress  /68 (BP Location: Right arm, Patient Position: Sitting, Cuff Size: Adult Regular)   Pulse 74   Temp 98.2  F (36.8  C) (Oral)   Resp 18   Ht 1.556 m (5' 1.25\")   Wt 68.9 kg (152 lb)   LMP 12/01/2007   " "SpO2 99%   BMI 28.49 kg/m      HEENT: extraocular movements are intact, pupils equal and reactive to light and accommodation, TMs clear, oropharynx clear  NECK: Neck supple. No adenopathy. Thyroid symmetric, normal size,, Carotids without bruits.  PULMONARY: clear to auscultation  CARDIAC: regular rate and rhythm and no murmurs, clicks, or gallops  PULSES: 2/2 throughout  BACK: no spinal or CVAT  ABDOMINAL: Soft, nontender.  Normal bowel sounds.  No hepatosplenomegaly or abnormal masses  BREAST: done by gyn  PELVIC: done by gun  REFLEXES: 2+ throughout  SKIN: unremarkable           ASSESSMENT/PLAN:   1. Encounter for routine adult health examination without abnormal findings  Advised about shingles vacine  - Basic metabolic panel  (Ca, Cl, CO2, Creat, Gluc, K, Na, BUN); Future  - Lipid panel reflex to direct LDL Fasting; Future    2. Spondylolisthesis at L5-S1 level  Refill med, follow up with surgeon  - traMADol (ULTRAM) 50 MG tablet; 1 tablet every 6-8 hours as needed  Dispense: 90 tablet; Refill: 1    3. Mild intermittent asthma without complication  stable    4. Acquired hypothyroidism  stable  - levothyroxine (SYNTHROID/LEVOTHROID) 88 MCG tablet; Take 1 tablet (88 mcg) by mouth daily  Dispense: 90 tablet; Refill: 3  - TSH with free T4 reflex; Future    5. Insomnia, unspecified type  stable  - traZODone (DESYREL) 100 MG tablet; Take 0.5 tablets (50 mg) by mouth At Bedtime  Dispense: 90 tablet; Refill: 3    6. Asymptomatic postmenopausal status    - DX Hip/Pelvis/Spine; Future    COUNSELING:  Reviewed preventive health counseling, as reflected in patient instructions    Estimated body mass index is 28.49 kg/m  as calculated from the following:    Height as of this encounter: 1.556 m (5' 1.25\").    Weight as of this encounter: 68.9 kg (152 lb).    Weight management plan: Discussed healthy diet and exercise guidelines     reports that she has never smoked. She has never used smokeless tobacco.      Counseling " Resources:  ATP IV Guidelines  Pooled Cohorts Equation Calculator  Breast Cancer Risk Calculator  FRAX Risk Assessment  ICSI Preventive Guidelines  Dietary Guidelines for Americans, 2010  USDA's MyPlate  ASA Prophylaxis  Lung CA Screening    Jessica Smith MD  [facility]

## 2019-05-24 DIAGNOSIS — Z00.00 ENCOUNTER FOR ROUTINE ADULT HEALTH EXAMINATION WITHOUT ABNORMAL FINDINGS: ICD-10-CM

## 2019-05-24 DIAGNOSIS — E03.9 ACQUIRED HYPOTHYROIDISM: ICD-10-CM

## 2019-05-24 LAB
ANION GAP SERPL CALCULATED.3IONS-SCNC: 2 MMOL/L (ref 3–14)
BUN SERPL-MCNC: 21 MG/DL (ref 7–30)
CALCIUM SERPL-MCNC: 9.6 MG/DL (ref 8.5–10.1)
CHLORIDE SERPL-SCNC: 105 MMOL/L (ref 94–109)
CHOLEST SERPL-MCNC: 205 MG/DL
CO2 SERPL-SCNC: 32 MMOL/L (ref 20–32)
CREAT SERPL-MCNC: 0.84 MG/DL (ref 0.52–1.04)
GFR SERPL CREATININE-BSD FRML MDRD: 75 ML/MIN/{1.73_M2}
GLUCOSE SERPL-MCNC: 95 MG/DL (ref 70–99)
HDLC SERPL-MCNC: 97 MG/DL
LDLC SERPL CALC-MCNC: 93 MG/DL
NONHDLC SERPL-MCNC: 108 MG/DL
POTASSIUM SERPL-SCNC: 4 MMOL/L (ref 3.4–5.3)
SODIUM SERPL-SCNC: 139 MMOL/L (ref 133–144)
TRIGL SERPL-MCNC: 73 MG/DL
TSH SERPL DL<=0.005 MIU/L-ACNC: 0.6 MU/L (ref 0.4–4)

## 2019-05-24 PROCEDURE — 80061 LIPID PANEL: CPT | Performed by: INTERNAL MEDICINE

## 2019-05-24 PROCEDURE — 36415 COLL VENOUS BLD VENIPUNCTURE: CPT | Performed by: INTERNAL MEDICINE

## 2019-05-24 PROCEDURE — 84443 ASSAY THYROID STIM HORMONE: CPT | Performed by: INTERNAL MEDICINE

## 2019-05-24 PROCEDURE — 80048 BASIC METABOLIC PNL TOTAL CA: CPT | Performed by: INTERNAL MEDICINE

## 2019-05-28 ASSESSMENT — ASTHMA QUESTIONNAIRES: ACT_TOTALSCORE: 25

## 2019-06-01 ENCOUNTER — MYC MEDICAL ADVICE (OUTPATIENT)
Dept: INTERNAL MEDICINE | Facility: CLINIC | Age: 64
End: 2019-06-01

## 2019-06-01 DIAGNOSIS — G47.00 INSOMNIA, UNSPECIFIED TYPE: ICD-10-CM

## 2019-06-04 ENCOUNTER — TRANSFERRED RECORDS (OUTPATIENT)
Dept: HEALTH INFORMATION MANAGEMENT | Facility: CLINIC | Age: 64
End: 2019-06-04

## 2019-06-04 RX ORDER — TRAZODONE HYDROCHLORIDE 100 MG/1
100 TABLET ORAL AT BEDTIME
Qty: 90 TABLET | Refills: 3 | Status: SHIPPED | OUTPATIENT
Start: 2019-06-04 | End: 2020-07-21

## 2019-06-05 ENCOUNTER — TRANSFERRED RECORDS (OUTPATIENT)
Dept: HEALTH INFORMATION MANAGEMENT | Facility: CLINIC | Age: 64
End: 2019-06-05

## 2019-06-12 ENCOUNTER — MYC MEDICAL ADVICE (OUTPATIENT)
Dept: INTERNAL MEDICINE | Facility: CLINIC | Age: 64
End: 2019-06-12

## 2019-07-03 ENCOUNTER — MYC MEDICAL ADVICE (OUTPATIENT)
Dept: INTERNAL MEDICINE | Facility: CLINIC | Age: 64
End: 2019-07-03

## 2019-07-03 DIAGNOSIS — R79.9 ABNORMAL BLOOD CHEMISTRY: Primary | ICD-10-CM

## 2019-07-09 ENCOUNTER — MYC MEDICAL ADVICE (OUTPATIENT)
Dept: INTERNAL MEDICINE | Facility: CLINIC | Age: 64
End: 2019-07-09

## 2019-07-09 NOTE — TELEPHONE ENCOUNTER
See her my chart message. Should this be sent as E-visit?     ACT Total Scores 4/3/2018 11/12/2018 5/27/2019   ACT TOTAL SCORE - - -   ASTHMA ER VISITS - - -   ASTHMA HOSPITALIZATIONS - - -   ACT TOTAL SCORE (Goal Greater than or Equal to 20) 25 25 25   In the past 12 months, how many times did you visit the emergency room for your asthma without being admitted to the hospital? 0 0 0   In the past 12 months, how many times were you hospitalized overnight because of your asthma? 0 0 0

## 2019-07-17 ENCOUNTER — TRANSFERRED RECORDS (OUTPATIENT)
Dept: HEALTH INFORMATION MANAGEMENT | Facility: CLINIC | Age: 64
End: 2019-07-17

## 2019-07-17 DIAGNOSIS — M43.17 SPONDYLOLISTHESIS AT L5-S1 LEVEL: ICD-10-CM

## 2019-07-17 RX ORDER — TRAMADOL HYDROCHLORIDE 50 MG/1
TABLET ORAL
Qty: 90 TABLET | Refills: 1 | Status: SHIPPED | OUTPATIENT
Start: 2019-07-17 | End: 2019-09-10

## 2019-07-17 NOTE — TELEPHONE ENCOUNTER
Tramadol      Last Written Prescription Date:  05/23/19  Last Fill Quantity: 90,   # refills: 1  Last Office Visit: 05/23/19*  Future Office visit:       Routing refill request to provider for review/approval because:  Drug not on the [facility], P or [facility] refill protocol or controlled substance

## 2019-07-19 ENCOUNTER — TRANSFERRED RECORDS (OUTPATIENT)
Dept: HEALTH INFORMATION MANAGEMENT | Facility: CLINIC | Age: 64
End: 2019-07-19

## 2019-08-04 ENCOUNTER — MYC MEDICAL ADVICE (OUTPATIENT)
Dept: INTERNAL MEDICINE | Facility: CLINIC | Age: 64
End: 2019-08-04

## 2019-08-05 NOTE — TELEPHONE ENCOUNTER
Per last dexa results 4-23-18, patient will not be due for another dexa until April 2021.    According to last office visit dictation 5-23-19:    Asymptomatic postmenopausal status     - DX Hip/Pelvis/Spine; Future    No future order entered into chart.    Please advise when patient should repeat dexa and place future order scan, thanks.

## 2019-08-21 ENCOUNTER — TRANSFERRED RECORDS (OUTPATIENT)
Dept: HEALTH INFORMATION MANAGEMENT | Facility: CLINIC | Age: 64
End: 2019-08-21

## 2019-08-21 DIAGNOSIS — R79.9 ABNORMAL BLOOD CHEMISTRY: ICD-10-CM

## 2019-08-21 PROCEDURE — 36415 COLL VENOUS BLD VENIPUNCTURE: CPT | Performed by: INTERNAL MEDICINE

## 2019-08-21 PROCEDURE — 84165 PROTEIN E-PHORESIS SERUM: CPT | Performed by: INTERNAL MEDICINE

## 2019-08-21 PROCEDURE — 00000402 ZZHCL STATISTIC TOTAL PROTEIN: Performed by: INTERNAL MEDICINE

## 2019-08-22 ENCOUNTER — TRANSFERRED RECORDS (OUTPATIENT)
Dept: HEALTH INFORMATION MANAGEMENT | Facility: CLINIC | Age: 64
End: 2019-08-22

## 2019-08-23 LAB
ALBUMIN SERPL ELPH-MCNC: 4 G/DL (ref 3.7–5.1)
ALPHA1 GLOB SERPL ELPH-MCNC: 0.3 G/DL (ref 0.2–0.4)
ALPHA2 GLOB SERPL ELPH-MCNC: 0.8 G/DL (ref 0.5–0.9)
B-GLOBULIN SERPL ELPH-MCNC: 0.7 G/DL (ref 0.6–1)
GAMMA GLOB SERPL ELPH-MCNC: 0.8 G/DL (ref 0.7–1.6)
M PROTEIN SERPL ELPH-MCNC: 0 G/DL
PROT PATTERN SERPL ELPH-IMP: NORMAL

## 2019-09-09 ENCOUNTER — TRANSFERRED RECORDS (OUTPATIENT)
Dept: HEALTH INFORMATION MANAGEMENT | Facility: CLINIC | Age: 64
End: 2019-09-09

## 2019-09-10 RX ORDER — ACETAMINOPHEN 500 MG
1000 TABLET ORAL 2 TIMES DAILY PRN
COMMUNITY
End: 2020-05-12

## 2019-09-10 RX ORDER — LEVOCETIRIZINE DIHYDROCHLORIDE 5 MG/1
5 TABLET, FILM COATED ORAL EVERY MORNING
COMMUNITY

## 2019-09-10 RX ORDER — MONTELUKAST SODIUM 10 MG/1
10 TABLET ORAL AT BEDTIME
COMMUNITY
End: 2021-05-24

## 2019-09-10 RX ORDER — BUDESONIDE AND FORMOTEROL FUMARATE DIHYDRATE 80; 4.5 UG/1; UG/1
2 AEROSOL RESPIRATORY (INHALATION) EVERY EVENING
COMMUNITY
End: 2019-09-13

## 2019-09-10 RX ORDER — TRAMADOL HYDROCHLORIDE 50 MG/1
50-100 TABLET ORAL 3 TIMES DAILY PRN
COMMUNITY
End: 2019-10-08

## 2019-09-10 RX ORDER — FLUTICASONE PROPIONATE 50 MCG
1 SPRAY, SUSPENSION (ML) NASAL DAILY
COMMUNITY

## 2019-09-13 ENCOUNTER — OFFICE VISIT (OUTPATIENT)
Dept: INTERNAL MEDICINE | Facility: CLINIC | Age: 64
End: 2019-09-13
Payer: COMMERCIAL

## 2019-09-13 VITALS
TEMPERATURE: 98.2 F | SYSTOLIC BLOOD PRESSURE: 129 MMHG | OXYGEN SATURATION: 97 % | BODY MASS INDEX: 28.45 KG/M2 | WEIGHT: 150.7 LBS | DIASTOLIC BLOOD PRESSURE: 74 MMHG | RESPIRATION RATE: 16 BRPM | HEIGHT: 61 IN | HEART RATE: 60 BPM

## 2019-09-13 DIAGNOSIS — Z01.818 PREOP GENERAL PHYSICAL EXAM: Primary | ICD-10-CM

## 2019-09-13 DIAGNOSIS — M43.17 SPONDYLOLISTHESIS AT L5-S1 LEVEL: ICD-10-CM

## 2019-09-13 DIAGNOSIS — M48.061 SPINAL STENOSIS OF LUMBAR REGION, UNSPECIFIED WHETHER NEUROGENIC CLAUDICATION PRESENT: ICD-10-CM

## 2019-09-13 LAB
ERYTHROCYTE [DISTWIDTH] IN BLOOD BY AUTOMATED COUNT: 13.8 % (ref 10–15)
HCT VFR BLD AUTO: 40.1 % (ref 35–47)
HGB BLD-MCNC: 12.9 G/DL (ref 11.7–15.7)
INR PPP: 1.1 (ref 0.86–1.14)
MCH RBC QN AUTO: 30 PG (ref 26.5–33)
MCHC RBC AUTO-ENTMCNC: 32.2 G/DL (ref 31.5–36.5)
MCV RBC AUTO: 93 FL (ref 78–100)
PLATELET # BLD AUTO: 273 10E9/L (ref 150–450)
RBC # BLD AUTO: 4.3 10E12/L (ref 3.8–5.2)
WBC # BLD AUTO: 4.7 10E9/L (ref 4–11)

## 2019-09-13 PROCEDURE — 87640 STAPH A DNA AMP PROBE: CPT | Mod: 59 | Performed by: INTERNAL MEDICINE

## 2019-09-13 PROCEDURE — 87641 MR-STAPH DNA AMP PROBE: CPT | Performed by: INTERNAL MEDICINE

## 2019-09-13 PROCEDURE — 85610 PROTHROMBIN TIME: CPT | Performed by: INTERNAL MEDICINE

## 2019-09-13 PROCEDURE — 99215 OFFICE O/P EST HI 40 MIN: CPT | Performed by: INTERNAL MEDICINE

## 2019-09-13 PROCEDURE — 36415 COLL VENOUS BLD VENIPUNCTURE: CPT | Performed by: INTERNAL MEDICINE

## 2019-09-13 PROCEDURE — 93000 ELECTROCARDIOGRAM COMPLETE: CPT | Performed by: INTERNAL MEDICINE

## 2019-09-13 PROCEDURE — 85027 COMPLETE CBC AUTOMATED: CPT | Performed by: INTERNAL MEDICINE

## 2019-09-13 ASSESSMENT — MIFFLIN-ST. JEOR: SCORE: 1184.91

## 2019-09-13 NOTE — NURSING NOTE
"Vital signs:  Temp: 98.2  F (36.8  C) Temp src: Oral BP: 129/74 Pulse: 60   Resp: 16 SpO2: 97 %     Height: 155.6 cm (5' 1.25\") Weight: 68.4 kg (150 lb 11.2 oz)  Estimated body mass index is 28.24 kg/m  as calculated from the following:    Height as of this encounter: 1.556 m (5' 1.25\").    Weight as of this encounter: 68.4 kg (150 lb 11.2 oz).          "

## 2019-09-13 NOTE — PROGRESS NOTES
[place] [address]  [facility] [address]  [phone]  Dept: [phone]    PRE-OP EVALUATION:  Today's date: 2019    Su Tinoco (: 1955) presents for pre-operative evaluation assessment as requested by Dr. Wilbur Howell.  She requires evaluation and anesthesia risk assessment prior to undergoing surgery/procedure for treatment of spinal stenosis, spondylolithesis .      Primary Physician: Jessica Smith  Type of Anesthesia Anticipated: to be determined    Patient has a Health Care Directive or Living Will:  YES     Preop Questions 9/10/2019   Who is doing your surgery? Dr. Wilbur Howell [facility]   What are you having done? Spinal Fusion   Date of Surgery/Procedure: 2019 @7:30 am   Facility or Hospital where procedure/surgery will be performed: [facility]   1.  Do you have a history of Heart attack, stroke, stent, coronary bypass surgery, or other heart surgery? No   2.  Do you ever have any pain or discomfort in your chest? No   3.  Do you have a history of  Heart Failure? No   4.   Are you troubled by shortness of breath when:  walking on a level surface, or up a slight hill, or at night? No   5.  Do you currently have a cold, bronchitis or other respiratory infection? No   6.  Do you have a cough, shortness of breath, or wheezing? No   7.  Do you sometimes get pains in the calves of your legs when you walk? No   8. Do you or anyone in your family have previous history of blood clots? No   9.  Do you or does anyone in your family have a serious bleeding problem such as prolonged bleeding following surgeries or cuts? No   10. Have you ever had problems with anemia or been told to take iron pills? No   11. Have you had any abnormal blood loss such as black, tarry or bloody stools, or abnormal vaginal bleeding? No   12. Have you ever had a blood transfusion? No   13. Have you or any of your relatives ever had problems with anesthesia? No   14. Do you have  sleep apnea, excessive snoring or daytime drowsiness? No   15. Do you have any prosthetic heart valves? No   16. Do you have prosthetic joints? No   17. Is there any chance that you may be pregnant? No         HPI:     HPI related to upcoming procedure: She has spondylolisthesis and spinal stenosis causing ongoing back and leg symptoms, not managed by conservative treatment so will undergo surgical fusion.      ASTHMA - Patient has a longstanding history of mild Asthma . Patient has been doing well overall noting NO SYMPTOMS and continues on medication regimen consisting of inhaler prn without adverse reactions or side effects.       MEDICAL HISTORY:     Patient Active Problem List    Diagnosis Date Noted     Collagenous colitis 12/05/2018     Priority: Medium     Controlled substance agreement signed 04/25/2016     Priority: Medium     Spinal stenosis of lumbar region      Priority: Medium     L5, S1       Spondylolisthesis at L5-S1 level      Priority: Medium     BRONWYN (generalized anxiety disorder) 11/09/2014     Priority: Medium     CARDIOVASCULAR SCREENING; LDL GOAL LESS THAN 160 10/31/2010     Priority: Medium     Mild intermittent asthma      Priority: Medium     Migraine 01/27/2005     Priority: Medium     Problem list name updated by automated process. Provider to review       Allergic rhinitis 07/21/2003     Priority: Medium     Problem list name updated by automated process. Provider to review       Hypothyroidism 07/21/2003     Priority: Medium     Problem list name updated by automated process. 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      Colitis 11/2018     Mild intermittent asthma     sees allergist     Other specified disorders of thyroid      Spinal stenosis of lumbar region     L5, S1     Spondylolisthesis at L5-S1 level      Sprain of unspecified site of back      Trochanteric  bursitis of right hip      Unspecified hypothyroidism      Past Surgical History:   Procedure Laterality Date     C NONSPECIFIC PROCEDURE      Laparoscopies IUD infection     C NONSPECIFIC PROCEDURE      removal of myxoid tumors fingers     C NONSPECIFIC PROCEDURE      sphincterotomy of anus     C/SECTION, LOW TRANSVERSE      , Low Transverse     TONSILLECTOMY & ADENOIDECTOMY       TUBAL LIGATION       Current Outpatient Medications   Medication Sig Dispense Refill     acetaminophen (TYLENOL) 500 MG tablet Take 500-1,000 mg by mouth 2 times daily as needed for mild pain       albuterol (PROAIR HFA) 108 (90 Base) MCG/ACT inhaler Inhale 1-2 puffs into the lungs every 6 hours as needed 2 Inhaler 11     Biotin w/ Vitamins C & E (HAIR/SKIN/NAILS PO) Take 1 tablet by mouth daily       budesonide-formoterol (SYMBICORT) 80-4.5 MCG/ACT Inhaler Inhale 2 puffs into the lungs every evening -received a sample from MD's office-       CALCIUM PO Take 1 tablet by mouth daily       EPINEPHrine (EPIPEN) 0.3 MG/0.3ML injection Inject 0.3 mLs (0.3 mg) into the muscle once as needed for anaphylaxis 0.6 mL 11     FIBER PO Take 3 tablets by mouth daily (with lunch)       fluticasone (FLONASE) 50 MCG/ACT nasal spray Spray 1 spray into both nostrils daily       levocetirizine (XYZAL) 5 MG tablet Take 5 mg by mouth every morning       levothyroxine (SYNTHROID/LEVOTHROID) 88 MCG tablet Take 1 tablet (88 mcg) by mouth daily (Patient taking differently: Take 88 mcg by mouth daily GABBI: Brand Name Synthroid) 90 tablet 3     melatonin 5 MG tablet Take 10 mg by mouth At Bedtime       montelukast (SINGULAIR) 10 MG tablet Take 10 mg by mouth At Bedtime       Multiple Vitamins-Minerals (WOMENS MULTI PO) Take 1 tablet by mouth daily       Probiotic Product (PROBIOTIC DAILY PO) Take 1 tablet by mouth daily        traMADol (ULTRAM) 50 MG tablet Take  mg by mouth 4 times daily as needed for severe pain       traZODone (DESYREL) 100 MG  "tablet Take 1 tablet (100 mg) by mouth At Bedtime 90 tablet 3     OTC products: None, except as noted above    Allergies   Allergen Reactions     Nuts Anaphylaxis     peanuts, nara nuts     Compazine      Lock jaw, vision problems and shakes     Erythromycin GI Disturbance     Prochlorperazine      Jaw spasm and vision loss      Latex Allergy: NO    Social History     Tobacco Use     Smoking status: Never Smoker     Smokeless tobacco: Never Used   Substance Use Topics     Alcohol use: Yes     Comment: rarely- once a month     History   Drug Use No       REVIEW OF SYSTEMS:   GENERAL: negative for, fever, chills, weight loss, weight gain  EYES: negative  ENT: negative  RESPIRATORY: No dyspnea on exertion and No cough  CARDIOVASCULAR: negative for, palpitations, tachycardia, irregular heart beat and chest pain  GI: negative for, nausea, vomiting, abdominal pain, melena and hematochezia  : negative for, dysuria and hematuria  MUSCULOSKELETAL: back pain  NEUROLOGIC: negative for, headaches, seizures, local weakness and numbness or tingling of hands  SKIN: negative  ENDOCRINE: negative       EXAM:   LMP 12/01/2007     Patient is alert, oriented, cooperative in no acute distress.  /74   Pulse 60   Temp 98.2  F (36.8  C) (Oral)   Resp 16   Ht 1.556 m (5' 1.25\")   Wt 68.4 kg (150 lb 11.2 oz)   LMP 12/01/2007   SpO2 97%   BMI 28.24 kg/m      HEENT: PERRL, EOMI, TM's are normal. Oropharynx is clear.  NECK: No lymphadenopathy or thyromegaly. Carotid pulses full without bruits.  LUNGS: clear  CV: normal S1, S2 without murmur, S3 or S4 present. Pulses are 2/2 throughout. No JVD.  ABDOMEN: Bowel sounds present, nontender without hepatosplenomegaly. Liver is normal size to percussion.  EXTREMITIES: no edema present, unremarkable joints  NEUROLOGIC: Cranial nerves II-XII intact, reflexes 2/4 throughout, strength 5/5, sensation grossly intact, gait normal.  SKIN: without rashes or significant lesions     DIAGNOSTICS: "   EKG: appears normal, NSR, normal axis, normal intervals, no acute ST/T changes c/w ischemia, no LVH by voltage criteria  Lab: see [facility]    Recent Labs   Lab Test 05/24/19  0846 02/20/19  1619 04/03/18  0909  06/22/12  0920   HGB  --  13.8  --   --  13.3   PLT  --  320  --   --   --      --  138   < >  --    POTASSIUM 4.0  --  4.1   < >  --    CR 0.84  --  0.87   < >  --     < > = values in this interval not displayed.        IMPRESSION:   Reason for surgery/procedure: Spinal stenosis and spondylolisthesis  Diagnosis/reason for consult: preop    The proposed surgical procedure is considered INTERMEDIATE risk.    REVISED CARDIAC RISK INDEX  The patient has the following serious cardiovascular risks for perioperative complications such as (MI, PE, VFib and 3  AV Block):  No serious cardiac risks  INTERPRETATION: 0 risks: Class I (very low risk - 0.4% complication rate)    The patient has the following additional risks for perioperative complications:  No identified additional risks      ICD-10-CM    1. Preop general physical exam Z01.818 EKG 12-lead complete w/read - Clinics     CBC with platelets     INR     Methicillin Resist/Sens S. aureus PCR     CANCELED: MRSA MSSA Presurgical Screen   2. Spinal stenosis of lumbar region, unspecified whether neurogenic claudication present M48.061    3. Spondylolisthesis at L5-S1 level M43.17        RECOMMENDATIONS:       She will not take any medication the morning of surgery except can use her inhaler if needed.   Recommend albuterol neb treatment as needed intra- or postoperatively for hypoxia or wheezing.     APPROVAL GIVEN to proceed with proposed procedure, without further diagnostic evaluation       Signed Electronically by: Jessica Smith MD    Copy of this evaluation report is provided to requesting physician.    [facility] Preop Guidelines    Revised Cardiac Risk Index

## 2019-09-14 LAB
MRSA DNA SPEC QL NAA+PROBE: NEGATIVE
SPECIMEN SOURCE: NORMAL

## 2019-09-26 ENCOUNTER — APPOINTMENT (OUTPATIENT)
Dept: GENERAL RADIOLOGY | Facility: CLINIC | Age: 64
DRG: 460 | End: 2019-09-26
Attending: ORTHOPAEDIC SURGERY
Payer: COMMERCIAL

## 2019-09-26 ENCOUNTER — ANESTHESIA (OUTPATIENT)
Dept: SURGERY | Facility: CLINIC | Age: 64
DRG: 460 | End: 2019-09-26
Payer: COMMERCIAL

## 2019-09-26 ENCOUNTER — ANESTHESIA EVENT (OUTPATIENT)
Dept: SURGERY | Facility: CLINIC | Age: 64
DRG: 460 | End: 2019-09-26
Payer: COMMERCIAL

## 2019-09-26 ENCOUNTER — HOSPITAL ENCOUNTER (INPATIENT)
Facility: CLINIC | Age: 64
LOS: 3 days | Discharge: HOME | DRG: 460 | End: 2019-09-29
Attending: ORTHOPAEDIC SURGERY | Admitting: ORTHOPAEDIC SURGERY
Payer: COMMERCIAL

## 2019-09-26 DIAGNOSIS — Z98.1 STATUS POST LUMBAR SPINAL FUSION: Primary | ICD-10-CM

## 2019-09-26 LAB
CREAT SERPL-MCNC: 0.7 MG/DL (ref 0.52–1.04)
GFR SERPL CREATININE-BSD FRML MDRD: >90 ML/MIN/{1.73_M2}

## 2019-09-26 PROCEDURE — 25000125 ZZHC RX 250: Performed by: ORTHOPAEDIC SURGERY

## 2019-09-26 PROCEDURE — 25800030 ZZH RX IP 258 OP 636: Performed by: NURSE ANESTHETIST, CERTIFIED REGISTERED

## 2019-09-26 PROCEDURE — C1762 CONN TISS, HUMAN(INC FASCIA): HCPCS | Performed by: ORTHOPAEDIC SURGERY

## 2019-09-26 PROCEDURE — 36000069 ZZH SURGERY LEVEL 5 EA 15 ADDTL MIN: Performed by: ORTHOPAEDIC SURGERY

## 2019-09-26 PROCEDURE — 40000278 XR SURGERY CARM FLUORO LESS THAN 5 MIN

## 2019-09-26 PROCEDURE — 01NB0ZZ RELEASE LUMBAR NERVE, OPEN APPROACH: ICD-10-PCS | Performed by: ORTHOPAEDIC SURGERY

## 2019-09-26 PROCEDURE — L8699 PROSTHETIC IMPLANT NOS: HCPCS | Performed by: ORTHOPAEDIC SURGERY

## 2019-09-26 PROCEDURE — 25800030 ZZH RX IP 258 OP 636: Performed by: ANESTHESIOLOGY

## 2019-09-26 PROCEDURE — 25000125 ZZHC RX 250: Performed by: NURSE ANESTHETIST, CERTIFIED REGISTERED

## 2019-09-26 PROCEDURE — 37000009 ZZH ANESTHESIA TECHNICAL FEE, EACH ADDTL 15 MIN: Performed by: ORTHOPAEDIC SURGERY

## 2019-09-26 PROCEDURE — 36415 COLL VENOUS BLD VENIPUNCTURE: CPT | Performed by: ORTHOPAEDIC SURGERY

## 2019-09-26 PROCEDURE — 36000071 ZZH SURGERY LEVEL 5 W FLUORO 1ST 30 MIN: Performed by: ORTHOPAEDIC SURGERY

## 2019-09-26 PROCEDURE — 40000170 ZZH STATISTIC PRE-PROCEDURE ASSESSMENT II: Performed by: ORTHOPAEDIC SURGERY

## 2019-09-26 PROCEDURE — 37000008 ZZH ANESTHESIA TECHNICAL FEE, 1ST 30 MIN: Performed by: ORTHOPAEDIC SURGERY

## 2019-09-26 PROCEDURE — 25000128 H RX IP 250 OP 636: Performed by: NURSE ANESTHETIST, CERTIFIED REGISTERED

## 2019-09-26 PROCEDURE — 25000132 ZZH RX MED GY IP 250 OP 250 PS 637: Performed by: ORTHOPAEDIC SURGERY

## 2019-09-26 PROCEDURE — 27210794 ZZH OR GENERAL SUPPLY STERILE: Performed by: ORTHOPAEDIC SURGERY

## 2019-09-26 PROCEDURE — 25000128 H RX IP 250 OP 636: Performed by: ORTHOPAEDIC SURGERY

## 2019-09-26 PROCEDURE — 82565 ASSAY OF CREATININE: CPT | Performed by: ORTHOPAEDIC SURGERY

## 2019-09-26 PROCEDURE — 71000013 ZZH RECOVERY PHASE 1 LEVEL 1 EA ADDTL HR: Performed by: ORTHOPAEDIC SURGERY

## 2019-09-26 PROCEDURE — 25000566 ZZH SEVOFLURANE, EA 15 MIN: Performed by: ORTHOPAEDIC SURGERY

## 2019-09-26 PROCEDURE — 12000000 ZZH R&B MED SURG/OB

## 2019-09-26 PROCEDURE — 25000128 H RX IP 250 OP 636: Performed by: ANESTHESIOLOGY

## 2019-09-26 PROCEDURE — 25000301 ZZH OR RX SURGIFLO W/THROMBIN KIT 2ML 1991 OPNP: Performed by: ORTHOPAEDIC SURGERY

## 2019-09-26 PROCEDURE — 25000132 ZZH RX MED GY IP 250 OP 250 PS 637: Performed by: PHYSICIAN ASSISTANT

## 2019-09-26 PROCEDURE — 71000012 ZZH RECOVERY PHASE 1 LEVEL 1 FIRST HR: Performed by: ORTHOPAEDIC SURGERY

## 2019-09-26 PROCEDURE — 0SG3071 FUSION OF LUMBOSACRAL JOINT WITH AUTOLOGOUS TISSUE SUBSTITUTE, POSTERIOR APPROACH, POSTERIOR COLUMN, OPEN APPROACH: ICD-10-PCS | Performed by: ORTHOPAEDIC SURGERY

## 2019-09-26 PROCEDURE — C1713 ANCHOR/SCREW BN/BN,TIS/BN: HCPCS | Performed by: ORTHOPAEDIC SURGERY

## 2019-09-26 DEVICE — GRAFT BONE PUTTY I-FACTOR PEPTIDE ENHANCED 5ML 700-050: Type: IMPLANTABLE DEVICE | Site: SPINE LUMBAR | Status: FUNCTIONAL

## 2019-09-26 DEVICE — GRAFT BONE CRUSH CANC 30ML 400080: Type: IMPLANTABLE DEVICE | Site: SPINE LUMBAR | Status: FUNCTIONAL

## 2019-09-26 DEVICE — IMPLANTABLE DEVICE: Type: IMPLANTABLE DEVICE | Site: SPINE LUMBAR | Status: FUNCTIONAL

## 2019-09-26 DEVICE — GRAFT BONE PUTTY I-FACTOR PEPTIDE ENHANCED 1ML 700-010: Type: IMPLANTABLE DEVICE | Site: SPINE LUMBAR | Status: FUNCTIONAL

## 2019-09-26 RX ORDER — SODIUM CHLORIDE AND POTASSIUM CHLORIDE 150; 900 MG/100ML; MG/100ML
INJECTION, SOLUTION INTRAVENOUS CONTINUOUS
Status: DISCONTINUED | OUTPATIENT
Start: 2019-09-26 | End: 2019-09-29 | Stop reason: HOSPADM

## 2019-09-26 RX ORDER — GLYCOPYRROLATE 0.2 MG/ML
INJECTION, SOLUTION INTRAMUSCULAR; INTRAVENOUS PRN
Status: DISCONTINUED | OUTPATIENT
Start: 2019-09-26 | End: 2019-09-26

## 2019-09-26 RX ORDER — CELECOXIB 200 MG/1
400 CAPSULE ORAL ONCE
Status: COMPLETED | OUTPATIENT
Start: 2019-09-26 | End: 2019-09-26

## 2019-09-26 RX ORDER — LEVOTHYROXINE SODIUM 88 UG/1
88 TABLET ORAL DAILY
Status: DISCONTINUED | OUTPATIENT
Start: 2019-09-27 | End: 2019-09-29 | Stop reason: HOSPADM

## 2019-09-26 RX ORDER — OXYCODONE HCL 10 MG/1
10 TABLET, FILM COATED, EXTENDED RELEASE ORAL ONCE
Status: COMPLETED | OUTPATIENT
Start: 2019-09-26 | End: 2019-09-26

## 2019-09-26 RX ORDER — ACETAMINOPHEN 325 MG/1
975 TABLET ORAL EVERY 8 HOURS
Status: DISPENSED | OUTPATIENT
Start: 2019-09-26 | End: 2019-09-29

## 2019-09-26 RX ORDER — BUDESONIDE AND FORMOTEROL FUMARATE DIHYDRATE 80; 4.5 UG/1; UG/1
2 AEROSOL RESPIRATORY (INHALATION) EVERY EVENING
Status: DISCONTINUED | OUTPATIENT
Start: 2019-09-26 | End: 2019-09-29 | Stop reason: HOSPADM

## 2019-09-26 RX ORDER — CEFAZOLIN SODIUM 2 G/100ML
2 INJECTION, SOLUTION INTRAVENOUS
Status: COMPLETED | OUTPATIENT
Start: 2019-09-26 | End: 2019-09-26

## 2019-09-26 RX ORDER — EPHEDRINE SULFATE 50 MG/ML
INJECTION, SOLUTION INTRAMUSCULAR; INTRAVENOUS; SUBCUTANEOUS PRN
Status: DISCONTINUED | OUTPATIENT
Start: 2019-09-26 | End: 2019-09-26

## 2019-09-26 RX ORDER — FLUTICASONE PROPIONATE 50 MCG
1 SPRAY, SUSPENSION (ML) NASAL DAILY
Status: DISCONTINUED | OUTPATIENT
Start: 2019-09-26 | End: 2019-09-29 | Stop reason: HOSPADM

## 2019-09-26 RX ORDER — OXYCODONE HYDROCHLORIDE 5 MG/1
5-10 TABLET ORAL
Status: DISCONTINUED | OUTPATIENT
Start: 2019-09-26 | End: 2019-09-29 | Stop reason: HOSPADM

## 2019-09-26 RX ORDER — LIDOCAINE HYDROCHLORIDE 20 MG/ML
INJECTION, SOLUTION INFILTRATION; PERINEURAL PRN
Status: DISCONTINUED | OUTPATIENT
Start: 2019-09-26 | End: 2019-09-26

## 2019-09-26 RX ORDER — HYDRALAZINE HYDROCHLORIDE 20 MG/ML
2.5-5 INJECTION INTRAMUSCULAR; INTRAVENOUS EVERY 10 MIN PRN
Status: DISCONTINUED | OUTPATIENT
Start: 2019-09-26 | End: 2019-09-26 | Stop reason: HOSPADM

## 2019-09-26 RX ORDER — ONDANSETRON 2 MG/ML
4 INJECTION INTRAMUSCULAR; INTRAVENOUS EVERY 30 MIN PRN
Status: DISCONTINUED | OUTPATIENT
Start: 2019-09-26 | End: 2019-09-26 | Stop reason: HOSPADM

## 2019-09-26 RX ORDER — HYDROMORPHONE HYDROCHLORIDE 1 MG/ML
.3-.5 INJECTION, SOLUTION INTRAMUSCULAR; INTRAVENOUS; SUBCUTANEOUS EVERY 5 MIN PRN
Status: DISCONTINUED | OUTPATIENT
Start: 2019-09-26 | End: 2019-09-26 | Stop reason: HOSPADM

## 2019-09-26 RX ORDER — NEOSTIGMINE METHYLSULFATE 1 MG/ML
VIAL (ML) INJECTION PRN
Status: DISCONTINUED | OUTPATIENT
Start: 2019-09-26 | End: 2019-09-26

## 2019-09-26 RX ORDER — METOCLOPRAMIDE HYDROCHLORIDE 5 MG/ML
10 INJECTION INTRAMUSCULAR; INTRAVENOUS EVERY 6 HOURS PRN
Status: DISCONTINUED | OUTPATIENT
Start: 2019-09-26 | End: 2019-09-29 | Stop reason: HOSPADM

## 2019-09-26 RX ORDER — GABAPENTIN 300 MG/1
300 CAPSULE ORAL
Status: COMPLETED | OUTPATIENT
Start: 2019-09-26 | End: 2019-09-26

## 2019-09-26 RX ORDER — KETOROLAC TROMETHAMINE 30 MG/ML
15 INJECTION, SOLUTION INTRAMUSCULAR; INTRAVENOUS EVERY 6 HOURS
Status: COMPLETED | OUTPATIENT
Start: 2019-09-26 | End: 2019-09-26

## 2019-09-26 RX ORDER — ONDANSETRON 2 MG/ML
INJECTION INTRAMUSCULAR; INTRAVENOUS PRN
Status: DISCONTINUED | OUTPATIENT
Start: 2019-09-26 | End: 2019-09-26

## 2019-09-26 RX ORDER — AMOXICILLIN 250 MG
1 CAPSULE ORAL 2 TIMES DAILY
Status: DISCONTINUED | OUTPATIENT
Start: 2019-09-26 | End: 2019-09-29 | Stop reason: HOSPADM

## 2019-09-26 RX ORDER — HYDROCODONE BITARTRATE AND ACETAMINOPHEN 5; 325 MG/1; MG/1
1-2 TABLET ORAL EVERY 4 HOURS PRN
Status: DISCONTINUED | OUTPATIENT
Start: 2019-09-26 | End: 2019-09-29 | Stop reason: HOSPADM

## 2019-09-26 RX ORDER — DIPHENHYDRAMINE HCL 25 MG
25 CAPSULE ORAL EVERY 6 HOURS PRN
Status: DISCONTINUED | OUTPATIENT
Start: 2019-09-26 | End: 2019-09-29 | Stop reason: HOSPADM

## 2019-09-26 RX ORDER — VANCOMYCIN HYDROCHLORIDE 1 G/20ML
INJECTION, POWDER, LYOPHILIZED, FOR SOLUTION INTRAVENOUS PRN
Status: DISCONTINUED | OUTPATIENT
Start: 2019-09-26 | End: 2019-09-26 | Stop reason: HOSPADM

## 2019-09-26 RX ORDER — ONDANSETRON 4 MG/1
4 TABLET, ORALLY DISINTEGRATING ORAL EVERY 6 HOURS PRN
Status: DISCONTINUED | OUTPATIENT
Start: 2019-09-26 | End: 2019-09-29 | Stop reason: HOSPADM

## 2019-09-26 RX ORDER — METOCLOPRAMIDE 10 MG/1
10 TABLET ORAL EVERY 6 HOURS PRN
Status: DISCONTINUED | OUTPATIENT
Start: 2019-09-26 | End: 2019-09-29 | Stop reason: HOSPADM

## 2019-09-26 RX ORDER — LABETALOL HYDROCHLORIDE 5 MG/ML
10 INJECTION, SOLUTION INTRAVENOUS
Status: DISCONTINUED | OUTPATIENT
Start: 2019-09-26 | End: 2019-09-26 | Stop reason: HOSPADM

## 2019-09-26 RX ORDER — ACETAMINOPHEN 325 MG/1
975 TABLET ORAL ONCE
Status: COMPLETED | OUTPATIENT
Start: 2019-09-26 | End: 2019-09-26

## 2019-09-26 RX ORDER — NALOXONE HYDROCHLORIDE 0.4 MG/ML
.1-.4 INJECTION, SOLUTION INTRAMUSCULAR; INTRAVENOUS; SUBCUTANEOUS
Status: DISCONTINUED | OUTPATIENT
Start: 2019-09-26 | End: 2019-09-29 | Stop reason: HOSPADM

## 2019-09-26 RX ORDER — MEPERIDINE HYDROCHLORIDE 25 MG/ML
12.5 INJECTION INTRAMUSCULAR; INTRAVENOUS; SUBCUTANEOUS EVERY 5 MIN PRN
Status: DISCONTINUED | OUTPATIENT
Start: 2019-09-26 | End: 2019-09-26 | Stop reason: HOSPADM

## 2019-09-26 RX ORDER — LORATADINE 10 MG/1
10 TABLET ORAL EVERY MORNING
Status: DISCONTINUED | OUTPATIENT
Start: 2019-09-27 | End: 2019-09-29 | Stop reason: HOSPADM

## 2019-09-26 RX ORDER — GABAPENTIN 300 MG/1
300 CAPSULE ORAL 2 TIMES DAILY
Status: COMPLETED | OUTPATIENT
Start: 2019-09-26 | End: 2019-09-28

## 2019-09-26 RX ORDER — ONDANSETRON 2 MG/ML
4 INJECTION INTRAMUSCULAR; INTRAVENOUS EVERY 6 HOURS PRN
Status: DISCONTINUED | OUTPATIENT
Start: 2019-09-26 | End: 2019-09-29 | Stop reason: HOSPADM

## 2019-09-26 RX ORDER — MONTELUKAST SODIUM 10 MG/1
10 TABLET ORAL AT BEDTIME
Status: DISCONTINUED | OUTPATIENT
Start: 2019-09-26 | End: 2019-09-29 | Stop reason: HOSPADM

## 2019-09-26 RX ORDER — BUDESONIDE AND FORMOTEROL FUMARATE DIHYDRATE 80; 4.5 UG/1; UG/1
2 AEROSOL RESPIRATORY (INHALATION) EVERY EVENING
COMMUNITY
End: 2020-05-12

## 2019-09-26 RX ORDER — CYCLOBENZAPRINE HCL 10 MG
10 TABLET ORAL 3 TIMES DAILY PRN
Status: DISCONTINUED | OUTPATIENT
Start: 2019-09-26 | End: 2019-09-29 | Stop reason: HOSPADM

## 2019-09-26 RX ORDER — CEFAZOLIN SODIUM 1 G/3ML
1 INJECTION, POWDER, FOR SOLUTION INTRAMUSCULAR; INTRAVENOUS EVERY 8 HOURS
Status: COMPLETED | OUTPATIENT
Start: 2019-09-26 | End: 2019-09-27

## 2019-09-26 RX ORDER — HYDROMORPHONE HYDROCHLORIDE 1 MG/ML
.3-.5 INJECTION, SOLUTION INTRAMUSCULAR; INTRAVENOUS; SUBCUTANEOUS
Status: DISCONTINUED | OUTPATIENT
Start: 2019-09-26 | End: 2019-09-29 | Stop reason: HOSPADM

## 2019-09-26 RX ORDER — NALOXONE HYDROCHLORIDE 0.4 MG/ML
.1-.4 INJECTION, SOLUTION INTRAMUSCULAR; INTRAVENOUS; SUBCUTANEOUS
Status: DISCONTINUED | OUTPATIENT
Start: 2019-09-26 | End: 2019-09-26

## 2019-09-26 RX ORDER — FENTANYL CITRATE 50 UG/ML
25-50 INJECTION, SOLUTION INTRAMUSCULAR; INTRAVENOUS
Status: DISCONTINUED | OUTPATIENT
Start: 2019-09-26 | End: 2019-09-26 | Stop reason: HOSPADM

## 2019-09-26 RX ORDER — ACETAMINOPHEN 325 MG/1
650 TABLET ORAL EVERY 4 HOURS PRN
Status: DISCONTINUED | OUTPATIENT
Start: 2019-09-29 | End: 2019-09-29 | Stop reason: HOSPADM

## 2019-09-26 RX ORDER — DIPHENHYDRAMINE HYDROCHLORIDE 50 MG/ML
25 INJECTION INTRAMUSCULAR; INTRAVENOUS EVERY 6 HOURS PRN
Status: DISCONTINUED | OUTPATIENT
Start: 2019-09-26 | End: 2019-09-29 | Stop reason: HOSPADM

## 2019-09-26 RX ORDER — SODIUM CHLORIDE, SODIUM LACTATE, POTASSIUM CHLORIDE, CALCIUM CHLORIDE 600; 310; 30; 20 MG/100ML; MG/100ML; MG/100ML; MG/100ML
INJECTION, SOLUTION INTRAVENOUS CONTINUOUS
Status: DISCONTINUED | OUTPATIENT
Start: 2019-09-26 | End: 2019-09-26 | Stop reason: HOSPADM

## 2019-09-26 RX ORDER — PROPOFOL 10 MG/ML
INJECTION, EMULSION INTRAVENOUS PRN
Status: DISCONTINUED | OUTPATIENT
Start: 2019-09-26 | End: 2019-09-26

## 2019-09-26 RX ORDER — FENTANYL CITRATE 50 UG/ML
INJECTION, SOLUTION INTRAMUSCULAR; INTRAVENOUS PRN
Status: DISCONTINUED | OUTPATIENT
Start: 2019-09-26 | End: 2019-09-26

## 2019-09-26 RX ORDER — LIDOCAINE 40 MG/G
CREAM TOPICAL
Status: DISCONTINUED | OUTPATIENT
Start: 2019-09-26 | End: 2019-09-29 | Stop reason: HOSPADM

## 2019-09-26 RX ORDER — ALBUTEROL SULFATE 90 UG/1
1-2 AEROSOL, METERED RESPIRATORY (INHALATION) EVERY 6 HOURS PRN
Status: DISCONTINUED | OUTPATIENT
Start: 2019-09-26 | End: 2019-09-29 | Stop reason: HOSPADM

## 2019-09-26 RX ORDER — ONDANSETRON 4 MG/1
4 TABLET, ORALLY DISINTEGRATING ORAL EVERY 30 MIN PRN
Status: DISCONTINUED | OUTPATIENT
Start: 2019-09-26 | End: 2019-09-26 | Stop reason: HOSPADM

## 2019-09-26 RX ORDER — OMEGA-3/DHA/EPA/FISH OIL 300-1000MG
1 CAPSULE ORAL DAILY
COMMUNITY

## 2019-09-26 RX ORDER — AMOXICILLIN 250 MG
2 CAPSULE ORAL 2 TIMES DAILY
Status: DISCONTINUED | OUTPATIENT
Start: 2019-09-26 | End: 2019-09-29 | Stop reason: HOSPADM

## 2019-09-26 RX ORDER — ALBUTEROL SULFATE 0.83 MG/ML
2.5 SOLUTION RESPIRATORY (INHALATION) EVERY 4 HOURS PRN
Status: DISCONTINUED | OUTPATIENT
Start: 2019-09-26 | End: 2019-09-26 | Stop reason: HOSPADM

## 2019-09-26 RX ORDER — TRAZODONE HYDROCHLORIDE 100 MG/1
100 TABLET ORAL AT BEDTIME
Status: DISCONTINUED | OUTPATIENT
Start: 2019-09-26 | End: 2019-09-29 | Stop reason: HOSPADM

## 2019-09-26 RX ORDER — DIPHENHYDRAMINE HYDROCHLORIDE 50 MG/ML
INJECTION INTRAMUSCULAR; INTRAVENOUS PRN
Status: DISCONTINUED | OUTPATIENT
Start: 2019-09-26 | End: 2019-09-26

## 2019-09-26 RX ORDER — CEFAZOLIN SODIUM 1 G/3ML
1 INJECTION, POWDER, FOR SOLUTION INTRAMUSCULAR; INTRAVENOUS SEE ADMIN INSTRUCTIONS
Status: DISCONTINUED | OUTPATIENT
Start: 2019-09-26 | End: 2019-09-26 | Stop reason: HOSPADM

## 2019-09-26 RX ADMIN — KETOROLAC TROMETHAMINE 15 MG: 30 INJECTION, SOLUTION INTRAMUSCULAR at 14:09

## 2019-09-26 RX ADMIN — OXYCODONE HYDROCHLORIDE 10 MG: 10 TABLET, FILM COATED, EXTENDED RELEASE ORAL at 06:55

## 2019-09-26 RX ADMIN — HYDROMORPHONE HYDROCHLORIDE 0.5 MG: 1 INJECTION, SOLUTION INTRAMUSCULAR; INTRAVENOUS; SUBCUTANEOUS at 12:11

## 2019-09-26 RX ADMIN — KETOROLAC TROMETHAMINE 15 MG: 30 INJECTION, SOLUTION INTRAMUSCULAR at 21:09

## 2019-09-26 RX ADMIN — GLYCOPYRROLATE 0.2 MG: 0.2 INJECTION, SOLUTION INTRAMUSCULAR; INTRAVENOUS at 10:01

## 2019-09-26 RX ADMIN — ROCURONIUM BROMIDE 10 MG: 10 INJECTION INTRAVENOUS at 08:23

## 2019-09-26 RX ADMIN — HYDROMORPHONE HYDROCHLORIDE 0.5 MG: 1 INJECTION, SOLUTION INTRAMUSCULAR; INTRAVENOUS; SUBCUTANEOUS at 13:53

## 2019-09-26 RX ADMIN — SENNOSIDES AND DOCUSATE SODIUM 2 TABLET: 8.6; 5 TABLET ORAL at 21:10

## 2019-09-26 RX ADMIN — HYDROMORPHONE HYDROCHLORIDE 0.5 MG: 1 INJECTION, SOLUTION INTRAMUSCULAR; INTRAVENOUS; SUBCUTANEOUS at 15:48

## 2019-09-26 RX ADMIN — ROCURONIUM BROMIDE 10 MG: 10 INJECTION INTRAVENOUS at 09:23

## 2019-09-26 RX ADMIN — TRAZODONE HYDROCHLORIDE 100 MG: 100 TABLET ORAL at 21:11

## 2019-09-26 RX ADMIN — FENTANYL CITRATE 100 MCG: 50 INJECTION, SOLUTION INTRAMUSCULAR; INTRAVENOUS at 07:52

## 2019-09-26 RX ADMIN — HYDROMORPHONE HYDROCHLORIDE 0.5 MG: 1 INJECTION, SOLUTION INTRAMUSCULAR; INTRAVENOUS; SUBCUTANEOUS at 19:18

## 2019-09-26 RX ADMIN — GLYCOPYRROLATE 0.6 MG: 0.2 INJECTION, SOLUTION INTRAMUSCULAR; INTRAVENOUS at 10:55

## 2019-09-26 RX ADMIN — GABAPENTIN 300 MG: 300 CAPSULE ORAL at 15:41

## 2019-09-26 RX ADMIN — HYDROMORPHONE HYDROCHLORIDE 0.5 MG: 1 INJECTION, SOLUTION INTRAMUSCULAR; INTRAVENOUS; SUBCUTANEOUS at 08:24

## 2019-09-26 RX ADMIN — SODIUM CHLORIDE, POTASSIUM CHLORIDE, SODIUM LACTATE AND CALCIUM CHLORIDE: 600; 310; 30; 20 INJECTION, SOLUTION INTRAVENOUS at 06:55

## 2019-09-26 RX ADMIN — POTASSIUM CHLORIDE AND SODIUM CHLORIDE: 900; 150 INJECTION, SOLUTION INTRAVENOUS at 14:59

## 2019-09-26 RX ADMIN — CELECOXIB 400 MG: 200 CAPSULE ORAL at 06:55

## 2019-09-26 RX ADMIN — PHENYLEPHRINE HYDROCHLORIDE 0.3 MCG/KG/MIN: 10 INJECTION INTRAVENOUS at 08:16

## 2019-09-26 RX ADMIN — MELATONIN TAB 3 MG 10 MG: 3 TAB at 21:39

## 2019-09-26 RX ADMIN — ROCURONIUM BROMIDE 10 MG: 10 INJECTION INTRAVENOUS at 10:24

## 2019-09-26 RX ADMIN — BUDESONIDE AND FORMOTEROL FUMARATE DIHYDRATE 2 PUFF: 80; 4.5 AEROSOL RESPIRATORY (INHALATION) at 21:09

## 2019-09-26 RX ADMIN — MONTELUKAST 10 MG: 10 TABLET, FILM COATED ORAL at 21:11

## 2019-09-26 RX ADMIN — NEOSTIGMINE METHYLSULFATE 4 MG: 1 INJECTION, SOLUTION INTRAVENOUS at 10:55

## 2019-09-26 RX ADMIN — PHENYLEPHRINE HYDROCHLORIDE 100 MCG: 10 INJECTION INTRAVENOUS at 09:12

## 2019-09-26 RX ADMIN — ROCURONIUM BROMIDE 10 MG: 10 INJECTION INTRAVENOUS at 09:42

## 2019-09-26 RX ADMIN — LIDOCAINE HYDROCHLORIDE 60 MG: 20 INJECTION, SOLUTION INFILTRATION; PERINEURAL at 07:52

## 2019-09-26 RX ADMIN — ACETAMINOPHEN 975 MG: 325 TABLET, FILM COATED ORAL at 15:42

## 2019-09-26 RX ADMIN — Medication 5 MG: at 11:10

## 2019-09-26 RX ADMIN — DIPHENHYDRAMINE HYDROCHLORIDE 12.5 MG: 50 INJECTION, SOLUTION INTRAMUSCULAR; INTRAVENOUS at 08:41

## 2019-09-26 RX ADMIN — SODIUM CHLORIDE, POTASSIUM CHLORIDE, SODIUM LACTATE AND CALCIUM CHLORIDE: 600; 310; 30; 20 INJECTION, SOLUTION INTRAVENOUS at 11:06

## 2019-09-26 RX ADMIN — ROCURONIUM BROMIDE 20 MG: 10 INJECTION INTRAVENOUS at 08:35

## 2019-09-26 RX ADMIN — GABAPENTIN 300 MG: 300 CAPSULE ORAL at 21:11

## 2019-09-26 RX ADMIN — GABAPENTIN 300 MG: 300 CAPSULE ORAL at 06:55

## 2019-09-26 RX ADMIN — ACETAMINOPHEN 975 MG: 325 TABLET, FILM COATED ORAL at 06:55

## 2019-09-26 RX ADMIN — PROPOFOL 120 MG: 10 INJECTION, EMULSION INTRAVENOUS at 07:52

## 2019-09-26 RX ADMIN — CEFAZOLIN 1 G: 1 INJECTION, POWDER, FOR SOLUTION INTRAMUSCULAR; INTRAVENOUS at 18:15

## 2019-09-26 RX ADMIN — Medication 1 SPRAY: at 15:43

## 2019-09-26 RX ADMIN — CEFAZOLIN SODIUM 1 G: 2 INJECTION, SOLUTION INTRAVENOUS at 09:56

## 2019-09-26 RX ADMIN — ROCURONIUM BROMIDE 40 MG: 10 INJECTION INTRAVENOUS at 07:52

## 2019-09-26 RX ADMIN — MIDAZOLAM 2 MG: 1 INJECTION INTRAMUSCULAR; INTRAVENOUS at 07:48

## 2019-09-26 RX ADMIN — ONDANSETRON 4 MG: 2 INJECTION INTRAMUSCULAR; INTRAVENOUS at 10:36

## 2019-09-26 RX ADMIN — DEXMEDETOMIDINE HYDROCHLORIDE 0.5 MCG/KG/HR: 100 INJECTION, SOLUTION INTRAVENOUS at 08:07

## 2019-09-26 RX ADMIN — CEFAZOLIN SODIUM 2 G: 2 INJECTION, SOLUTION INTRAVENOUS at 07:58

## 2019-09-26 ASSESSMENT — ACTIVITIES OF DAILY LIVING (ADL)
ADLS_ACUITY_SCORE: 10
ADLS_ACUITY_SCORE: 10

## 2019-09-26 ASSESSMENT — MIFFLIN-ST. JEOR: SCORE: 1211.56

## 2019-09-26 NOTE — PROGRESS NOTES
Admission medication history interview status for the 9/26/2019  admission is complete. See EPIC admission navigator for prior to admission medications     Medication history source reliability:Moderate    Medication history interview source(s):Patient    Medication history resources (including written lists, pill bottles, clinic record):PHONE CALL    Primary pharmacy.[facility]    Additional medication history information not noted on PTA med list :    BROUGHT OWN SUPPLY:  *PROAIR HFA INH  *SYMICORT 80-4.5 MCG/ACT INH  *FLUTICASONE 50 MCG/ACT INH  *LEVOCETIRIZINE 5MG  *DAILY PROBIOTIC    Time spent in this activity: 55 MINUTES    Prior to Admission medications    Medication Sig Last Dose Taking? Auth Provider   acetaminophen (TYLENOL) 500 MG tablet Take 500-1,000 mg by mouth 2 times daily as needed for mild pain MORE THAN A WEEK at PRN Yes Reported, Patient   albuterol (PROAIR HFA) 108 (90 Base) MCG/ACT inhaler Inhale 1-2 puffs into the lungs every 6 hours as needed MORE THAN A WEEK at PRN Yes Radha Vazquez MD   Biotin w/ Vitamins C & E (HAIR SKIN & NAILS GUMMIES) 1250-7.5-7.5 MCG-MG-UNT CHEW Take 2 chew tab by mouth daily 9/24/2019 at AM Yes Reported, Patient   budesonide-formoterol (SYMBICORT) 80-4.5 MCG/ACT Inhaler Inhale 2 puffs into the lungs every evening 9/25/2019 at PM Yes Reported, Patient   calcium-vitamin D-vitamin K (VIACTIV) 500-500-40 MG-UNT-MCG CHEW Take 2 tablets by mouth daily MORE THAN 2 WEEKs at AM Yes Reported, Patient   EPINEPHrine (EPIPEN) 0.3 MG/0.3ML injection Inject 0.3 mLs (0.3 mg) into the muscle once as needed for anaphylaxis MORE THAN A MONTH at PRN Yes Jessica Smith MD   FIBER PO Take 2 tablets by mouth daily (with lunch)  9/25/2019 at AM Yes Reported, Patient   fluticasone (FLONASE) 50 MCG/ACT nasal spray Spray 1 spray into both nostrils daily 9/25/2019 at AM Yes Reported, Patient   levocetirizine (XYZAL) 5 MG tablet Take 5 mg by mouth every morning 9/25/2019 at AM Yes  Reported, Patient   levothyroxine (SYNTHROID/LEVOTHROID) 88 MCG tablet Take 1 tablet (88 mcg) by mouth daily  Patient taking differently: Take 88 mcg by mouth daily GABBI: Brand Name Synthroid 9/25/2019 at AM Yes Jessica Smith MD   melatonin 5 MG tablet Take 10 mg by mouth At Bedtime (5MG X 2 = 10MG) 9/25/2019 at PM Yes Reported, Patient   montelukast (SINGULAIR) 10 MG tablet Take 10 mg by mouth At Bedtime 9/25/2019 at PM Yes Reported, Patient   Multiple Vitamins-Minerals (WOMENS MULTI VITAMIN & MINERAL) TABS Take 1 tablet by mouth daily 9/25/2019 at AM Yes Reported, Patient   Probiotic Product (DAILY PROBIOTIC) CAPS Take 1 capsule by mouth daily 9/25/2019 at AM Yes Reported, Patient   traMADol (ULTRAM) 50 MG tablet Take  mg by mouth 3 times daily as needed for severe pain  9/25/2019 at PM Yes Reported, Patient   traZODone (DESYREL) 100 MG tablet Take 1 tablet (100 mg) by mouth At Bedtime 9/25/2019 at PM Yes Jessica Smith MD

## 2019-09-26 NOTE — CONSULTS
Su Tinoco is a 65 yo female with a PMH persistant asthma who is admitted under the care of [facility] and s/p L4-S1 laminectomy and fusion. Patient's VSS post-op and she is currently on room air. I have reconciled her PTA medications including her inhalers. No other active medical issues. Hospitalist service will sign off. Please do no hesitate to call if additional concerns arise.    Mary Young PA-C  Hospitalist Service  [phone]

## 2019-09-26 NOTE — ANESTHESIA PREPROCEDURE EVALUATION
Anesthesia Pre-Procedure Evaluation    Patient: Su Tinoco   MRN: [id number] : 1955          Preoperative Diagnosis: BACK PAIN    Procedure(s):  L4-S1 LAMINECTOMY, L5-S1 POSTERIOR SPINAL FUSION (C-ARM, MICROSCOPE, SEA SPINE WITH [facility] SPINE REP)^    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      Colitis 2018     Mild intermittent asthma     sees allergist     Other specified disorders of thyroid      Spinal stenosis of lumbar region     L5, S1     Spondylolisthesis at L5-S1 level      Sprain of unspecified site of back      Trochanteric bursitis of right hip      Unspecified hypothyroidism      Past Surgical History:   Procedure Laterality Date     BREAST SURGERY      breaast reduction     C NONSPECIFIC PROCEDURE      Laparoscopies IUD infection     C NONSPECIFIC PROCEDURE      removal of myxoid tumors fingers     C NONSPECIFIC PROCEDURE      sphincterotomy of anus     C/SECTION, LOW TRANSVERSE      , Low Transverse     EYE SURGERY      blepharoplasty     TONSILLECTOMY & ADENOIDECTOMY       TUBAL LIGATION         Anesthesia Evaluation     .             ROS/MED HX    ENT/Pulmonary:     (+)asthma , . .   (-) sleep apnea   Neurologic:     (+)migraines,     Cardiovascular:         METS/Exercise Tolerance:     Hematologic:         Musculoskeletal:         GI/Hepatic:        (-) GERD   Renal/Genitourinary:         Endo:     (+) thyroid problem hypothyroidism, .      Psychiatric:     (+) psychiatric history anxiety      Infectious Disease:         Malignancy:         Other:                                 Lab Results   Component Value Date    WBC 4.7 2019    HGB 12.9 2019    HCT 40.1 2019     2019    SED 9 2019     2019    POTASSIUM 4.0 2019    CHLORIDE 105 2019    CO2 32 2019    BUN 21 2019    CR 0.84 2019    GLC 95 2019    ELIGIO  "9.6 05/24/2019    ALBUMIN 4.7 (H) 03/15/2008    PROTTOTAL 7.8 03/15/2008    ALT 17 03/15/2008    AST 29 03/15/2008    ALKPHOS 55 03/15/2008    BILITOTAL <0.1 (L) 03/15/2008    LIPASE 117 03/15/2008    INR 1.10 09/13/2019    TSH 0.60 05/24/2019    T4 1.31 10/20/2004    HCG Negative 03/15/2008       Preop Vitals  BP Readings from Last 3 Encounters:   09/26/19 (!) 145/81   09/13/19 129/74   05/23/19 106/68    Pulse Readings from Last 3 Encounters:   09/13/19 60   05/23/19 74   04/02/19 81      Resp Readings from Last 3 Encounters:   09/26/19 14   09/13/19 16   05/23/19 18    SpO2 Readings from Last 3 Encounters:   09/26/19 100%   09/13/19 97%   05/23/19 99%      Temp Readings from Last 1 Encounters:   09/26/19 36.8  C (98.2  F) (Temporal)    Ht Readings from Last 1 Encounters:   09/26/19 1.588 m (5' 2.5\")      Wt Readings from Last 1 Encounters:   09/26/19 69 kg (152 lb 3.2 oz)    Estimated body mass index is 27.39 kg/m  as calculated from the following:    Height as of this encounter: 1.588 m (5' 2.5\").    Weight as of this encounter: 69 kg (152 lb 3.2 oz).       Anesthesia Plan      History & Physical Review  History and physical reviewed and following examination; no interval change.    ASA Status:  2 .    NPO Status:  > 8 hours    Plan for General and ETT with Intravenous induction. Maintenance will be Balanced.    PONV prophylaxis:  Ondansetron (or other 5HT-3) and Dexamethasone or Solumedrol  Precedex gtt      Postoperative Care  Postoperative pain management:  IV analgesics and Oral pain medications.      Consents  Anesthetic plan, risks, benefits and alternatives discussed with:  Patient..                 Elle Flores MD, MD  "

## 2019-09-26 NOTE — PLAN OF CARE
POD#0 L4-S1 lami, L5-S1 PSF.  Low B/P's, capno initially not WDL, both improving by end of shift.  Rating pain 2-4/10, decreased with IV Dilaudid. Strength 5/5 all extremities, denies N/T.  Active BS, BM this AM prior to surgery, quinones and hmv in place.  Scoring green on aggression scale.  Plan to work with therapies in AM, discharge pending.

## 2019-09-26 NOTE — ANESTHESIA CARE TRANSFER NOTE
Patient: Su Tinoco    Procedure(s):  L4-S1 LAMINECTOMY, L5-S1 POSTERIOR SPINAL FUSION    Diagnosis: BACK PAIN  Diagnosis Additional Information: No value filed.    Anesthesia Type:   General, ETT     Note:  Airway :Face Mask  Patient transferred to:PACU  Comments: Extubated awake in OR, exchanging well, to recovery, VSSHandoff Report: Identifed the Patient, Identified the Reponsible Provider, Reviewed the pertinent medical history, Discussed the surgical course, Reviewed Intra-OP anesthesia mangement and issues during anesthesia, Set expectations for post-procedure period and Allowed opportunity for questions and acknowledgement of understanding      Vitals: (Last set prior to Anesthesia Care Transfer)    CRNA VITALS  9/26/2019 1044 - 9/26/2019 1119      9/26/2019             Pulse:  87    SpO2:  100 %    Resp Rate (observed):  (!) 5                Electronically Signed By: ANDRÉS Dominguez CRNA  September 26, 2019  11:19 AM

## 2019-09-26 NOTE — ANESTHESIA POSTPROCEDURE EVALUATION
Patient: Su Tinoco    Procedure(s):  L4-S1 LAMINECTOMY, L5-S1 POSTERIOR SPINAL FUSION    Diagnosis:BACK PAIN  Diagnosis Additional Information: No value filed.    Anesthesia Type:  General, ETT    Note:  Anesthesia Post Evaluation    Patient location during evaluation: PACU  Patient participation: Able to fully participate in evaluation  Level of consciousness: awake  Pain management: adequate  Airway patency: patent  Cardiovascular status: acceptable  Respiratory status: acceptable  Hydration status: acceptable  PONV: none     Anesthetic complications: None          Last vitals:  Vitals:    09/26/19 1258 09/26/19 1304 09/26/19 1332   BP: (!) 86/43 95/54 104/45   Pulse: 69 70 52   Resp: 16     Temp: 36.7  C (98  F)     SpO2: 100%  100%         Electronically Signed By: Elle Flores MD, MD  September 26, 2019  1:53 PM

## 2019-09-27 ENCOUNTER — DOCUMENTATION ONLY (OUTPATIENT)
Dept: OTHER | Facility: CLINIC | Age: 64
End: 2019-09-27

## 2019-09-27 ENCOUNTER — APPOINTMENT (OUTPATIENT)
Dept: PHYSICAL THERAPY | Facility: CLINIC | Age: 64
DRG: 460 | End: 2019-09-27
Attending: ORTHOPAEDIC SURGERY
Payer: COMMERCIAL

## 2019-09-27 LAB
ALBUMIN SERPL-MCNC: 2.7 G/DL (ref 3.4–5)
ALP SERPL-CCNC: 59 U/L (ref 40–150)
ALT SERPL W P-5'-P-CCNC: 23 U/L (ref 0–50)
ANION GAP SERPL CALCULATED.3IONS-SCNC: 3 MMOL/L (ref 3–14)
AST SERPL W P-5'-P-CCNC: 22 U/L (ref 0–45)
BILIRUB SERPL-MCNC: 0.4 MG/DL (ref 0.2–1.3)
BUN SERPL-MCNC: 11 MG/DL (ref 7–30)
CALCIUM SERPL-MCNC: 8 MG/DL (ref 8.5–10.1)
CHLORIDE SERPL-SCNC: 111 MMOL/L (ref 94–109)
CO2 SERPL-SCNC: 28 MMOL/L (ref 20–32)
CREAT SERPL-MCNC: 0.7 MG/DL (ref 0.52–1.04)
GFR SERPL CREATININE-BSD FRML MDRD: >90 ML/MIN/{1.73_M2}
GLUCOSE SERPL-MCNC: 103 MG/DL (ref 70–99)
HGB BLD-MCNC: 10.4 G/DL (ref 11.7–15.7)
POTASSIUM SERPL-SCNC: 4.2 MMOL/L (ref 3.4–5.3)
PROT SERPL-MCNC: 5.7 G/DL (ref 6.8–8.8)
SODIUM SERPL-SCNC: 142 MMOL/L (ref 133–144)

## 2019-09-27 PROCEDURE — 12000000 ZZH R&B MED SURG/OB

## 2019-09-27 PROCEDURE — 25000132 ZZH RX MED GY IP 250 OP 250 PS 637: Performed by: ORTHOPAEDIC SURGERY

## 2019-09-27 PROCEDURE — 80053 COMPREHEN METABOLIC PANEL: CPT | Performed by: ORTHOPAEDIC SURGERY

## 2019-09-27 PROCEDURE — 85018 HEMOGLOBIN: CPT | Performed by: ORTHOPAEDIC SURGERY

## 2019-09-27 PROCEDURE — 25000132 ZZH RX MED GY IP 250 OP 250 PS 637: Performed by: PHYSICIAN ASSISTANT

## 2019-09-27 PROCEDURE — 97161 PT EVAL LOW COMPLEX 20 MIN: CPT | Mod: GP | Performed by: PHYSICAL THERAPIST

## 2019-09-27 PROCEDURE — 97530 THERAPEUTIC ACTIVITIES: CPT | Mod: GP | Performed by: PHYSICAL THERAPIST

## 2019-09-27 PROCEDURE — 25000128 H RX IP 250 OP 636: Performed by: ORTHOPAEDIC SURGERY

## 2019-09-27 PROCEDURE — 36415 COLL VENOUS BLD VENIPUNCTURE: CPT | Performed by: ORTHOPAEDIC SURGERY

## 2019-09-27 PROCEDURE — 97116 GAIT TRAINING THERAPY: CPT | Mod: GP

## 2019-09-27 PROCEDURE — 97116 GAIT TRAINING THERAPY: CPT | Mod: GP | Performed by: PHYSICAL THERAPIST

## 2019-09-27 RX ORDER — HYDROCODONE BITARTRATE AND ACETAMINOPHEN 5; 325 MG/1; MG/1
1-2 TABLET ORAL EVERY 4 HOURS PRN
Qty: 50 TABLET | Refills: 0 | Status: SHIPPED | OUTPATIENT
Start: 2019-09-27 | End: 2020-05-12

## 2019-09-27 RX ORDER — GABAPENTIN 300 MG/1
300 CAPSULE ORAL 2 TIMES DAILY
Qty: 60 CAPSULE | Refills: 0 | Status: SHIPPED | OUTPATIENT
Start: 2019-09-27 | End: 2020-05-12

## 2019-09-27 RX ORDER — AMOXICILLIN 250 MG
1 CAPSULE ORAL 2 TIMES DAILY
Qty: 50 TABLET | Refills: 0 | Status: SHIPPED | OUTPATIENT
Start: 2019-09-27 | End: 2020-05-12

## 2019-09-27 RX ADMIN — HYDROMORPHONE HYDROCHLORIDE 0.5 MG: 1 INJECTION, SOLUTION INTRAMUSCULAR; INTRAVENOUS; SUBCUTANEOUS at 10:00

## 2019-09-27 RX ADMIN — GABAPENTIN 300 MG: 300 CAPSULE ORAL at 08:01

## 2019-09-27 RX ADMIN — TRAZODONE HYDROCHLORIDE 100 MG: 100 TABLET ORAL at 21:50

## 2019-09-27 RX ADMIN — CEFAZOLIN 1 G: 1 INJECTION, POWDER, FOR SOLUTION INTRAMUSCULAR; INTRAVENOUS at 01:45

## 2019-09-27 RX ADMIN — ACETAMINOPHEN 975 MG: 325 TABLET, FILM COATED ORAL at 16:05

## 2019-09-27 RX ADMIN — LEVOTHYROXINE SODIUM 88 MCG: 88 TABLET ORAL at 08:01

## 2019-09-27 RX ADMIN — HYDROMORPHONE HYDROCHLORIDE 0.5 MG: 1 INJECTION, SOLUTION INTRAMUSCULAR; INTRAVENOUS; SUBCUTANEOUS at 21:57

## 2019-09-27 RX ADMIN — HYDROMORPHONE HYDROCHLORIDE 0.5 MG: 1 INJECTION, SOLUTION INTRAMUSCULAR; INTRAVENOUS; SUBCUTANEOUS at 17:34

## 2019-09-27 RX ADMIN — ACETAMINOPHEN 975 MG: 325 TABLET, FILM COATED ORAL at 00:18

## 2019-09-27 RX ADMIN — MONTELUKAST 10 MG: 10 TABLET, FILM COATED ORAL at 21:50

## 2019-09-27 RX ADMIN — HYDROCODONE BITARTRATE AND ACETAMINOPHEN 1 TABLET: 5; 325 TABLET ORAL at 11:43

## 2019-09-27 RX ADMIN — GABAPENTIN 300 MG: 300 CAPSULE ORAL at 20:57

## 2019-09-27 RX ADMIN — SENNOSIDES AND DOCUSATE SODIUM 1 TABLET: 8.6; 5 TABLET ORAL at 20:57

## 2019-09-27 RX ADMIN — HYDROCODONE BITARTRATE AND ACETAMINOPHEN 1 TABLET: 5; 325 TABLET ORAL at 16:05

## 2019-09-27 RX ADMIN — HYDROCODONE BITARTRATE AND ACETAMINOPHEN 1 TABLET: 5; 325 TABLET ORAL at 07:58

## 2019-09-27 RX ADMIN — HYDROMORPHONE HYDROCHLORIDE 0.5 MG: 1 INJECTION, SOLUTION INTRAMUSCULAR; INTRAVENOUS; SUBCUTANEOUS at 00:31

## 2019-09-27 RX ADMIN — SENNOSIDES AND DOCUSATE SODIUM 1 TABLET: 8.6; 5 TABLET ORAL at 08:01

## 2019-09-27 RX ADMIN — ACETAMINOPHEN 975 MG: 325 TABLET, FILM COATED ORAL at 07:58

## 2019-09-27 RX ADMIN — BUDESONIDE AND FORMOTEROL FUMARATE DIHYDRATE 2 PUFF: 80; 4.5 AEROSOL RESPIRATORY (INHALATION) at 20:58

## 2019-09-27 ASSESSMENT — ACTIVITIES OF DAILY LIVING (ADL)
ADLS_ACUITY_SCORE: 10

## 2019-09-27 ASSESSMENT — MIFFLIN-ST. JEOR: SCORE: 1205.67

## 2019-09-27 NOTE — PROGRESS NOTES
History:   Minimal complaints. Has ambulated in hallway already once today. Ferrari removed, voiding, tolerating sandwich for lunch. Norco for pain, hypotension resolved  Vitals:   B/P: 115/65, T: 98.5, P: 72, R: 14    Intake/Output Summary (Last 24 hours) at 9/27/2019 1321  Last data filed at 9/27/2019 1200  Gross per 24 hour   Intake 1920 ml   Output 3670 ml   Net -1750 ml      Results for orders placed or performed during the hospital encounter of 09/26/19 (from the past 24 hour(s))   Creatinine   Result Value Ref Range    Creatinine 0.70 0.52 - 1.04 mg/dL    GFR Estimate >90 >60 mL/min/[1.73_m2]    GFR Estimate If Black >90 >60 mL/min/[1.73_m2]   Hemoglobin   Result Value Ref Range    Hemoglobin 10.4 (L) 11.7 - 15.7 g/dL   Comprehensive metabolic panel   Result Value Ref Range    Sodium 142 133 - 144 mmol/L    Potassium 4.2 3.4 - 5.3 mmol/L    Chloride 111 (H) 94 - 109 mmol/L    Carbon Dioxide 28 20 - 32 mmol/L    Anion Gap 3 3 - 14 mmol/L    Glucose 103 (H) 70 - 99 mg/dL    Urea Nitrogen 11 7 - 30 mg/dL    Creatinine 0.70 0.52 - 1.04 mg/dL    GFR Estimate >90 >60 mL/min/[1.73_m2]    GFR Estimate If Black >90 >60 mL/min/[1.73_m2]    Calcium 8.0 (L) 8.5 - 10.1 mg/dL    Bilirubin Total 0.4 0.2 - 1.3 mg/dL    Albumin 2.7 (L) 3.4 - 5.0 g/dL    Protein Total 5.7 (L) 6.8 - 8.8 g/dL    Alkaline Phosphatase 59 40 - 150 U/L    ALT 23 0 - 50 U/L    AST 22 0 - 45 U/L       Dressing:   Dry and intact.   Neuro:   Motor: 5/5   Sensation: intact  Extremities:   No calf tenderness  A/P:   Stable POD # Doing well POD #1 L5-S1 laminectomy and fusion, L4-5 laminectomy  Mobilize  Plan discharge to home with  on Sunday

## 2019-09-27 NOTE — PROVIDER NOTIFICATION
MD Notification    Notified Person: MD    Notified Person Name:LAKIA Bearden for Dr. Howell    Notification Date/Time: 9/27/19 1000    Notification Interaction: telephone    Purpose of Notification: POD 1- can I remove quinones catheter?     Orders Received: OK to remove catheter    Comments:

## 2019-09-27 NOTE — PROGRESS NOTES
09/27/19 1000   Quick Adds   Type of Visit Initial PT Evaluation   Living Environment   Lives With spouse   Living Arrangements house   Home Accessibility stairs to enter home   Number of Stairs, Main Entrance 2   Stair Railings, Main Entrance none   Transportation Anticipated family or friend will provide   Living Environment Comment walk in shower, shower seat   Self-Care   Usual Activity Tolerance good   Current Activity Tolerance moderate   Regular Exercise Yes   Activity/Exercise Type other (see comments)  (yoga, but limited last 6 month)   Equipment Currently Used at Home none  (pt obtained,all equipment from OT friend)   Functional Level Prior   Ambulation 0-->independent   Transferring 0-->independent   Toileting 0-->independent   Bathing 0-->independent   Communication 0-->understands/communicates without difficulty   Swallowing 0-->swallows foods/liquids without difficulty   Cognition 0 - no cognition issues reported   Fall history within last six months no   Which of the above functional risks had a recent onset or change? none   General Information   Onset of Illness/Injury or Date of Surgery - Date 09/26/19   Referring Physician Dante   Patient/Family Goals Statement pt plan to dc to home   Pertinent History of Current Problem (include personal factors and/or comorbidities that impact the POC) s/p spine surgery   Precautions/Limitations fall precautions;spinal precautions   Weight-Bearing Status - LLE full weight-bearing   Weight-Bearing Status - RLE full weight-bearing   Cognitive Status Examination   Orientation orientation to person, place and time   Level of Consciousness alert   Follows Commands and Answers Questions 100% of the time   Personal Safety and Judgment intact   Memory intact   Pain Assessment   Patient Currently in Pain Yes, see Vital Sign flowsheet   Integumentary/Edema   Integumentary/Edema Comments per surgery   Range of Motion (ROM)   ROM Comment trunk ROM limited s/p spine  "surgery   Strength   Strength Comments B LE limited with pain, able to sit to stand with SBA   Bed Mobility   Bed Mobility Comments bed mob with cues and A for set up   Transfer Skills   Transfer Comments sit to stand with CGA   Gait   Gait Comments amb with no AD, slow small steps, CGA 20'   General Therapy Interventions   Planned Therapy Interventions bed mobility training;gait training;transfer training;risk factor education;home program guidelines;progressive activity/exercise   Clinical Impression   Criteria for Skilled Therapeutic Intervention yes, treatment indicated   PT Diagnosis difficulty walking   Influenced by the following impairments post op pain, decreased strength, ROM, act leyla   Functional limitations due to impairments impaired funcitonal mob I and safety   Clinical Presentation Stable/Uncomplicated   Clinical Presentation Rationale clinical juudgement   Clinical Decision Making (Complexity) Low complexity   Therapy Frequency 2x/day   Predicted Duration of Therapy Intervention (days/wks) 3 days   Anticipated Discharge Disposition Home with Assist   Risk & Benefits of therapy have been explained Yes   Patient, Family & other staff in agreement with plan of care Yes   [facility] [email or web address] TM \"6 Clicks\"   2016, Trustees of [facility], under license to [email or web address].  All rights reserved.   6 Clicks Short Forms Basic Mobility Inpatient Short Form   [facility] AM-PAC  \"6 Clicks\" V.2 Basic Mobility Inpatient Short Form   1. Turning from your back to your side while in a flat bed without using bedrails? 4 - None   2. Moving from lying on your back to sitting on the side of a flat bed without using bedrails? 4 - None   3. Moving to and from a bed to a chair (including a wheelchair)? 4 - None   4. Standing up from a chair using your arms (e.g., wheelchair, or bedside chair)? 4 - None   5. To walk in hospital room? 4 - None   6. Climbing 3-5 steps with a railing? 3 - A Little   Basic " Mobility Raw Score (Score out of 24.Lower scores equate to lower levels of function) 23   Total Evaluation Time   Total Evaluation Time (Minutes) 10

## 2019-09-27 NOTE — PLAN OF CARE
POD1 L4-S1 Lami, L5-S1 PSF. VSS on RA. Up with SBA + brace, ambulated halls x2- tolerated well. Pain controlled with scheduled Tylenol, PRN Norco x3 and PRN Dilaudid x2 for breakthrough pain with relief. Tolerating regular diet, BS active (+) flatus. Ferrari removed this AM, voiding well in BR. L PIV infiltrated, new R PIV placed. Back dressing CDI, hemovac with bloody output. Plan for discharge to home on Sunday.

## 2019-09-27 NOTE — PLAN OF CARE
L4-S1 Lami, L5-S1 PSF with Dr. Howell. POD1. A&Ox4. Tolerating a regular diet. Neuros intact. CMS intact. +BS. VSS, on 2 L O2. BP parameters of <140. Saline locked. Ferrari patent and secured with adequate output. Hemovac with output 125 ml (10p-5a). Dressing CDI. Rating pain 5/10, IV diluadid (q1h) given, last at 0100 and scheduled tylenol, pain decreased. Plan: Therapy in AM, discharge pending.

## 2019-09-27 NOTE — PLAN OF CARE
Discharge Planner PT   Patient plan for discharge: home with spouse  Current status: PT eval completed and treatment initiaited.  Pt s/p spine surgery, previously living at home with spouse with 2 steps to enter without rail.  Previously I with all mob and ADL's, pt reports she has obtained equipment from OT friend.    Pt currently able to complete bed mob with roll tech to sit on EOB with SBA, transfers sit to stand with SBA, amb with no AD ', completes steps with 1 rail with CGA.  Pt educated in HEP and posture and movement HO.  C/o pain down L LE after activity.  Barriers to return to prior living situation: none  Recommendations for discharge: home with spouse A for SBA on steps to enter and for household tasks due to spine precautions.  Rationale for recommendations: Anticipate pt will meet goals for safe discharge to home with spouse with cont in pt PT       Entered by: BUBBA MATSON 09/27/2019 11:43 AM

## 2019-09-27 NOTE — OP NOTE
Procedure Date: 09/26/2019      PREOPERATIVE DIAGNOSES:  Congenital lumbar spondylolisthesis L5-S1, lumbar spinal stenosis L4-5 and S1 radiculopathy.      POSTOPERATIVE DIAGNOSES:  Congenital lumbar spondylolisthesis L5-S1, lumbar spinal stenosis L4-5 and S1 radiculopathy.      PROCEDURES:   1.  L4 and L5 laminectomy, foraminotomy for spinal canal and nerve root decompression by decompression of bilateral L4 and L5 nerve roots, L5-S1 posterior Chevron osteotomy for spondylolisthesis with bilateral S1 foraminotomies.   2.  Nonsegmental instrumentation, L5-S1 pedicle screws.   3.  One-level post-arthrodesis L5-S1 with local bone, cancellous bone and i-Factor bone protein.      SURGEON:  Wily Howell MD      ASSISTANT:  Suleiman Adame PA-C      ANESTHESIA:  General.      ESTIMATED BLOOD LOSS:  150 mL.      INDICATIONS FOR SURGICAL TREATMENT:  Lumbar spinal stenosis, spondylolisthesis.  Risks and benefits discussed of heart attack, stroke, blood clot, wound infection, chronic back pain, chronic leg pain, permanent pain, permanent weakness, permanent paraplegia, permanent bowel dysfunction, chronic pain, chronic weakness, need for revision surgeries.  Informed consent was obtained.      DESCRIPTION OF PROCEDURE:  The patient was intubated by Anesthesia, positioned prone on the Lorenzo table and lumbar spine prepped and draped sterilely.  A timeout was performed.      The lumbar spine was exposed from L4 to S1, levels confirmed with lateral fluoroscopic imaging.  Decompression was performed, laminectomy performed at L4 and L5 with a high-speed drill, Leksell, 3 and 4 mm Kerrisons to decompress the central spinal canal, lateral recess.  Foraminotomy was performed of the L4 and L5 nerve roots with 3 and 4 mm Kerrisons.  There was severe spinal stenosis of the L5 nerve root bilaterally, left greater than right.  At L5-S1, we did a posterior Chevron osteotomy removing pars and facet complex at L5-S1 to help with  spondylolisthesis reduction.  I did foraminotomies of the S1 nerve root with a 3 mm Kerrison, decompressed, and ball-tip probe was passed through L4, L5 and S1 nerve roots.        Next, with fluoroscopic assistance, I tapped in place 7.5 mm pedicle screws at L5 and 8.5 mm bicortical pedicle screws at S1 with good insertional torque.  I then irrigated the wound with 2 liters of saline, decorticated posterolaterally at L5-S1 and placed local bone, cancellous bone and i-Factor bone protein posterolaterally for 1 level arthrodesis.  I placed my rods, distracted for foraminal decompression, secured my set screws and counter torqued.  Final images confirmed good hardware position.        Hemostasis achieved.  Deep drain placed exiting outside the wound.  Vancomycin placed.  Soft tissue closed in layers.  Sterile dressing applied.  The patient was extubated and brought to the recovery room stable.         REJI GUZMAN MD             D: 2019   T: 2019   MT: CYRUS      Name:     ADRIAN LOZADA   MRN:      [id number]        Account:        [id number]   :      1955           Procedure Date: 2019      Document: [id number]

## 2019-09-28 ENCOUNTER — APPOINTMENT (OUTPATIENT)
Dept: PHYSICAL THERAPY | Facility: CLINIC | Age: 64
DRG: 460 | End: 2019-09-28
Attending: ORTHOPAEDIC SURGERY
Payer: COMMERCIAL

## 2019-09-28 LAB
ALBUMIN SERPL-MCNC: 2.8 G/DL (ref 3.4–5)
ALP SERPL-CCNC: 56 U/L (ref 40–150)
ALT SERPL W P-5'-P-CCNC: 23 U/L (ref 0–50)
ANION GAP SERPL CALCULATED.3IONS-SCNC: 3 MMOL/L (ref 3–14)
AST SERPL W P-5'-P-CCNC: 22 U/L (ref 0–45)
BILIRUB SERPL-MCNC: 0.3 MG/DL (ref 0.2–1.3)
BUN SERPL-MCNC: 12 MG/DL (ref 7–30)
CALCIUM SERPL-MCNC: 8.4 MG/DL (ref 8.5–10.1)
CHLORIDE SERPL-SCNC: 107 MMOL/L (ref 94–109)
CO2 SERPL-SCNC: 30 MMOL/L (ref 20–32)
CREAT SERPL-MCNC: 0.62 MG/DL (ref 0.52–1.04)
GFR SERPL CREATININE-BSD FRML MDRD: >90 ML/MIN/{1.73_M2}
GLUCOSE BLDC GLUCOMTR-MCNC: 103 MG/DL (ref 70–99)
GLUCOSE SERPL-MCNC: 147 MG/DL (ref 70–99)
HGB BLD-MCNC: 9.8 G/DL (ref 11.7–15.7)
POTASSIUM SERPL-SCNC: 3.6 MMOL/L (ref 3.4–5.3)
PROT SERPL-MCNC: 5.7 G/DL (ref 6.8–8.8)
SODIUM SERPL-SCNC: 140 MMOL/L (ref 133–144)

## 2019-09-28 PROCEDURE — 80053 COMPREHEN METABOLIC PANEL: CPT | Performed by: ORTHOPAEDIC SURGERY

## 2019-09-28 PROCEDURE — 25000132 ZZH RX MED GY IP 250 OP 250 PS 637: Performed by: ORTHOPAEDIC SURGERY

## 2019-09-28 PROCEDURE — 25000132 ZZH RX MED GY IP 250 OP 250 PS 637: Performed by: PHYSICIAN ASSISTANT

## 2019-09-28 PROCEDURE — 12000000 ZZH R&B MED SURG/OB

## 2019-09-28 PROCEDURE — 40000894 ZZH STATISTIC OT IP EVAL DEFER

## 2019-09-28 PROCEDURE — 36415 COLL VENOUS BLD VENIPUNCTURE: CPT | Performed by: ORTHOPAEDIC SURGERY

## 2019-09-28 PROCEDURE — 97116 GAIT TRAINING THERAPY: CPT | Mod: GP

## 2019-09-28 PROCEDURE — 00000146 ZZHCL STATISTIC GLUCOSE BY METER IP

## 2019-09-28 PROCEDURE — 85018 HEMOGLOBIN: CPT | Performed by: ORTHOPAEDIC SURGERY

## 2019-09-28 RX ADMIN — HYDROCODONE BITARTRATE AND ACETAMINOPHEN 2 TABLET: 5; 325 TABLET ORAL at 08:32

## 2019-09-28 RX ADMIN — Medication 1 SPRAY: at 12:43

## 2019-09-28 RX ADMIN — HYDROCODONE BITARTRATE AND ACETAMINOPHEN 1 TABLET: 5; 325 TABLET ORAL at 03:58

## 2019-09-28 RX ADMIN — BUDESONIDE AND FORMOTEROL FUMARATE DIHYDRATE 2 PUFF: 80; 4.5 AEROSOL RESPIRATORY (INHALATION) at 20:41

## 2019-09-28 RX ADMIN — GABAPENTIN 300 MG: 300 CAPSULE ORAL at 20:41

## 2019-09-28 RX ADMIN — LORATADINE 10 MG: 10 TABLET ORAL at 08:32

## 2019-09-28 RX ADMIN — TRAZODONE HYDROCHLORIDE 100 MG: 100 TABLET ORAL at 22:11

## 2019-09-28 RX ADMIN — HYDROCODONE BITARTRATE AND ACETAMINOPHEN 2 TABLET: 5; 325 TABLET ORAL at 00:19

## 2019-09-28 RX ADMIN — HYDROCODONE BITARTRATE AND ACETAMINOPHEN 2 TABLET: 5; 325 TABLET ORAL at 16:14

## 2019-09-28 RX ADMIN — SENNOSIDES AND DOCUSATE SODIUM 2 TABLET: 8.6; 5 TABLET ORAL at 20:41

## 2019-09-28 RX ADMIN — MELATONIN TAB 3 MG 10 MG: 3 TAB at 22:08

## 2019-09-28 RX ADMIN — SENNOSIDES AND DOCUSATE SODIUM 2 TABLET: 8.6; 5 TABLET ORAL at 08:32

## 2019-09-28 RX ADMIN — GABAPENTIN 300 MG: 300 CAPSULE ORAL at 08:32

## 2019-09-28 RX ADMIN — MONTELUKAST 10 MG: 10 TABLET, FILM COATED ORAL at 22:11

## 2019-09-28 RX ADMIN — HYDROCODONE BITARTRATE AND ACETAMINOPHEN 2 TABLET: 5; 325 TABLET ORAL at 20:40

## 2019-09-28 RX ADMIN — LEVOTHYROXINE SODIUM 88 MCG: 88 TABLET ORAL at 08:32

## 2019-09-28 RX ADMIN — CYCLOBENZAPRINE HYDROCHLORIDE 10 MG: 10 TABLET, FILM COATED ORAL at 22:11

## 2019-09-28 RX ADMIN — HYDROCODONE BITARTRATE AND ACETAMINOPHEN 1 TABLET: 5; 325 TABLET ORAL at 12:40

## 2019-09-28 ASSESSMENT — ACTIVITIES OF DAILY LIVING (ADL)
ADLS_ACUITY_SCORE: 10

## 2019-09-28 ASSESSMENT — MIFFLIN-ST. JEOR: SCORE: 1158.95

## 2019-09-28 NOTE — PLAN OF CARE
Discharge Planner PT   Patient plan for discharge: home with spouse  Current status: Pt independent with bed mobility. Assist needed to don brace. Pt ambulated 600' with no AD and SBA. Pt c/o L LE pain that cause a slight limp while ambulating. Pt needed a standing rest break due to increased pain. Pt performed 3 steps x1 with B rails due to L LE pain with a step through pattern. Pt left sitting in chair, ice placed on lower back and calf.  Barriers to return to prior living situation: none  Recommendations for discharge: home with spouse A for SBA on steps to enter and for household tasks due to spine precautions per plan established by the PT.  Rationale for recommendations: Anticipate pt will meet goals for safe discharge to home with spouse with cont in pt PT       Entered by: Scarlett Laughlin 09/28/2019 9:59 AM

## 2019-09-28 NOTE — PLAN OF CARE
Discharge Planner OT   Patient plan for discharge: home w/ A from  prn  Current status: OT orders received and chart reviewed. Pt aware of 3/3 spinal precautions and is SBA x 600 ft for ambulation w/o A.D. Discussed w/ pt- pt has all needed A.E from her 's prior hip surgery. Pt able to complete figure-four w/ ease to don/doff socks. Pt aware of techniques to complete g/h, dressing, bathing while maintaining spinal precautions and has no concerns upon discharge. No skilled IP OT needs identified. Will complete OT order.   Barriers to return to prior living situation: defer to PT  Recommendations for discharge: defer to PT  Rationale for recommendations: defer to PT; OT order completed.       Entered by: Yolande Felipe 09/28/2019 8:36 AM

## 2019-09-28 NOTE — PLAN OF CARE
Alert and oriented x's 4.  Taking Norco for pain with adequate relief. Up with SBA to bathroom.  Drain removed and dressing changed today as per order.  Will continue to monitor.

## 2019-09-28 NOTE — PLAN OF CARE
POD2 L4-S1 PSF. A&Ox4. CMS intact. BLE weakness d/t pain. VSS, RA. Up with SBA with brace. Dressing CDI. Hemovac 50cc. BS active and passing flatus. Voiding adequately. C/o back pain, decreased with norco and IV dilaudid. Plan for discharge home on Sunday.

## 2019-09-29 ENCOUNTER — APPOINTMENT (OUTPATIENT)
Dept: PHYSICAL THERAPY | Facility: CLINIC | Age: 64
DRG: 460 | End: 2019-09-29
Attending: ORTHOPAEDIC SURGERY
Payer: COMMERCIAL

## 2019-09-29 VITALS
OXYGEN SATURATION: 98 % | HEIGHT: 63 IN | TEMPERATURE: 98.7 F | BODY MASS INDEX: 24.91 KG/M2 | RESPIRATION RATE: 17 BRPM | DIASTOLIC BLOOD PRESSURE: 66 MMHG | WEIGHT: 140.6 LBS | SYSTOLIC BLOOD PRESSURE: 128 MMHG | HEART RATE: 71 BPM

## 2019-09-29 LAB
ALBUMIN SERPL-MCNC: 2.7 G/DL (ref 3.4–5)
ALP SERPL-CCNC: 54 U/L (ref 40–150)
ALT SERPL W P-5'-P-CCNC: 28 U/L (ref 0–50)
ANION GAP SERPL CALCULATED.3IONS-SCNC: 3 MMOL/L (ref 3–14)
AST SERPL W P-5'-P-CCNC: 31 U/L (ref 0–45)
BILIRUB SERPL-MCNC: 0.4 MG/DL (ref 0.2–1.3)
BUN SERPL-MCNC: 11 MG/DL (ref 7–30)
CALCIUM SERPL-MCNC: 9 MG/DL (ref 8.5–10.1)
CHLORIDE SERPL-SCNC: 105 MMOL/L (ref 94–109)
CO2 SERPL-SCNC: 32 MMOL/L (ref 20–32)
CREAT SERPL-MCNC: 0.64 MG/DL (ref 0.52–1.04)
GFR SERPL CREATININE-BSD FRML MDRD: >90 ML/MIN/{1.73_M2}
GLUCOSE SERPL-MCNC: 111 MG/DL (ref 70–99)
POTASSIUM SERPL-SCNC: 4 MMOL/L (ref 3.4–5.3)
PROT SERPL-MCNC: 5.8 G/DL (ref 6.8–8.8)
SODIUM SERPL-SCNC: 140 MMOL/L (ref 133–144)

## 2019-09-29 PROCEDURE — 25000132 ZZH RX MED GY IP 250 OP 250 PS 637: Performed by: ORTHOPAEDIC SURGERY

## 2019-09-29 PROCEDURE — 80053 COMPREHEN METABOLIC PANEL: CPT | Performed by: ORTHOPAEDIC SURGERY

## 2019-09-29 PROCEDURE — 25000132 ZZH RX MED GY IP 250 OP 250 PS 637: Performed by: PHYSICIAN ASSISTANT

## 2019-09-29 PROCEDURE — 97116 GAIT TRAINING THERAPY: CPT | Mod: GP

## 2019-09-29 PROCEDURE — 97530 THERAPEUTIC ACTIVITIES: CPT | Mod: GP

## 2019-09-29 PROCEDURE — 36415 COLL VENOUS BLD VENIPUNCTURE: CPT | Performed by: ORTHOPAEDIC SURGERY

## 2019-09-29 RX ORDER — OXYCODONE HYDROCHLORIDE 5 MG/1
5-10 TABLET ORAL
Qty: 50 TABLET | Refills: 0 | Status: SHIPPED | OUTPATIENT
Start: 2019-09-29 | End: 2019-10-08

## 2019-09-29 RX ORDER — CYCLOBENZAPRINE HCL 10 MG
10 TABLET ORAL 3 TIMES DAILY PRN
Qty: 30 TABLET | Refills: 0 | Status: SHIPPED | OUTPATIENT
Start: 2019-09-29 | End: 2020-05-12

## 2019-09-29 RX ADMIN — SENNOSIDES AND DOCUSATE SODIUM 2 TABLET: 8.6; 5 TABLET ORAL at 08:21

## 2019-09-29 RX ADMIN — LORATADINE 10 MG: 10 TABLET ORAL at 08:21

## 2019-09-29 RX ADMIN — ACETAMINOPHEN 975 MG: 325 TABLET, FILM COATED ORAL at 00:30

## 2019-09-29 RX ADMIN — Medication 1 SPRAY: at 08:25

## 2019-09-29 RX ADMIN — OXYCODONE HYDROCHLORIDE 10 MG: 5 TABLET ORAL at 12:28

## 2019-09-29 RX ADMIN — LEVOTHYROXINE SODIUM 88 MCG: 88 TABLET ORAL at 08:21

## 2019-09-29 RX ADMIN — OXYCODONE HYDROCHLORIDE 10 MG: 5 TABLET ORAL at 04:40

## 2019-09-29 RX ADMIN — OXYCODONE HYDROCHLORIDE 10 MG: 5 TABLET ORAL at 00:31

## 2019-09-29 RX ADMIN — ACETAMINOPHEN 975 MG: 325 TABLET, FILM COATED ORAL at 08:21

## 2019-09-29 RX ADMIN — OXYCODONE HYDROCHLORIDE 10 MG: 5 TABLET ORAL at 15:24

## 2019-09-29 RX ADMIN — OXYCODONE HYDROCHLORIDE 10 MG: 5 TABLET ORAL at 08:21

## 2019-09-29 ASSESSMENT — ACTIVITIES OF DAILY LIVING (ADL)
ADLS_ACUITY_SCORE: 10

## 2019-09-29 NOTE — PLAN OF CARE
POD 3 from a L4-S1 lami fusion. A&O x 4. CMS intact. Bowel sounds active, passing flatus, tolerating regular diet. No bm. VSS on RA ex hypotensive. Dressing on back CDI. Up independently. C/o back pain radiating to left leg, decreased with oxy, flexeril and ice pack. Pt scoring green on the Aggression Stop Light Tool. Plan discharge home pending.

## 2019-09-29 NOTE — PLAN OF CARE
Discharge Planner PT   Patient plan for discharge: home with spouse  Current status: Extra time needed to motivate pt to participate. Pt transferred supine to/from sit on EOB independently but slowly due to increased pain in L leg. Pt sat EOB and needed A to don/dof brace. Socks don/doffered while EOB. Pt transferred sit to/from stand with SBA. Pt ambulated 300' with no AD and SBA. Pt has a slight limp due to increased pain in L LE. Pt performed 3 steps x1 with no rails and SBA for safety and a step through pattern. Pt left supine in bed with ice placed on low back and calf area due to increased pain from ambulation.  Barriers to return to prior living situation: none  Recommendations for discharge: home with spouse A for SBA on steps to enter and for household tasks due to spine precautions per plan established by the PT.  Rationale for recommendations: Anticipate pt will meet goals for safe discharge to home with spouse with cont in pt PT       Entered by: Scarlett Laughlin 09/29/2019 10:56 AM

## 2019-09-29 NOTE — PLAN OF CARE
POD 2 from a L4-S1 lami fusion. A&O x 4. CMS intact. Bowel sounds active, passing flatus, tolerating regular diet. No bm. Gave scheduled bedtime senna. VSS. Dressing on back CDI. Up independently. C/o back pain radiating to left leg, decreased with norco, flexeril and ice pack. Pt scoring green on the Aggression Stop Light Tool. Plan discharge home pending.

## 2019-09-29 NOTE — PROGRESS NOTES
History:   Left leg pain stable, ambulating, tolerating diet, pain controlled  Vitals:   B/P: 121/64, T: 98.1, P: 71, R: 16    Intake/Output Summary (Last 24 hours) at 9/29/2019 1431  Last data filed at 9/28/2019 1700  Gross per 24 hour   Intake 240 ml   Output --   Net 240 ml      Results for orders placed or performed during the hospital encounter of 09/26/19 (from the past 24 hour(s))   Comprehensive metabolic panel   Result Value Ref Range    Sodium 140 133 - 144 mmol/L    Potassium 4.0 3.4 - 5.3 mmol/L    Chloride 105 94 - 109 mmol/L    Carbon Dioxide 32 20 - 32 mmol/L    Anion Gap 3 3 - 14 mmol/L    Glucose 111 (H) 70 - 99 mg/dL    Urea Nitrogen 11 7 - 30 mg/dL    Creatinine 0.64 0.52 - 1.04 mg/dL    GFR Estimate >90 >60 mL/min/[1.73_m2]    GFR Estimate If Black >90 >60 mL/min/[1.73_m2]    Calcium 9.0 8.5 - 10.1 mg/dL    Bilirubin Total 0.4 0.2 - 1.3 mg/dL    Albumin 2.7 (L) 3.4 - 5.0 g/dL    Protein Total 5.8 (L) 6.8 - 8.8 g/dL    Alkaline Phosphatase 54 40 - 150 U/L    ALT 28 0 - 50 U/L    AST 31 0 - 45 U/L     Dressing:   Dry and intact.   Neuro:   Motor: 5/5   Sensation: intact  Abdomen:  Soft NT  Extremities:   No calf tenderness  A/P:   Stable POD # 3 L5-S1 fusion  Discharge

## 2019-09-29 NOTE — DISCHARGE INSTRUCTIONS
Dr. Howell Postoperative Instructions:  Follow-up: All patients will see Dr. Howell 4 weeks after surgery.    Activity: Walking plays an important role in your recovery. You are encouraged to begin taking short walks as soon as you are discharged. Increase your distance and time each day. After six weeks your goal should be to walk at least 30 minutes per day.   For all surgeries avoid lifting more than 10 lbs., bending or twisting for 4 weeks. This includes bending and raking. After 4 weeks you may begin to incorporate non-impact aerobic exercise such as a stationary bike or elliptical machine.   Cervical fusion or total disc replacement: You will be given a cervical collar in the surgery center or hospital. For a cervical total disc replacement, wear your soft collar at all times other than bathing or eating for 2 weeks. For a cervical fusion the same applies for 4 weeks. You may move your arms as you feel comfortable.   -If you have a total disc replacement you will be placed on an anti-inflammatory, Indocin for 2 weeks, to help prevent a fusion from forming.   Thoracic or Lumbar fusion: You will be given a lumbar corset in the hospital. Wear this when walking or standing for 4 weeks. You do not have to wear it in bed.   Lumbar discectomy or laminectomy: Typically no brace is required. If you are given a brace you only need to wear when walking or standing until you feel comfortable to go without it.     Wound: Your incision will be closed with absorbable suture that will dissolve. Nothing has to be removed. There will be small pieces of tape over your incision called steri- strips. They can be removed or will fall off generally after 7-10 days. You should change your bandage daily and keep it covered for two weeks. When showering, keep your bandage over your incision. When done showering, remove the bandage, towel off and place a new bandage. No bathtubs or hot tubs for 4 weeks. If you notice redness, drainage or  develop a fever greater than 101 degrees, please notify Dr. Howell.     Diet: If you are having an anterior cervical surgery you should eat soft foods for one week. You should have someone with you when eating.     Narcotic pain medication: These rules apply to every patient. You will be given a prescription when you leave the hospital. Dr. Howell will generally refill this medication one time. Dr. Howell does not fill any narcotic pain medications after the 4 week post-operative appointment. All patients who are given narcotic pain medications are looked up in the [place] Prescription Drug Monitoring Database. Patients may only receive narcotic medications from one prescriber. Please note that the bottle often depicts how you are taking the medications for the few days leaving the hospital. (Example: Take one tablet by mouth every 4 hours as needed. This means take one tablet every 4 hours when you leave the hospital. After a few days you likely will only need this medication every 6-8 hours. You may begin to taper off this when you feel comfortable.)   Please plan ahead if you need a refill as narcotic medications require signed copies that will need to be picked up or mailed to you. Dr. Howell is only in some clinics once a week.   NO NARCOTIC PRESCRIPTIONS WILL BE GIVEN OUT ON FRIDAYS.     Other Medications: You will often be given other medications to help supplement pain.   Anti-inflammatories: Celebrex, Meloxicam, Diclofenac   Muscle Relaxants: Robaxin, Flexeril, Vistaril, Ativan, Valium   Nerve medication: Gabapentin or Lyrica   In most cases we recommend you wean off of the narcotic pain medication first and then wean off of these adjunct medications.   Pre-operative Medications: May be resumed as outlined by the hospital internal medicine doctor or your primary care physician.   Blood thinners: You may resume your blood thinner 7 days after surgery. This includes Aspirin, Coumadin, Warfarin, Xarelto, Plavix,  Pradaxa, Eliquis, and Clopidigrel.   Stool softeners: Narcotic pain medication is constipating. Take stool softeners as instructed until you have weaned off of your narcotic medications.     Preventing lower extremity blood clots: Depending on the surgery and your health, you may be given ALLISON hose stockings. Wear these for 2 weeks. You can remove them for 30 minutes at a time throughout the day. You are encouraged to pump your ankles throughout the day. Walking daily prevents blood clots and is another reason to begin walking shortly after surgery.     Driving: Patients may resume driving when they are off of their narcotic pain medication and feel comfortable. Generally most patients are instructed to avoid driving for at least 10-14 days.   Walking: As seen above walking enhances blood flow, helps activate bowel function and prevents blood clots from forming in your legs. It is crucial to start once you get home and gradually progress each day. If your surgery is in the winter, plan ahead. Consider going to a local community center or mall.   If you feel short of breath or develop chest pain after surgery please call 911.     Answers to common questions:   -When will I feel better? Everyone heals differently. In most cases patients will notice their significant arm or leg pain will go away after a few days, but may have pain over the incision site for a few weeks.   - When will my numbness go away? If you had preoperative numbness or weakness in your arms or legs this can take several weeks to several months to go away. If the numbness or weakness was present for more than a few months before surgery sometimes this can be permanent.   -I have been taking narcotic pain medication for years. Who will prescribe them? Dr. Howell will prescribe narcotic pain medication until four weeks post-operative. If you have a family practitioner or pain management specialist and you feel it will take you more than 4 weeks to wean  off, please have a conversation with your practitioner about post-operative prescribing prior to surgery.

## 2019-09-29 NOTE — PLAN OF CARE
A/OX4,cms and neuro intact.Up independent in room.Dreg changed today.Pain well control with oxycodone.Pt is discharging home today and MD is here to see the pt.Progressing per plan of care.

## 2019-09-29 NOTE — PLAN OF CARE
PT- Attempted to see pt this AM. Pt declined any therapy due to not getting her pain medication this morning.

## 2019-09-29 NOTE — PLAN OF CARE
Patient discharging home. Discharge instructions reviewed with patient and she verbalized understanding. Gave patient her script for oxycodone, norco and flexeril and also her discharge instructions paper. Patient family transporting her home. Patient took all her belongings with her.

## 2019-09-30 ENCOUNTER — TELEPHONE (OUTPATIENT)
Dept: INTERNAL MEDICINE | Facility: CLINIC | Age: 64
End: 2019-09-30

## 2019-09-30 ENCOUNTER — MYC MEDICAL ADVICE (OUTPATIENT)
Dept: INTERNAL MEDICINE | Facility: CLINIC | Age: 64
End: 2019-09-30

## 2019-09-30 DIAGNOSIS — Z92.89 HISTORY OF RECENT HOSPITALIZATION: Primary | ICD-10-CM

## 2019-09-30 NOTE — TELEPHONE ENCOUNTER
"Hospital/TCU/ED for chronic condition Discharge Protocol    \"Hi, my name is Verena Anguiano RN, a registered nurse, and I am calling from [facility].  I am calling to follow up and see how things are going for you after your recent emergency visit/hospital/TCU stay.\"    Tell me how you are doing now that you are home?\" doing ok.      Discharge Instructions    \"Let's review your discharge instructions.  What is/are the follow-up recommendations?  Pt. Response: f/u with primary care provider and take medications as directed.    \"Has an appointment with your primary care provider been scheduled?\"   Yes. (confirm)    \"When you see the provider, I would recommend that you bring your medications with you.\"    Medications    \"Tell me what changed about your medicines when you discharged?\"    Changes to chronic meds?    2 or more - Epic MTM referral needed    \"What questions do you have about your medications?\"    None     New diagnoses of heart failure, COPD, diabetes, or MI?    No              Post Discharge Medication Reconciliation Status: discharge medications reconciled, continue medications without change.--per discharge summary.    Was MTM referral placed (*Make sure to put transitions as reason for referral)?   Yes - \"The Medication Therapy  will call you within the next few days to schedule an appointment with an MTM pharmacist to discuss your medicines and make sure they are working as well as they possibly can for you. This visit can be done in a [place] clinic or on the phone and is at no charge to you.\"    Call Summary    \"What questions or concerns do you have about your recent visit and your follow-up care?\"     none    \"If you have questions or things don't continue to improve, we encourage you contact us through the main clinic number (give number).  Even if the clinic is not open, triage nurses are available 24/7 to help you.     We would like you to know that our clinic has " "extended hours (provide information).  We also have urgent care (provide details on closest location and hours/contact info)\"      \"Thank you for your time and take care!\"             "

## 2019-09-30 NOTE — PLAN OF CARE
Physical Therapy Discharge Summary    Reason for therapy discharge:    Discharged to home.    Progress towards therapy goal(s). See goals on Care Plan in [facility] electronic health record for goal details.  Goals met    Therapy recommendation(s):    Continue home exercise program.

## 2019-09-30 NOTE — TELEPHONE ENCOUNTER
IP F/U    Date: 9-29-19  Diagnosis: back pain, status post lumbar spinal fusion  Is patient active in care coordination? No  Was patient in TCU? No

## 2019-10-02 ENCOUNTER — HEALTH MAINTENANCE LETTER (OUTPATIENT)
Age: 64
End: 2019-10-02

## 2019-10-03 NOTE — DISCHARGE SUMMARY
Discharge Summary    Su Tinoco MRN# [id number]   YOB: 1955 Age: 64 year old     Date of Admission:  9/26/2019  Date of Discharge:  9/29/2019  4:58 PM  Admitting Physician:  Wily Howell MD  Discharge Physician:  Wilbur Howell MD     Primary Provider: Jessica Smith          Admission Diagnoses:   L4-S1 spinal stenosis, L5-S1 spondylolisthesis          Discharge Diagnosis:   Same           Surgical Procedure:   L4-S1 decompression, L5-S1 fusion with intrumentation             Discharge Disposition:   Discharged to home           Medications Prior to Admission:     No medications prior to admission.             Discharge Medications:     No current facility-administered medications for this encounter.      Current Outpatient Medications   Medication Sig     acetaminophen (TYLENOL) 500 MG tablet Take 500-1,000 mg by mouth 2 times daily as needed for mild pain     albuterol (PROAIR HFA) 108 (90 Base) MCG/ACT inhaler Inhale 1-2 puffs into the lungs every 6 hours as needed     Biotin w/ Vitamins C & E (HAIR SKIN & NAILS GUMMIES) 1250-7.5-7.5 MCG-MG-UNT CHEW Take 2 chew tab by mouth daily     budesonide-formoterol (SYMBICORT) 80-4.5 MCG/ACT Inhaler Inhale 2 puffs into the lungs every evening     calcium-vitamin D-vitamin K (VIACTIV) 500-500-40 MG-UNT-MCG CHEW Take 2 tablets by mouth daily     cyclobenzaprine (FLEXERIL) 10 MG tablet Take 1 tablet (10 mg) by mouth 3 times daily as needed for muscle spasms     EPINEPHrine (EPIPEN) 0.3 MG/0.3ML injection Inject 0.3 mLs (0.3 mg) into the muscle once as needed for anaphylaxis     FIBER PO Take 2 tablets by mouth daily (with lunch)      fluticasone (FLONASE) 50 MCG/ACT nasal spray Spray 1 spray into both nostrils daily     gabapentin (NEURONTIN) 300 MG capsule Take 1 capsule (300 mg) by mouth 2 times daily     HYDROcodone-acetaminophen (NORCO) 5-325 MG tablet Take 1-2 tablets by mouth every 4 hours as needed for moderate to severe pain      levocetirizine (XYZAL) 5 MG tablet Take 5 mg by mouth every morning     levothyroxine (SYNTHROID/LEVOTHROID) 88 MCG tablet Take 1 tablet (88 mcg) by mouth daily (Patient taking differently: Take 88 mcg by mouth daily GABBI: Brand Name Synthroid)     magnesium hydroxide (MILK OF MAGNESIA) 400 MG/5ML suspension Take 30 mLs by mouth daily as needed for constipation     melatonin 5 MG tablet Take 10 mg by mouth At Bedtime (5MG X 2 = 10MG)     montelukast (SINGULAIR) 10 MG tablet Take 10 mg by mouth At Bedtime     Multiple Vitamins-Minerals (WOMENS MULTI VITAMIN & MINERAL) TABS Take 1 tablet by mouth daily     oxyCODONE (ROXICODONE) 5 MG tablet Take 1-2 tablets (5-10 mg) by mouth every 3 hours as needed for pain     Probiotic Product (DAILY PROBIOTIC) CAPS Take 1 capsule by mouth daily     senna-docusate (SENOKOT-S/PERICOLACE) 8.6-50 MG tablet Take 1 tablet by mouth 2 times daily     traMADol (ULTRAM) 50 MG tablet Take  mg by mouth 3 times daily as needed for severe pain      traZODone (DESYREL) 100 MG tablet Take 1 tablet (100 mg) by mouth At Bedtime             Consultations:   None       Hospital Course:   Patient made good progress in eating, voiding and ambulating. She had hypotension that improved. She was discharged on POD #3 with . Her hospital course was unremarkable.        Pending Results:   None       Discharge Instructions and Follow-Up:   Avoid lifting or bending for 4 weeks  Wear LSO brace for 4 weeks  Continue knee high greg hose for 2 weeks  Follow up in 4 weeks

## 2019-10-04 ENCOUNTER — ALLIED HEALTH/NURSE VISIT (OUTPATIENT)
Dept: PHARMACY | Facility: CLINIC | Age: 64
End: 2019-10-04
Payer: COMMERCIAL

## 2019-10-04 DIAGNOSIS — K59.03 DRUG-INDUCED CONSTIPATION: ICD-10-CM

## 2019-10-04 DIAGNOSIS — E03.9 ACQUIRED HYPOTHYROIDISM: ICD-10-CM

## 2019-10-04 DIAGNOSIS — G89.18 POSTOPERATIVE PAIN AFTER SPINAL SURGERY: ICD-10-CM

## 2019-10-04 DIAGNOSIS — Z91.018 NUT ALLERGY: ICD-10-CM

## 2019-10-04 DIAGNOSIS — J45.20 MILD INTERMITTENT ASTHMA WITHOUT COMPLICATION: Primary | ICD-10-CM

## 2019-10-04 DIAGNOSIS — Z78.9 TAKES DIETARY SUPPLEMENTS: ICD-10-CM

## 2019-10-04 DIAGNOSIS — G47.00 INSOMNIA, UNSPECIFIED TYPE: ICD-10-CM

## 2019-10-04 DIAGNOSIS — J30.9 ALLERGIC RHINITIS, UNSPECIFIED SEASONALITY, UNSPECIFIED TRIGGER: ICD-10-CM

## 2019-10-04 PROCEDURE — 99607 MTMS BY PHARM ADDL 15 MIN: CPT | Performed by: PHARMACIST

## 2019-10-04 PROCEDURE — 99605 MTMS BY PHARM NP 15 MIN: CPT | Performed by: PHARMACIST

## 2019-10-04 RX ORDER — PREDNISONE 10 MG/1
10 TABLET ORAL DAILY
COMMUNITY
Start: 2019-10-01 | End: 2019-10-08

## 2019-10-04 NOTE — PROGRESS NOTES
"SUBJECTIVE/OBJECTIVE:                Su Tinoco is a 64 year old female called for a transitions of care visit.  She was discharged from [facility] ([facility]) on 9/29/19 (admitted 9/26-9/29) for lumbar spinal fusion surgical procedure.     Medication changes (from discharge summary):  None noted    Chief Complaint: Care transitions and medication review.  No particular concerns from patient today.    Allergies/ADRs: Reviewed in Epic  Tobacco: No tobacco use   Alcohol: Less than 1 beverages / week  Caffeine: 2-3 cups/day of coffee, 1 sodas/day \"Natural soda\"  Activity: no routine says that she has not been able to exercise in about 6 months.  PMH: Reviewed in Epic    Medication Adherence/Access:  Patient takes medications directly from bottles.  Patient takes medications 4 time(s) per day. Morning, lunchtime, dinner and before bed.  Per patient, misses medication 0 times per week.   Medication barriers: none.   The patient fills medications at [place]: NO, fills medications at [facility] in [place].    Post-surgical Pain: Patient has significant history of L4-S1 spinal stenosis and L5- S1 spondylolisthesis who underwent spinal surgical procedure L4-S1 decompression and L5-S1 fusion while inpatient.  Pain is described as: back pain and leg cramping - says that they told her at [facility] that she had a \"weird nerve thing\" in her leg likely secondary to the surgery. Are you able to do your normal activities? Pain keeps me from doing most of what I need to do.  Are you able to sleep? Awake with pain and leg cramping multiple times/night. Patient reports the following side effects:  First denies side effects, when asked about fatigue states \"I just had a big surgery that is making me tired\" and then throughout the encounter says that she is starting to get really tired.  Current Meds:  Oxycodone 5 mg tab: prescribed as: 1-2 tabs every 3 hours as needed for pain. Patient taking 1 tablet around " "the clock every 3 hours (left leg pain now), with the exception of sometimes in the night if she is sleeping it may be a little more than 3 hours. Has also been icing.  Acetaminophen 500 mg: prescribed as: 1-2 tabs twice daily as needed for mild pain. Patient takin mg/day; 1000 mg AM and PM.  Gabapentin 300 mg: twice daily; per patient has been granted (by her orthopedic provider at [facility]) to take up to three times daily because of the new leg pain.  Cyclobenzaprine (Flexeril) 10 m tab three times daily as needed for muscle spasms; Pt reports taking scheduled three times daily.  Was prescribed prednisone 5 mg by Dominga with [facility] for the \"weird nerve thing\" in her leg, described further as cramping. Reports these instructions: 4 tabs/day x 3 days, 3 tabs daily x 3 days, 2 daily for 3 days, and 1 daily for 3 days and stop.  Is taking this with food and spreading out the doses, just finishing the 4th day of this medication.  On medication list, not currently taking:  Tramadol 50 m-2 tabs by mouth three times daily as needed for severe pain. Was on this before surgery, but not taking right now since taking oxycodone.  Hydrocodone-acetaminophen 5-325 mg: prescribed as take 1-2 tabs by mouth every 4 hours as needed for moderate to severe pain daily; Not taking right now - has the prescription, but has not filled it yet. States that she will fill this when she runs out of oxycodone if she still needs pain control.    Allergic Rhinitis:  Current medications include   fluticasone nasal spray 50 mc spray(s) in each nostril once daily and Xyzal (levocetirzine) 5 mg: once daily and Montelukast 10 mg: at bedtime daily  Primary symptoms are none since she is taking medication..  Lifestyle concerns include  pets at home. Pt feels that current therapy is effective.  Pt follows with Dr. James at [facility].    Asthma:  Pt follows with Dr. James at [facility].  Current asthma medications:   Albuterol (Pt reports using albuterol 1-2 times per month).  ICS/LABA- Symbicort 80-4.5 mc puff(s) once daily in the evening (pt reports that she is using just one puff daily, but this controls her symptoms well) was given this as a sample by her provider in clinic; Dulera - will be starting the Dulera (pt does not know dose) after she finished the Symbicort sample since her insurance prefers dulera.    Asthma triggers include: dust mites, animal dander and grass. Has dogs in the home.  Pt reports the following symptoms: shortness of breath occasionally that is relieved by albuterol.  Feels that she is under good control with the symbicort.  AAP on file: UNKNOWN  ACT Total Scores 4/3/2018 2018 2019   ACT TOTAL SCORE - - -   ASTHMA ER VISITS - - -   ASTHMA HOSPITALIZATIONS - - -   ACT TOTAL SCORE (Goal Greater than or Equal to 20) 25 25 25   In the past 12 months, how many times did you visit the emergency room for your asthma without being admitted to the hospital? 0 0 0   In the past 12 months, how many times were you hospitalized overnight because of your asthma? 0 0 0     Constipation: Pt's last BM was yesterday.  States that it is really painful to go and she needs to change something here to help her have regular, painless BMs.  Senna-docusate 8.6-50 mg tab: taking 1 tab twice daily.  Colace (docusate) 100 mg: taking 1 tab twice daily - just bought this last night and started it.  Dulcolax 5 mg: took 2 tabs last evening since it was so painful to have a BM - just started this last evening.  Miralax (PEG powder): 17 grams (1 capful) mixed in liquid once daily.  Milk of Magnesia (400 mg/5mL) suspension - pt not taking, removed from medication list.  Fiber PO tablets: 2 tabs by mouth twice daily (4 tabs/day).    Insomnia:  Current medications include: trazodone 100 mg: at bedtime and melatonin 5 mg: 10 mg (2 tabs) at bedtime. Notes that she is getting tired  during the visit today (See pain medication side effect comments above).    Hypothyroidism:  Patient is taking synthroid 88 mcg daily. Patient is having the following symptoms: none. No concerns.  TSH   Date Value Ref Range Status   2019 0.60 0.40 - 4.00 mU/L Final     Nut allergy:  Epi-pen: Has available for if needed.  States that she recently had an anaphylactic reaction from Hawaiian pizza because the sauce was Venezuelan and had certain nuts in it that she was allergic to (peanut / nara nut).  Reports that now she has a new pen and it is not .    Vitamins/Supplements:  Probiotic: once daily  Multivitamin: once daily  Calcium-Vitamin D-Vitamin K 500-500-40 mg-units-mc tabs daily  Biotin with vitamins C & E (hair, skin and nails gummies) 1250-7.5-7.5 mcg-mg-unit chew: once daily    Today's Vitals: LMP 2007  Telemedicine.  Estimated Creatinine Clearance: 81 mL/min (based on SCr of 0.64 mg/dL).    ASSESSMENT:                 Current medications were reviewed today.      Medication Adherence: good, no issues identified    Post-surgical Pain: Needs improvement - undesirable effect: daytime fatigue- likely due to multiple medications that may increase drowsiness (oxycodone, cyclobenzaprine, gabapentin)  recommend decreasing cyclobenzaprine use as tolerated since prescribed as needed. Plan in place to follow-up with ortho post-surgery.    Allergic rhinitis: Stable    Asthma: Needs further assessment - recommend continued monitoring with the change to dulera to determine appropriate regimen. Pt follows with Dr. James and allergy and asthma clinic.  With last ACT score of 25/25, patient's asthma is at ACT goal score of at least 20/25.    Constipation: Needs improvement - recommend increasing senna-s use and stop colace (since this product dose not contain senna).  Max senna-s dose is 4 tabs twice daily, so recommend tapering up until regular BM with dulcolax use as needed to keep bowels  regular.    Insomnia: Stable at this time; recommend continued assessment since patient is likely having increase daytime sleepiness from pain medications and therefore may interfere with nighttime sleeping patterns.    Hypothyroidism: Stable    Nut allergy: Stable    Vitamins/Supplements: Stable    PLAN:                  Post Discharge Medication Reconciliation Status: discharge medications reconciled and changed, per note/orders (see AVS).    1. Increase the docusate 50 mg-senna 8.6 mg tab to 2 tabs twice daily.  You can increase this to 3 tabs twice daily and then 4 tabs twice daily if needed.  The maximum you can take is 4 tabs twice daily.  It is okay to continue to use the dulcolax as needed as well.  2. If the cyclobenzaprine is making you tired during the day, you can try taking this less frequently.  It is prescribed to take 3 times daily as needed (which would be every 8 hours), so you could try taking it twice daily or spreading it out more than 8 hours between doses.    (ortho) [facility] appt on Monday Octboer 13th.    I spent 45 minutes with this patient today. I offer these suggestions with the understanding that I don't fully understand Su's past medical history and the complexity of her health conditions. Su should make no changes without the approval of her physician, with the exception of the OTC recommendation on senna-s and dulcolax provided today, and the education provided on medication instructions. A copy of the visit note was provided to the patient's primary care and ortho provider.    Will follow up with MTM as needed.    The patient was sent via [employer] a summary of these recommendations as an after visit summary.    Ritu Fried, Bing  [facility] Medication Therapy Management (MTM) Pharmacist  Pager: [phone] - please leave a voicemail with your name + callback number.

## 2019-10-04 NOTE — Clinical Note
Travis Smith,I spoke with Su on the phone for a Medication Therapy Management (MTM) appointment since hospital discharge.We discussed 2 main med changes:- starting to decrease use of prn cyclobenzaprine - causing additive drowsiness.- increasing OTC senna-s use with continued constipation.Thanks for allowing me to participate in Su's care. Please see attached note for details.Ritu Fried, [place] Medication Therapy Management (MTM) PharmacistPager: [phone] - please leave a voicemail with your name + callback number.

## 2019-10-08 ENCOUNTER — OFFICE VISIT (OUTPATIENT)
Dept: INTERNAL MEDICINE | Facility: CLINIC | Age: 64
End: 2019-10-08
Payer: COMMERCIAL

## 2019-10-08 VITALS
RESPIRATION RATE: 16 BRPM | DIASTOLIC BLOOD PRESSURE: 77 MMHG | SYSTOLIC BLOOD PRESSURE: 153 MMHG | OXYGEN SATURATION: 99 % | TEMPERATURE: 98.6 F

## 2019-10-08 DIAGNOSIS — M48.061 SPINAL STENOSIS OF LUMBAR REGION, UNSPECIFIED WHETHER NEUROGENIC CLAUDICATION PRESENT: ICD-10-CM

## 2019-10-08 DIAGNOSIS — M81.0 OSTEOPOROSIS WITHOUT CURRENT PATHOLOGICAL FRACTURE, UNSPECIFIED OSTEOPOROSIS TYPE: Primary | ICD-10-CM

## 2019-10-08 PROCEDURE — 99495 TRANSJ CARE MGMT MOD F2F 14D: CPT | Performed by: INTERNAL MEDICINE

## 2019-10-08 RX ORDER — MULTIVIT-MIN/IRON/FOLIC ACID/K 18-600-40
1000 CAPSULE ORAL DAILY
COMMUNITY
End: 2024-06-20

## 2019-10-08 RX ORDER — BISACODYL 5 MG/1
5 TABLET, DELAYED RELEASE ORAL DAILY PRN
COMMUNITY
End: 2020-05-12

## 2019-10-08 RX ORDER — ALENDRONATE SODIUM 70 MG/1
70 TABLET ORAL
Qty: 4 TABLET | Refills: 11 | Status: SHIPPED | OUTPATIENT
Start: 2019-10-08 | End: 2020-09-14

## 2019-10-08 NOTE — PROGRESS NOTES
"Subjective     Su Tinoco is a 64 year old female who presents to clinic today for the following health issues:    [facility]       Hospital Follow-up Visit:    Hospital/Nursing Home/IP Rehab Facility: [facility]  Date of Admission: 09/26/2019  Date of Discharge: 09/29/2019  Reason(s) for Admission: Spinal Stenosis, post lumbar fusion            Problems taking medications regularly:  None       Medication changes since discharge: None       Problems adhering to non-medication therapy:  None    Summary of hospitalization:  [facility] discharge summary reviewed  Diagnostic Tests/Treatments reviewed.  Follow up needed: none  Other Healthcare Providers Involved in Patient s Care:         Specialist appointment - surgoen  Update since discharge: improved.     Her surgeon wanted her to meet with me to discuss possible treatment for low bone density.  In the surgery, he felt that her spinal bones seemed \"soft\" and was concerned about the possibility of osteoporosis.  Her last bone density test showed osteopenia with a lumbar T score of -2.0 however there were significant degenerative changes present.      Post Discharge Medication Reconciliation: discharge medications reconciled and changed, per note/orders (see AVS).  Plan of care communicated with patient     Coding guidelines for this visit:  Type of Medical   Decision Making Face-to-Face Visit       within 7 Days of discharge Face-to-Face Visit        within 14 days of discharge   Moderate Complexity [id number]   High Complexity [id number]            Patient Active Problem List   Diagnosis     Allergic rhinitis     Hypothyroidism     Migraine     Mild intermittent asthma     CARDIOVASCULAR SCREENING; LDL GOAL LESS THAN 160     Spinal stenosis of lumbar region     Spondylolisthesis at L5-S1 level     Controlled substance agreement signed     Collagenous colitis     Status post lumbar spinal fusion     Current Outpatient Medications   Medication " Sig Dispense Refill     acetaminophen (TYLENOL) 500 MG tablet Take 1,000 mg by mouth 2 times daily as needed for mild pain        albuterol (PROAIR HFA) 108 (90 Base) MCG/ACT inhaler Inhale 1-2 puffs into the lungs every 6 hours as needed 2 Inhaler 11     Biotin w/ Vitamins C & E (HAIR SKIN & NAILS GUMMIES) 1250-7.5-7.5 MCG-MG-UNT CHEW Take 2 chew tab by mouth daily       bisacodyl (DULCOLAX) 5 MG EC tablet Take 5 mg by mouth daily as needed for constipation       budesonide-formoterol (SYMBICORT) 80-4.5 MCG/ACT Inhaler Inhale 2 puffs into the lungs every evening Note: as of 10/4/19 patient reports that she will be switching to Dulera (because her insurance prefers this) once she finished the symbicort sample.       Calcium Citrate-Vitamin D (CITRACAL MAXIMUM PO) Take 1,200 Units by mouth 2 times daily       calcium-vitamin D-vitamin K (VIACTIV) 500-500-40 MG-UNT-MCG CHEW Take 2 tablets by mouth daily       cyclobenzaprine (FLEXERIL) 10 MG tablet Take 1 tablet (10 mg) by mouth 3 times daily as needed for muscle spasms 30 tablet 0     EPINEPHrine (EPIPEN) 0.3 MG/0.3ML injection Inject 0.3 mLs (0.3 mg) into the muscle once as needed for anaphylaxis 0.6 mL 11     FIBER PO Take 2 tablets by mouth 2 times daily        fluticasone (FLONASE) 50 MCG/ACT nasal spray Spray 1 spray into both nostrils daily       gabapentin (NEURONTIN) 300 MG capsule Take 1 capsule (300 mg) by mouth 2 times daily (Patient taking differently: Take 300 mg by mouth 3 times daily ) 60 capsule 0     HYDROcodone-acetaminophen (NORCO) 5-325 MG tablet Take 1-2 tablets by mouth every 4 hours as needed for moderate to severe pain 50 tablet 0     levocetirizine (XYZAL) 5 MG tablet Take 5 mg by mouth every morning       levothyroxine (SYNTHROID/LEVOTHROID) 88 MCG tablet Take 1 tablet (88 mcg) by mouth daily (Patient taking differently: Take 88 mcg by mouth daily GABBI: Brand Name Synthroid) 90 tablet 3     melatonin 5 MG tablet Take 10 mg by mouth At Bedtime  (5MG X 2 = 10MG)       montelukast (SINGULAIR) 10 MG tablet Take 10 mg by mouth At Bedtime       Multiple Vitamins-Minerals (WOMENS MULTI VITAMIN & MINERAL) TABS Take 1 tablet by mouth daily       Probiotic Product (DAILY PROBIOTIC) CAPS Take 1 capsule by mouth daily       senna-docusate (SENOKOT-S/PERICOLACE) 8.6-50 MG tablet Take 1 tablet by mouth 2 times daily 50 tablet 0     traZODone (DESYREL) 100 MG tablet Take 1 tablet (100 mg) by mouth At Bedtime 90 tablet 3     Vitamin D, Cholecalciferol, 1000 units TABS Take 1,000 Units by mouth daily        Social History     Tobacco Use     Smoking status: Never Smoker     Smokeless tobacco: Never Used   Substance Use Topics     Alcohol use: Yes     Comment: rarely- once a month     Drug use: No      Reviewed and updated as needed this visit by Provider         Review of Systems   Negative for fever, chills, pain is fairly well controlled, there were no other complications related to surgery and no other acute concerns.      Objective    BP (!) 153/77   Temp 98.6  F (37  C) (Oral)   Resp 16   LMP 12/01/2007   SpO2 99%   There is no height or weight on file to calculate BMI.  Physical Exam     Not examined          Assessment & Plan     1. Osteoporosis without current pathological fracture, unspecified osteoporosis type  Discussed with her the results of the bone density test and the difficulties with interpretation because of the degenerative changes.  Given the observations of the surgeon and that she has low bone density in the spine, I would agree with plan for treatment.  We discussed medications, potential side effects, long-term risk, drug holidays, calcium and vitamin D.  Start on Fosamax, would recheck bone density in 2 years.  - alendronate (FOSAMAX) 70 MG tablet; Take 1 tablet (70 mg) by mouth every 7 days  Dispense: 4 tablet; Refill: 11    2. Spinal stenosis of lumbar region, unspecified whether neurogenic claudication present  She is doing well  postoperatively, continue follow-up with the surgeon as planned.      No follow-ups on file.    Jessica Smith MD  [facility]

## 2019-10-08 NOTE — NURSING NOTE
"Vital signs:  Temp: 98.6  F (37  C) Temp src: Oral BP: (!) 153/77     Resp: 16 SpO2: 99 %     Height: (decl) Weight: (decline)  Estimated body mass index is 25.31 kg/m  as calculated from the following:    Height as of 9/26/19: 1.588 m (5' 2.5\").    Weight as of 9/28/19: 63.8 kg (140 lb 9.6 oz).        "

## 2019-10-14 ENCOUNTER — TRANSFERRED RECORDS (OUTPATIENT)
Dept: HEALTH INFORMATION MANAGEMENT | Facility: CLINIC | Age: 64
End: 2019-10-14

## 2019-11-01 DIAGNOSIS — M81.0 OSTEOPOROSIS WITHOUT CURRENT PATHOLOGICAL FRACTURE, UNSPECIFIED OSTEOPOROSIS TYPE: ICD-10-CM

## 2019-11-01 RX ORDER — ALENDRONATE SODIUM 70 MG/1
70 TABLET ORAL
Qty: 4 TABLET | Refills: 11 | OUTPATIENT
Start: 2019-11-01

## 2019-11-01 NOTE — TELEPHONE ENCOUNTER
Medication refused, prescription refilled at office visit with primary care provider on 10/8/19.

## 2019-11-01 NOTE — TELEPHONE ENCOUNTER
"Requested Prescriptions   Pending Prescriptions Disp Refills     alendronate (FOSAMAX) 70 MG tablet  Last Written Prescription Date:  10/08/19  Last Fill Quantity: 4,  # refills: 11   Last office visit: 10/8/2019 with prescribing provider:  10/08/19   Future Office Visit:     4 tablet 11     Sig: Take 1 tablet (70 mg) by mouth every 7 days       Bisphosphonates Passed - 11/1/2019  9:23 AM        Passed - Recent (12 mo) or future (30 days) visit within the authorizing provider's specialty     Patient has had an office visit with the authorizing provider or a provider within the authorizing providers department within the previous 12 mos or has a future within next 30 days. See \"Patient Info\" tab in inbasket, or \"Choose Columns\" in Meds & Orders section of the refill encounter.              Passed - Dexa on file within past 2 years     Please review last Dexa result.           Passed - Medication is active on med list        Passed - Patient is age 18 or older        Passed - Normal serum creatinine on file within past 12 months     Recent Labs   Lab Test 09/29/19  0748   CR 0.64               "

## 2019-12-16 ENCOUNTER — TRANSFERRED RECORDS (OUTPATIENT)
Dept: HEALTH INFORMATION MANAGEMENT | Facility: CLINIC | Age: 64
End: 2019-12-16

## 2020-01-14 ENCOUNTER — TRANSFERRED RECORDS (OUTPATIENT)
Dept: HEALTH INFORMATION MANAGEMENT | Facility: CLINIC | Age: 65
End: 2020-01-14

## 2020-02-07 ENCOUNTER — OFFICE VISIT (OUTPATIENT)
Dept: INTERNAL MEDICINE | Facility: CLINIC | Age: 65
End: 2020-02-07
Payer: COMMERCIAL

## 2020-02-07 VITALS
HEIGHT: 61 IN | TEMPERATURE: 97.3 F | SYSTOLIC BLOOD PRESSURE: 126 MMHG | HEART RATE: 73 BPM | OXYGEN SATURATION: 100 % | RESPIRATION RATE: 16 BRPM | WEIGHT: 157.1 LBS | DIASTOLIC BLOOD PRESSURE: 74 MMHG | BODY MASS INDEX: 29.66 KG/M2

## 2020-02-07 DIAGNOSIS — F41.9 ANXIETY: Primary | ICD-10-CM

## 2020-02-07 DIAGNOSIS — M85.80 OSTEOPENIA, UNSPECIFIED LOCATION: ICD-10-CM

## 2020-02-07 PROCEDURE — 99213 OFFICE O/P EST LOW 20 MIN: CPT | Performed by: INTERNAL MEDICINE

## 2020-02-07 ASSESSMENT — PATIENT HEALTH QUESTIONNAIRE - PHQ9
5. POOR APPETITE OR OVEREATING: NEARLY EVERY DAY
5. POOR APPETITE OR OVEREATING: NEARLY EVERY DAY
SUM OF ALL RESPONSES TO PHQ QUESTIONS 1-9: 6

## 2020-02-07 ASSESSMENT — ANXIETY QUESTIONNAIRES
5. BEING SO RESTLESS THAT IT IS HARD TO SIT STILL: NEARLY EVERY DAY
GAD7 TOTAL SCORE: 15
5. BEING SO RESTLESS THAT IT IS HARD TO SIT STILL: NEARLY EVERY DAY
2. NOT BEING ABLE TO STOP OR CONTROL WORRYING: NEARLY EVERY DAY
6. BECOMING EASILY ANNOYED OR IRRITABLE: MORE THAN HALF THE DAYS
1. FEELING NERVOUS, ANXIOUS, OR ON EDGE: MORE THAN HALF THE DAYS
6. BECOMING EASILY ANNOYED OR IRRITABLE: MORE THAN HALF THE DAYS
7. FEELING AFRAID AS IF SOMETHING AWFUL MIGHT HAPPEN: NOT AT ALL
3. WORRYING TOO MUCH ABOUT DIFFERENT THINGS: MORE THAN HALF THE DAYS
7. FEELING AFRAID AS IF SOMETHING AWFUL MIGHT HAPPEN: NOT AT ALL
3. WORRYING TOO MUCH ABOUT DIFFERENT THINGS: MORE THAN HALF THE DAYS
1. FEELING NERVOUS, ANXIOUS, OR ON EDGE: MORE THAN HALF THE DAYS

## 2020-02-07 ASSESSMENT — MIFFLIN-ST. JEOR: SCORE: 1213.94

## 2020-02-07 NOTE — PROGRESS NOTES
Subjective     Su Tinoco is a 64 year old female who presents to clinic today for the following health issues:    HPI   1.  Anxiety: She reports she has been having some gradually increasing anxiety for several months.  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  She has decreased that to 2/month but is considering stopping it if she feels that is more than what she can handle.  There is some family issues with elderly parent, and some other concerns.  She has been sleeping less, feeling more overwhelmed.  She really is not feeling depressed.  She feels she would like to try sertraline.  She had been on Wellbutrin but raised her blood pressure.  She is not been on sertraline before.  She is working on finding a counselor, she wants to work with somebody that is not familiar with her through her work and think she will be seeing somebody in [place].     2.  She complains of some GI upset she thinks is related to the Fosamax.  She tends to feel a little discomfort and nausea, ends of eating a little bit of crackers which seems to help settle it down but she is concerned she is eating more than she should and has gained some weight.  She does not have any other side effects from it.  She has cut down her calcium a little as she thought that was bothering her as well but it has not completely resolved the symptoms.    .      Patient Active Problem List   Diagnosis     Allergic rhinitis     Hypothyroidism     Migraine     Mild intermittent asthma     CARDIOVASCULAR SCREENING; LDL GOAL LESS THAN 160     Spinal stenosis of lumbar region     Spondylolisthesis at L5-S1 level     Controlled substance agreement signed     Collagenous colitis     Status post lumbar spinal fusion     Current Outpatient Medications   Medication Sig Dispense Refill     albuterol (PROAIR HFA) 108 (90 Base) MCG/ACT inhaler Inhale 1-2 puffs into the lungs every 6 hours as needed 2  Inhaler 11     alendronate (FOSAMAX) 70 MG tablet Take 1 tablet (70 mg) by mouth every 7 days 4 tablet 11     Biotin w/ Vitamins C & E (HAIR SKIN & NAILS GUMMIES) 1250-7.5-7.5 MCG-MG-UNT CHEW Take 2 chew tab by mouth daily       Calcium Citrate-Vitamin D (CITRACAL MAXIMUM PO) Take 1,200 Units by mouth 2 times daily       EPINEPHrine (EPIPEN) 0.3 MG/0.3ML injection Inject 0.3 mLs (0.3 mg) into the muscle once as needed for anaphylaxis 0.6 mL 11     fluticasone (FLONASE) 50 MCG/ACT nasal spray Spray 1 spray into both nostrils daily       levocetirizine (XYZAL) 5 MG tablet Take 5 mg by mouth every morning       levothyroxine (SYNTHROID/LEVOTHROID) 88 MCG tablet Take 1 tablet (88 mcg) by mouth daily (Patient taking differently: Take 88 mcg by mouth daily GABBI: Brand Name Synthroid) 90 tablet 3     melatonin 5 MG tablet Take 10 mg by mouth At Bedtime (5MG X 2 = 10MG)       mometasone-formoterol (DULERA) 200-5 MCG/ACT inhaler Take 1 Act by mouth daily       montelukast (SINGULAIR) 10 MG tablet Take 10 mg by mouth At Bedtime       Multiple Vitamins-Minerals (WOMENS MULTI VITAMIN & MINERAL) TABS Take 1 tablet by mouth daily       Probiotic Product (DAILY PROBIOTIC) CAPS Take 1 capsule by mouth daily       traZODone (DESYREL) 100 MG tablet Take 1 tablet (100 mg) by mouth At Bedtime 90 tablet 3     Vitamin D, Cholecalciferol, 1000 units TABS Take 1,000 Units by mouth daily       acetaminophen (TYLENOL) 500 MG tablet Take 1,000 mg by mouth 2 times daily as needed for mild pain        bisacodyl (DULCOLAX) 5 MG EC tablet Take 5 mg by mouth daily as needed for constipation       budesonide-formoterol (SYMBICORT) 80-4.5 MCG/ACT Inhaler Inhale 2 puffs into the lungs every evening        calcium-vitamin D-vitamin K (VIACTIV) 500-500-40 MG-UNT-MCG CHEW Take 2 tablets by mouth daily       cyclobenzaprine (FLEXERIL) 10 MG tablet Take 1 tablet (10 mg) by mouth 3 times daily as needed for muscle spasms 30 tablet 0     FIBER PO Take 2  "tablets by mouth 2 times daily        gabapentin (NEURONTIN) 300 MG capsule Take 1 capsule (300 mg) by mouth 2 times daily (Patient taking differently: Take 300 mg by mouth 3 times daily ) 60 capsule 0     HYDROcodone-acetaminophen (NORCO) 5-325 MG tablet Take 1-2 tablets by mouth every 4 hours as needed for moderate to severe pain 50 tablet 0     senna-docusate (SENOKOT-S/PERICOLACE) 8.6-50 MG tablet Take 1 tablet by mouth 2 times daily 50 tablet 0      Social History     Tobacco Use     Smoking status: Never Smoker     Smokeless tobacco: Never Used   Substance Use Topics     Alcohol use: Yes     Comment: rarely- once a month     Drug use: No          Reviewed and updated as needed this visit by Provider         Review of Systems   n no vomiting, no true abdominal pain      Objective    /74 (BP Location: Left arm, Patient Position: Sitting, Cuff Size: Adult Regular)   Pulse 73   Temp 97.3  F (36.3  C) (Oral)   Resp 16   Ht 1.556 m (5' 1.25\")   Wt 71.3 kg (157 lb 1.6 oz)   LMP 12/01/2007   SpO2 100%   BMI 29.44 kg/m    Body mass index is 29.44 kg/m .  Physical Exam     PHQ-9 SCORE 2/7/2020   PHQ-9 Total Score 6     BRONWYN-7 SCORE 9/14/2017 11/12/2018 2/7/2020   Total Score - - -   Total Score 0 (minimal anxiety) 1 (minimal anxiety) -   Total Score 0 1 15               Assessment & Plan     1. Anxiety  Some increase in symptoms, a lot of it is stress related.  We discussed workbook she can try, strongly encouraged her to follow through with a counselor.  In the meantime can start on sertraline.  Advised to start at 25 mg and then try 50.  Advised that the dose may need to go up from there to be effective but can give it a couple weeks at the 50 mg dose.  Call for significant bothersome side effects.  - sertraline (ZOLOFT) 50 MG tablet; 25 mg daily x7 days, then 50 mg daily  Dispense: 30 tablet; Refill: 1    2. Osteopenia, unspecified location  Discussed medication options, suggest that she could check to " see if insurance covers monthly Actonel or Boniva as they are less likely to cause GI upset.  If she finds 1 of them is covered she can contact me.  I would recommend stopping Fosamax for couple weeks before starting a new medication to be sure that any stomach irritation has healed prior to starting a new medication.      No follow-ups on file.    Jessica Smith MD  [facility]

## 2020-02-08 ASSESSMENT — ASTHMA QUESTIONNAIRES: ACT_TOTALSCORE: 25

## 2020-02-08 ASSESSMENT — ANXIETY QUESTIONNAIRES: GAD7 TOTAL SCORE: 15

## 2020-03-02 ENCOUNTER — MYC MEDICAL ADVICE (OUTPATIENT)
Dept: INTERNAL MEDICINE | Facility: CLINIC | Age: 65
End: 2020-03-02

## 2020-03-02 DIAGNOSIS — F41.9 ANXIETY: Primary | ICD-10-CM

## 2020-03-02 RX ORDER — SERTRALINE HYDROCHLORIDE 100 MG/1
100 TABLET, FILM COATED ORAL DAILY
Qty: 30 TABLET | Refills: 1 | Status: SHIPPED | OUTPATIENT
Start: 2020-03-02 | End: 2020-04-15

## 2020-03-02 NOTE — TELEPHONE ENCOUNTER
Please see message below and advise. Patient reports no improvement in symptoms from Zoloft, wondering if she can increase her dose in the near future.

## 2020-03-03 NOTE — TELEPHONE ENCOUNTER
Provider sent a myc message to the patient and increased her dosage as requested by patient.    Patient sent a myc message back to provider.

## 2020-03-17 ENCOUNTER — MYC MEDICAL ADVICE (OUTPATIENT)
Dept: INTERNAL MEDICINE | Facility: CLINIC | Age: 65
End: 2020-03-17

## 2020-03-29 DIAGNOSIS — J45.20 MILD INTERMITTENT ASTHMA, UNCOMPLICATED: ICD-10-CM

## 2020-03-30 RX ORDER — ALBUTEROL SULFATE 90 UG/1
AEROSOL, METERED RESPIRATORY (INHALATION)
Qty: 36 G | Refills: 0 | Status: SHIPPED | OUTPATIENT
Start: 2020-03-30 | End: 2022-03-03

## 2020-03-30 NOTE — TELEPHONE ENCOUNTER
"Requested Prescriptions   Pending Prescriptions Disp Refills     VENTOLIN  (90 Base) MCG/ACT inhaler [Pharmacy Med Name: VENTOLIN HFA INH W/DOS CTR 200PUFFS] 36 g      Sig: INHALE 1 TO 2 PUFFS INTO THE LUNGS EVERY 6 HOURS AS NEEDED   Last Written Prescription Date:  03/05/2019  Last Fill Quantity: 2 Inhaler,  # refills: 11   Last office visit: 2/7/2020 with prescribing provider:     Future Office Visit:      Asthma Maintenance Inhalers - Anticholinergics Passed - 3/29/2020 10:06 AM        Passed - Patient is age 12 years or older        Passed - Asthma control assessment score within normal limits in last 6 months     Please review ACT score.           Passed - Medication is active on med list        Passed - Recent (6 mo) or future (30 days) visit within the authorizing provider's specialty     Patient had office visit in the last 6 months or has a visit in the next 30 days with authorizing provider or within the authorizing provider's specialty.  See \"Patient Info\" tab in inSpringshotsket, or \"Choose Columns\" in Meds & Orders section of the refill encounter.           Short-Acting Beta Agonist Inhalers Protocol  Passed - 3/29/2020 10:06 AM        Passed - Patient is age 12 or older        Passed - Asthma control assessment score within normal limits in last 6 months     Please review ACT score.           Passed - Medication is active on med list        Passed - Recent (6 mo) or future (30 days) visit within the authorizing provider's specialty     Patient had office visit in the last 6 months or has a visit in the next 30 days with authorizing provider or within the authorizing provider's specialty.  See \"Patient Info\" tab in inbasket, or \"Choose Columns\" in Meds & Orders section of the refill encounter.               "

## 2020-03-30 NOTE — TELEPHONE ENCOUNTER
"Requested Prescriptions   Pending Prescriptions Disp Refills     VENTOLIN  (90 Base) MCG/ACT inhaler [Pharmacy Med Name: VENTOLIN HFA INH W/DOS CTR 200PUFFS] 36 g      Sig: INHALE 1 TO 2 PUFFS INTO THE LUNGS EVERY 6 HOURS AS NEEDED       Asthma Maintenance Inhalers - Anticholinergics Passed - 3/30/2020 10:33 AM        Passed - Patient is age 12 years or older        Passed - Asthma control assessment score within normal limits in last 6 months     Please review ACT score.           Passed - Medication is active on med list        Passed - Recent (6 mo) or future (30 days) visit within the authorizing provider's specialty     Patient had office visit in the last 6 months or has a visit in the next 30 days with authorizing provider or within the authorizing provider's specialty.  See \"Patient Info\" tab in inbasket, or \"Choose Columns\" in Meds & Orders section of the refill encounter.           Short-Acting Beta Agonist Inhalers Protocol  Passed - 3/30/2020 10:33 AM        Passed - Patient is age 12 or older        Passed - Asthma control assessment score within normal limits in last 6 months     Please review ACT score.           Passed - Medication is active on med list        Passed - Recent (6 mo) or future (30 days) visit within the authorizing provider's specialty     Patient had office visit in the last 6 months or has a visit in the next 30 days with authorizing provider or within the authorizing provider's specialty.  See \"Patient Info\" tab in inbasket, or \"Choose Columns\" in Meds & Orders section of the refill encounter.               Rx approved per [facility] protocol.      Claire Pineda RN BSN, Pap Tracking    "

## 2020-04-15 ENCOUNTER — MYC MEDICAL ADVICE (OUTPATIENT)
Dept: INTERNAL MEDICINE | Facility: CLINIC | Age: 65
End: 2020-04-15

## 2020-04-15 DIAGNOSIS — F41.9 ANXIETY: ICD-10-CM

## 2020-04-15 RX ORDER — SERTRALINE HYDROCHLORIDE 100 MG/1
100 TABLET, FILM COATED ORAL DAILY
Qty: 90 TABLET | Refills: 1 | Status: SHIPPED | OUTPATIENT
Start: 2020-04-15 | End: 2020-11-02

## 2020-04-15 NOTE — TELEPHONE ENCOUNTER
"[facility]  \"Increase the dose to 100 mg.  I will send a prescription for the 100 mg tablets.  Update me after about 3 weeks on this dose\"    Please see message below and advise. Patient sent in update, requesting a 90 day supply of medication. Medication pended.   "

## 2020-04-15 NOTE — TELEPHONE ENCOUNTER
"Requested Prescriptions   Pending Prescriptions Disp Refills     sertraline (ZOLOFT) 100 MG tablet 30 tablet 1     Sig: Take 1 tablet (100 mg) by mouth daily       SSRIs Protocol Passed - 4/15/2020 12:07 PM        Passed - Recent (12 mo) or future (30 days) visit within the authorizing provider's specialty     Patient has had an office visit with the authorizing provider or a provider within the authorizing providers department within the previous 12 mos or has a future within next 30 days. See \"Patient Info\" tab in inbasket, or \"Choose Columns\" in Meds & Orders section of the refill encounter.              Passed - Medication is active on med list        Passed - Patient is age 18 or older        Passed - No active pregnancy on record        Passed - No positive pregnancy test in last 12 months             "

## 2020-04-16 NOTE — TELEPHONE ENCOUNTER
Prescription for 90 day supply e-scribed to pharmacy 4-15-20 per primary care provider.    Patient informed via [employer].

## 2020-05-11 ENCOUNTER — MYC MEDICAL ADVICE (OUTPATIENT)
Dept: INTERNAL MEDICINE | Facility: CLINIC | Age: 65
End: 2020-05-11

## 2020-05-11 ENCOUNTER — TRANSFERRED RECORDS (OUTPATIENT)
Dept: HEALTH INFORMATION MANAGEMENT | Facility: CLINIC | Age: 65
End: 2020-05-11

## 2020-05-12 ENCOUNTER — VIRTUAL VISIT (OUTPATIENT)
Dept: INTERNAL MEDICINE | Facility: CLINIC | Age: 65
End: 2020-05-12
Payer: COMMERCIAL

## 2020-05-12 VITALS — WEIGHT: 155 LBS | TEMPERATURE: 97.2 F | BODY MASS INDEX: 29.05 KG/M2
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
## 2020-05-12 PROCEDURE — 99213 OFFICE O/P EST LOW 20 MIN: CPT | Mod: GT | Performed by: INTERNAL MEDICINE

## 2020-05-12 NOTE — PROGRESS NOTES
"Su Tinoco is a 65 year old female who is being evaluated via a billable video visit.      The patient has been notified of following:     \"This video visit will be conducted via a call between you and your physician/provider. We have found that certain health care needs can be provided without the need for an in-person physical exam.  This service lets us provide the care you need with a video conversation.  If a prescription is necessary we can send it directly to your pharmacy.  If lab work is needed we can place an order for that and you can then stop by our lab to have the test done at a later time.    Video visits are billed at different rates depending on your insurance coverage.  Please reach out to your insurance provider with any questions.    If during the course of the call the physician/provider feels a video visit is not appropriate, you will not be charged for this service.\"    Patient has given verbal consent for Video visit? Yes    How would you like to obtain your AVS? [facility]    Patient would like the video invitation sent by: Text to cell phone: [phone]    Will anyone else be joining your video visit? No      Subjective     Su Tinoco is a 65 year old female who presents today via video visit for the following health issues:    [facility]      Video Start Time: 12:57    Bug bite: She felt a bite at the back of her neck 3 [place] Sshe slapped at it and did see a very small bug, was not sure what kind of bug it was.  Within a fairly short period of time she did develop a welt at the site of the bite.  This increased and at its maximum yesterday was about nickel sized, today it is slightly smaller, about dime size.  She had sent a photo of the area yesterday and was advised to schedule this appointment.  She did not have any systemwide itching or hives.  There is been no drainage from the area.  She does have a tendency to react quite a bit to insect bites. "     Patient Active Problem List   Diagnosis     Allergic rhinitis     Hypothyroidism     Migraine     Mild intermittent asthma     CARDIOVASCULAR SCREENING; LDL GOAL LESS THAN 160     Spinal stenosis of lumbar region     Spondylolisthesis at L5-S1 level     Controlled substance agreement signed     Collagenous colitis     Status post lumbar spinal fusion     Osteopenia, unspecified location     Anxiety     Current Outpatient Medications   Medication Sig Dispense Refill     alendronate (FOSAMAX) 70 MG tablet Take 1 tablet (70 mg) by mouth every 7 days 4 tablet 11     Biotin w/ Vitamins C & E (HAIR SKIN & NAILS GUMMIES) 1250-7.5-7.5 MCG-MG-UNT CHEW Take 2 chew tab by mouth daily       Calcium Citrate-Vitamin D (CITRACAL MAXIMUM PO) Take 1,200 Units by mouth 2 times daily       EPINEPHrine (EPIPEN) 0.3 MG/0.3ML injection Inject 0.3 mLs (0.3 mg) into the muscle once as needed for anaphylaxis 0.6 mL 11     fluticasone (FLONASE) 50 MCG/ACT nasal spray Spray 1 spray into both nostrils daily       levocetirizine (XYZAL) 5 MG tablet Take 5 mg by mouth every morning       levothyroxine (SYNTHROID/LEVOTHROID) 88 MCG tablet Take 1 tablet (88 mcg) by mouth daily (Patient taking differently: Take 88 mcg by mouth daily GABBI: Brand Name Synthroid) 90 tablet 3     melatonin 5 MG tablet Take 10 mg by mouth At Bedtime (5MG X 2 = 10MG)       mometasone-formoterol (DULERA) 200-5 MCG/ACT inhaler Take 1 Act by mouth daily       montelukast (SINGULAIR) 10 MG tablet Take 10 mg by mouth At Bedtime       Multiple Vitamins-Minerals (WOMENS MULTI VITAMIN & MINERAL) TABS Take 1 tablet by mouth daily       Probiotic Product (DAILY PROBIOTIC) CAPS Take 1 capsule by mouth daily       sertraline (ZOLOFT) 100 MG tablet Take 1 tablet (100 mg) by mouth daily 90 tablet 1     traZODone (DESYREL) 100 MG tablet Take 1 tablet (100 mg) by mouth At Bedtime 90 tablet 3     VENTOLIN  (90 Base) MCG/ACT inhaler INHALE 1 TO 2 PUFFS INTO THE LUNGS EVERY 6  "HOURS AS NEEDED 36 g 0     Vitamin D, Cholecalciferol, 1000 units TABS Take 1,000 Units by mouth daily        Social History     Tobacco Use     Smoking status: Never Smoker     Smokeless tobacco: Never Used   Substance Use Topics     Alcohol use: Yes     Comment: rarely- once a month     Drug use: No            Reviewed and updated as needed this visit by Provider         Review of Systems   No fever, chills      Objective    LMP 12/01/2007   Estimated body mass index is 29.44 kg/m  as calculated from the following:    Height as of 2/7/20: 1.556 m (5' 1.25\").    Weight as of 2/7/20: 71.3 kg (157 lb 1.6 oz).  Physical Exam     GENERAL: Healthy, alert and no distress  EYES: Eyes grossly normal to inspection.  No discharge or erythema, or obvious scleral/conjunctival abnormalities.  RESP: No audible wheeze, cough, or visible cyanosis.  No visible retractions or increased work of breathing.    NEURO: Cranial nerves grossly intact.  Mentation and speech appropriate for age.  PSYCH: Mentation appears normal, affect normal/bright, judgement and insight intact, normal speech and appearance well-groomed.    I am not able to see the area in question very well on the phone but photograph shows a raised area with surrounding pale erythema            Assessment & Plan     1. Bug bite, initial encounter  It is unclear what type of bite this was, since she felt it is possible it was very small horsefly, not likely to be a tick.  Her reaction was fairly immediate and is now starting to improve.  Advised this likely is a localized reaction to an insect bite and I do not believe this represents any type of infection.  Advised she can use hydrocortisone and warm packing to help it go away faster.  Advised she could feel a lymph node possibly develop in the next few days and not to worry if it does.  If there is significantly increasing swelling, pain, will suggest she call.             Video-Visit Details    Type of service:  Video " Visit    Video End Time:1:06    Originating Location (pt. Location): Home    Distant Location (provider location):  [facility]     Platform used for Video Visit: Doximity    Return in about 2 months (around 7/12/2020) for Physical Exam.       Jessica Smith MD

## 2020-06-29 ENCOUNTER — TRANSFERRED RECORDS (OUTPATIENT)
Dept: HEALTH INFORMATION MANAGEMENT | Facility: CLINIC | Age: 65
End: 2020-06-29

## 2020-07-07 DIAGNOSIS — F41.9 ANXIETY: ICD-10-CM

## 2020-07-08 RX ORDER — SERTRALINE HYDROCHLORIDE 100 MG/1
TABLET, FILM COATED ORAL
Qty: 90 TABLET | Refills: 1 | OUTPATIENT
Start: 2020-07-08

## 2020-07-20 ENCOUNTER — MYC REFILL (OUTPATIENT)
Dept: INTERNAL MEDICINE | Facility: CLINIC | Age: 65
End: 2020-07-20

## 2020-07-20 DIAGNOSIS — G47.00 INSOMNIA, UNSPECIFIED TYPE: ICD-10-CM

## 2020-07-20 DIAGNOSIS — E03.9 ACQUIRED HYPOTHYROIDISM: ICD-10-CM

## 2020-07-20 RX ORDER — LEVOTHYROXINE SODIUM 88 UG/1
88 TABLET ORAL DAILY
Qty: 90 TABLET | Refills: 3 | Status: CANCELLED | OUTPATIENT
Start: 2020-07-20

## 2020-07-20 RX ORDER — TRAZODONE HYDROCHLORIDE 100 MG/1
100 TABLET ORAL AT BEDTIME
Qty: 90 TABLET | Refills: 3 | Status: CANCELLED | OUTPATIENT
Start: 2020-07-20

## 2020-07-21 ENCOUNTER — VIRTUAL VISIT (OUTPATIENT)
Dept: INTERNAL MEDICINE | Facility: CLINIC | Age: 65
End: 2020-07-21
Payer: COMMERCIAL

## 2020-07-21 DIAGNOSIS — J45.20 MILD INTERMITTENT ASTHMA WITHOUT COMPLICATION: ICD-10-CM

## 2020-07-21 DIAGNOSIS — E03.9 ACQUIRED HYPOTHYROIDISM: ICD-10-CM

## 2020-07-21 DIAGNOSIS — G47.00 INSOMNIA, UNSPECIFIED TYPE: ICD-10-CM

## 2020-07-21 DIAGNOSIS — F41.9 ANXIETY: Primary | ICD-10-CM

## 2020-07-21 PROCEDURE — 99214 OFFICE O/P EST MOD 30 MIN: CPT | Mod: 95 | Performed by: INTERNAL MEDICINE

## 2020-07-21 RX ORDER — LEVOTHYROXINE SODIUM 88 UG/1
88 TABLET ORAL DAILY
Qty: 90 TABLET | Refills: 0 | Status: SHIPPED | OUTPATIENT
Start: 2020-07-21 | End: 2020-08-03

## 2020-07-21 RX ORDER — TRAZODONE HYDROCHLORIDE 100 MG/1
100 TABLET ORAL AT BEDTIME
Qty: 90 TABLET | Refills: 0 | Status: SHIPPED | OUTPATIENT
Start: 2020-07-21 | End: 2020-09-25

## 2020-07-21 ASSESSMENT — PATIENT HEALTH QUESTIONNAIRE - PHQ9
5. POOR APPETITE OR OVEREATING: SEVERAL DAYS
SUM OF ALL RESPONSES TO PHQ QUESTIONS 1-9: 1

## 2020-07-21 ASSESSMENT — ANXIETY QUESTIONNAIRES
2. NOT BEING ABLE TO STOP OR CONTROL WORRYING: NOT AT ALL
1. FEELING NERVOUS, ANXIOUS, OR ON EDGE: NOT AT ALL
IF YOU CHECKED OFF ANY PROBLEMS ON THIS QUESTIONNAIRE, HOW DIFFICULT HAVE THESE PROBLEMS MADE IT FOR YOU TO DO YOUR WORK, TAKE CARE OF THINGS AT HOME, OR GET ALONG WITH OTHER PEOPLE: SOMEWHAT DIFFICULT
7. FEELING AFRAID AS IF SOMETHING AWFUL MIGHT HAPPEN: NOT AT ALL
GAD7 TOTAL SCORE: 2
6. BECOMING EASILY ANNOYED OR IRRITABLE: NOT AT ALL
5. BEING SO RESTLESS THAT IT IS HARD TO SIT STILL: NOT AT ALL
3. WORRYING TOO MUCH ABOUT DIFFERENT THINGS: SEVERAL DAYS

## 2020-07-21 NOTE — PROGRESS NOTES
"Su Tinoco is a 65 year old female who is being evaluated via a billable video visit.      The patient has been notified of following:     \"This video visit will be conducted via a call between you and your physician/provider. We have found that certain health care needs can be provided without the need for an in-person physical exam.  This service lets us provide the care you need with a video conversation.  If a prescription is necessary we can send it directly to your pharmacy.  If lab work is needed we can place an order for that and you can then stop by our lab to have the test done at a later time.    Video visits are billed at different rates depending on your insurance coverage.  Please reach out to your insurance provider with any questions.    If during the course of the call the physician/provider feels a video visit is not appropriate, you will not be charged for this service.\"    Patient has given verbal consent for Video visit? Yes  How would you like to obtain your AVS? Mail a copy  If you are dropped from the video visit, the video invite should be resent to: Text to cell phone: [phone]  Will anyone else be joining your video visit? No    Subjective     Su Tinoco is a 65 year old female who presents today via video visit for the following health issues:    [facility]    Video start time: 3:14     Anxiety Follow-Up        How are you doing with your anxiety since your last visit?  Improved, she feels medication is working well, she is sleeping well.  She is been doing mostly virtual visits for her work.    Are you having other symptoms that might be associated with depression or anxiety? No    Have you had a significant life event? No     Do you have any concerns with your use of alcohol or other drugs? No    Social History     Tobacco Use     Smoking status: Never Smoker     Smokeless tobacco: Never Used   Substance Use Topics     Alcohol use: Yes     Comment: rarely- once a month     Drug " use: No       BRONWYN-7 SCORE 11/12/2018 2/7/2020 7/21/2020   Total Score - - -   Total Score 1 (minimal anxiety) - -   Total Score 1 15 2       Hypothyroidism Follow-up      Since last visit, patient describes the following symptoms: Weight stable, no hair loss, no skin changes, no constipation, no loose stools and weight gain of 10 lbs      How many servings of fruits and vegetables do you eat daily?  2-3    On average, how many sweetened beverages do you drink each day (Examples: soda, juice, sweet tea, etc.  Do NOT count diet or artificially sweetened beverages)?   0    How many days per week do you exercise enough to make your heart beat faster? 6    How many minutes a day do you exercise enough to make your heart beat faster? 20 - 29    How many days per week do you miss taking your medication? 0    Other problems:  Asthma: Doing well, she has been able to stop the Dulera for a few months now.    Patient Active Problem List   Diagnosis     Allergic rhinitis     Hypothyroidism     Migraine     Mild intermittent asthma     CARDIOVASCULAR SCREENING; LDL GOAL LESS THAN 160     Spinal stenosis of lumbar region     Spondylolisthesis at L5-S1 level     Controlled substance agreement signed     Collagenous colitis     Status post lumbar spinal fusion     Osteopenia, unspecified location     Anxiety     Current Outpatient Medications   Medication Sig Dispense Refill     alendronate (FOSAMAX) 70 MG tablet Take 1 tablet (70 mg) by mouth every 7 days 4 tablet 11     Biotin w/ Vitamins C & E (HAIR SKIN & NAILS GUMMIES) 1250-7.5-7.5 MCG-MG-UNT CHEW Take 2 chew tab by mouth daily       Calcium Citrate-Vitamin D (CITRACAL MAXIMUM PO) Take 1,200 Units by mouth 2 times daily       EPINEPHrine (EPIPEN) 0.3 MG/0.3ML injection Inject 0.3 mLs (0.3 mg) into the muscle once as needed for anaphylaxis 0.6 mL 11     fluticasone (FLONASE) 50 MCG/ACT nasal spray Spray 1 spray into both nostrils daily       levocetirizine (XYZAL) 5 MG tablet  Take 5 mg by mouth every morning       levothyroxine (SYNTHROID/LEVOTHROID) 88 MCG tablet Take 1 tablet (88 mcg) by mouth daily (Patient taking differently: Take 88 mcg by mouth daily GABBI: Brand Name Synthroid) 90 tablet 3     mometasone-formoterol (DULERA) 200-5 MCG/ACT inhaler Take 1 Act by mouth daily       montelukast (SINGULAIR) 10 MG tablet Take 10 mg by mouth At Bedtime       Multiple Vitamins-Minerals (WOMENS MULTI VITAMIN & MINERAL) TABS Take 1 tablet by mouth daily       Probiotic Product (DAILY PROBIOTIC) CAPS Take 1 capsule by mouth daily       sertraline (ZOLOFT) 100 MG tablet Take 1 tablet (100 mg) by mouth daily 90 tablet 1     traZODone (DESYREL) 100 MG tablet Take 1 tablet (100 mg) by mouth At Bedtime 90 tablet 3     VENTOLIN  (90 Base) MCG/ACT inhaler INHALE 1 TO 2 PUFFS INTO THE LUNGS EVERY 6 HOURS AS NEEDED 36 g 0     Vitamin D, Cholecalciferol, 1000 units TABS Take 1,000 Units by mouth daily       melatonin 5 MG tablet Take 10 mg by mouth At Bedtime (5MG X 2 = 10MG)        Social History     Tobacco Use     Smoking status: Never Smoker     Smokeless tobacco: Never Used   Substance Use Topics     Alcohol use: Yes     Comment: rarely- once a month     Drug use: No        Reviewed and updated as needed this visit by Provider         Review of Systems   No fever, chills, cough, shortness of breath.  She reports she has a cataract will have surgery in September.      Objective             Physical Exam     GENERAL: Healthy, alert and no distress  EYES: Eyes grossly normal to inspection.  No discharge or erythema, or obvious scleral/conjunctival abnormalities.  RESP: No audible wheeze, cough, or visible cyanosis.  No visible retractions or increased work of breathing.    SKIN: Visible skin clear. No significant rash, abnormal pigmentation or lesions.  NEURO: Cranial nerves grossly intact.  Mentation and speech appropriate for age.  PSYCH: Mentation appears normal, affect normal/bright,  judgement and insight intact, normal speech and appearance well-groomed.      BRONWYN-7 SCORE 11/12/2018 2/7/2020 7/21/2020   Total Score - - -   Total Score 1 (minimal anxiety) - -   Total Score 1 15 2               Assessment & Plan     1. Anxiety  Symptoms well controlled, continue medication    2. Acquired hypothyroidism  She is stable clinically, her labs have been stable the last few years so we elected to not have her come do her lab right now but instead she can deal with her physical in a couple months.  - levothyroxine (SYNTHROID/LEVOTHROID) 88 MCG tablet; Take 1 tablet (88 mcg) by mouth daily  Dispense: 90 tablet; Refill: 0    3. Mild intermittent asthma without complication  Well-controlled    4. Insomnia, unspecified type  Stable  - traZODone (DESYREL) 100 MG tablet; Take 1 tablet (100 mg) by mouth At Bedtime  Dispense: 90 tablet; Refill: 0           Video-Visit Details    Type of service:  Video Visit    Video End Time:3:27    Originating Location (pt. Location): Home    Distant Location (provider location): Home    Platform used for Video Visit: Doximity    Return in about 2 months (around 9/21/2020) for Physical Exam.       Jessica Smith MD

## 2020-07-22 ASSESSMENT — ANXIETY QUESTIONNAIRES: GAD7 TOTAL SCORE: 2

## 2020-08-03 ENCOUNTER — MYC REFILL (OUTPATIENT)
Dept: INTERNAL MEDICINE | Facility: CLINIC | Age: 65
End: 2020-08-03

## 2020-08-03 DIAGNOSIS — E03.9 ACQUIRED HYPOTHYROIDISM: ICD-10-CM

## 2020-08-04 RX ORDER — LEVOTHYROXINE SODIUM 88 UG/1
88 TABLET ORAL DAILY
Qty: 90 TABLET | Refills: 0 | Status: SHIPPED | OUTPATIENT
Start: 2020-08-04 | End: 2020-11-03

## 2020-08-24 ENCOUNTER — OFFICE VISIT (OUTPATIENT)
Dept: INTERNAL MEDICINE | Facility: CLINIC | Age: 65
End: 2020-08-24
Payer: COMMERCIAL

## 2020-08-24 VITALS
RESPIRATION RATE: 20 BRPM | OXYGEN SATURATION: 97 % | HEIGHT: 61 IN | TEMPERATURE: 98.1 F | DIASTOLIC BLOOD PRESSURE: 76 MMHG | WEIGHT: 160.7 LBS | SYSTOLIC BLOOD PRESSURE: 114 MMHG | BODY MASS INDEX: 30.34 KG/M2 | HEART RATE: 66 BPM

## 2020-08-24 DIAGNOSIS — Z01.818 PREOP GENERAL PHYSICAL EXAM: Primary | ICD-10-CM

## 2020-08-24 DIAGNOSIS — H26.9 CATARACT OF BOTH EYES, UNSPECIFIED CATARACT TYPE: ICD-10-CM

## 2020-08-24 PROCEDURE — 99214 OFFICE O/P EST MOD 30 MIN: CPT | Performed by: INTERNAL MEDICINE

## 2020-08-24 ASSESSMENT — MIFFLIN-ST. JEOR: SCORE: 1225.27

## 2020-08-24 NOTE — NURSING NOTE
"/76 (BP Location: Right arm, Patient Position: Sitting, Cuff Size: Adult Regular)   Pulse 66   Temp 98.1  F (36.7  C) (Oral)   Resp 20   Ht 1.556 m (5' 1.25\")   Wt 72.9 kg (160 lb 11.2 oz)   LMP 12/01/2007   SpO2 97%   BMI 30.12 kg/m      "

## 2020-08-24 NOTE — PROGRESS NOTES
[place] [address]  [facility] [address]  [phone]  Dept: [phone]    PRE-OP EVALUATION:  Today's date: 2020    Su Tinoco (: 1955) presents for pre-operative evaluation assessment as requested by Dr. Karina Howell.  She requires evaluation and anesthesia risk assessment prior to undergoing surgery/procedure for treatment of Cataract of both eyes .    Fax number for surgical facility: [facility]   Fax [phone]  Primary Physician: Jessica Smith  Type of Anesthesia Anticipated: General    Preop Questionnnaire:  Pre-op Questionnaire 2020   Surgery Location: [facility]   Surgeon: Dr. Karina Howell   Surgery/Procedure: Cataract on both eyes   Surgery Date:  & 10/1   Time of Surgery: 7:30 am   Where patient plans to recover: At home with family   Have you ever had a heart attack or stroke? No   Have you ever had surgery on your heart or blood vessels, such as a stent placement, a coronary artery bypass, or surgery on an artery in your head, neck, heart, or legs? No   Do you have chest pain with activity? No   Do you have a history of  heart failure? No   Do you currently have a cold, bronchitis or symptoms of other infection? No   Do you have a cough, shortness of breath, or wheezing? No   Do you or anyone in your family have previous history of blood clots? No   Do you or does anyone in your family have a serious bleeding problem such as prolonged bleeding following surgeries or cuts? No   Have you ever had problems with anemia or been told to take iron pills? No   Have you had any abnormal blood loss such as black, tarry or bloody stools, or abnormal vaginal bleeding? No   Have you ever had a blood transfusion? No   Are you willing to have a blood transfusion if it is medically needed before, during, or after your surgery? Yes   Have you or any of your relatives ever had problems with anesthesia? No   Do you have  sleep apnea, excessive snoring or daytime drowsiness? No   Do you have any artifical heart valves or other implanted medical devices like a pacemaker, defibrillator, or continuous glucose monitor? No   Do you have artificial joints? No   Are you allergic to latex? No   Is there any chance that you may be pregnant? No       HPI:     HPI related to upcoming procedure: she has bilateral cataracts affecting vision      See problem list for active medical problems.  Problems all longstanding and stable, except as noted/documented.  See ROS for pertinent symptoms related to these conditions.      MEDICAL HISTORY:     Patient Active Problem List    Diagnosis Date Noted     Anxiety 03/02/2020     Priority: Medium     Osteopenia, unspecified location 02/07/2020     Priority: Medium     Status post lumbar spinal fusion 09/26/2019     Priority: Medium     Collagenous colitis 12/05/2018     Priority: Medium     Spinal stenosis of lumbar region      Priority: Medium     L5, S1       Spondylolisthesis at L5-S1 level      Priority: Medium     CARDIOVASCULAR SCREENING; LDL GOAL LESS THAN 160 10/31/2010     Priority: Medium     Mild intermittent asthma      Priority: Medium     Migraine 01/27/2005     Priority: Medium     Problem list name updated by automated process. Provider to review       Allergic rhinitis 07/21/2003     Priority: Medium     Problem list name updated by automated process. Provider to review       Hypothyroidism 07/21/2003     Priority: Medium     Problem list name updated by automated process. 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      Colitis 11/2018     Mild intermittent asthma     sees allergist     Other specified disorders of thyroid      Spinal stenosis of lumbar region     L5, S1     Spondylolisthesis at L5-S1 level      Sprain of unspecified site of back      Trochanteric bursitis of  right hip      Unspecified hypothyroidism      Past Surgical History:   Procedure Laterality Date     BREAST SURGERY      breaast reduction     C NONSPECIFIC PROCEDURE      Laparoscopies IUD infection     C NONSPECIFIC PROCEDURE      removal of myxoid tumors fingers     C NONSPECIFIC PROCEDURE      sphincterotomy of anus     C/SECTION, LOW TRANSVERSE      , Low Transverse     EYE SURGERY      blepharoplasty     LAMINECTOMY, FUSION LUMBAR ONE LEVEL, COMBINED N/A 2019    Procedure: L4-S1 LAMINECTOMY, L5-S1 POSTERIOR SPINAL FUSION;  Surgeon: Wily Howell MD;  Location: [facility] OR     TONSILLECTOMY & ADENOIDECTOMY       TUBAL LIGATION       Current Outpatient Medications   Medication Sig Dispense Refill     alendronate (FOSAMAX) 70 MG tablet Take 1 tablet (70 mg) by mouth every 7 days 4 tablet 11     Biotin w/ Vitamins C & E (HAIR SKIN & NAILS GUMMIES) 1250-7.5-7.5 MCG-MG-UNT CHEW Take 2 chew tab by mouth daily       Calcium Citrate-Vitamin D (CITRACAL MAXIMUM PO) Take 1,200 Units by mouth 2 times daily       EPINEPHrine (EPIPEN) 0.3 MG/0.3ML injection Inject 0.3 mLs (0.3 mg) into the muscle once as needed for anaphylaxis 0.6 mL 11     fluticasone (FLONASE) 50 MCG/ACT nasal spray Spray 1 spray into both nostrils daily       levocetirizine (XYZAL) 5 MG tablet Take 5 mg by mouth every morning       levothyroxine (SYNTHROID/LEVOTHROID) 88 MCG tablet Take 1 tablet (88 mcg) by mouth daily 90 tablet 0     melatonin 5 MG tablet Take 10 mg by mouth At Bedtime (5MG X 2 = 10MG)       mometasone-formoterol (DULERA) 200-5 MCG/ACT inhaler Take 1 Act by mouth daily       montelukast (SINGULAIR) 10 MG tablet Take 10 mg by mouth At Bedtime       Multiple Vitamins-Minerals (WOMENS MULTI VITAMIN & MINERAL) TABS Take 1 tablet by mouth daily       Probiotic Product (DAILY PROBIOTIC) CAPS Take 1 capsule by mouth daily       sertraline (ZOLOFT) 100 MG tablet Take 1 tablet (100 mg) by mouth daily 90 tablet 1     traZODone  "(DESYREL) 100 MG tablet Take 1 tablet (100 mg) by mouth At Bedtime 90 tablet 0     VENTOLIN  (90 Base) MCG/ACT inhaler INHALE 1 TO 2 PUFFS INTO THE LUNGS EVERY 6 HOURS AS NEEDED 36 g 0     Vitamin D, Cholecalciferol, 1000 units TABS Take 1,000 Units by mouth daily       OTC products: None, except as noted above    Allergies   Allergen Reactions     Nuts Anaphylaxis     peanuts, nara nuts     Compazine      Lock jaw, vision problems and shakes     Erythromycin GI Disturbance     Prochlorperazine      Jaw spasm and vision loss      Latex Allergy: NO    Social History     Tobacco Use     Smoking status: Never Smoker     Smokeless tobacco: Never Used   Substance Use Topics     Alcohol use: Yes     Comment: rarely- once a month     History   Drug Use No       REVIEW OF SYSTEMS:   GENERAL: negative for, fever, chills, weight loss, weight gain  EYES: cataracts  ENT: negative  RESPIRATORY: No dyspnea on exertion and No cough  CARDIOVASCULAR: negative for, palpitations, tachycardia, irregular heart beat and chest pain  GI: negative for, nausea, vomiting, abdominal pain, melena and hematochezia  : negative for, dysuria and hematuria  MUSCULOSKELETAL: back pain  NEUROLOGIC: negative for, headaches, seizures, local weakness, numbness or tingling of hands and numbness or tingling of feet  SKIN: negative  ENDOCRINE: negative      EXAM:   LMP 12/01/2007     Patient is alert, oriented, cooperative in no acute distress.  /76 (BP Location: Right arm, Patient Position: Sitting, Cuff Size: Adult Regular)   Pulse 66   Temp 98.1  F (36.7  C) (Oral)   Resp 20   Ht 1.556 m (5' 1.25\")   Wt 72.9 kg (160 lb 11.2 oz)   LMP 12/01/2007   SpO2 97%   BMI 30.12 kg/m      HEENT: PERRL, EOMI,   NECK: No lymphadenopathy or thyromegaly. Carotid pulses full without bruits.  LUNGS: clear  CV: normal S1, S2 without murmur, S3 or S4 present. Pulses are 2/2 throughout. No JVD.  ABDOMEN: Bowel sounds present, nontender without " hepatosplenomegaly. Liver is normal size to percussion.  EXTREMITIES: no edema present, unremarkable joints      DIAGNOSTICS:   No labs or EKG required for low risk surgery (cataract, skin procedure, breast biopsy, etc)    Recent Labs   Lab Test 09/29/19  0748 09/28/19  0855 09/27/19  0815  09/13/19  1038  02/20/19  1619   HGB  --  9.8* 10.4*  --  12.9  --  13.8   PLT  --   --   --   --  273  --  320   INR  --   --   --   --  1.10  --   --     140 142  --   --    < >  --    POTASSIUM 4.0 3.6 4.2  --   --    < >  --    CR 0.64 0.62 0.70   < >  --    < >  --     < > = values in this interval not displayed.        IMPRESSION:   Reason for surgery/procedure: cataracts   Diagnosis/reason for consult: preop    The proposed surgical procedure is considered LOW risk.    REVISED CARDIAC RISK INDEX  The patient has the following serious cardiovascular risks for perioperative complications such as (MI, PE, VFib and 3  AV Block):  No serious cardiac risks  INTERPRETATION: 0 risks: Class I (very low risk - 0.4% complication rate)    The patient has the following additional risks for perioperative complications:  No identified additional risks      ICD-10-CM    1. Preop general physical exam  Z01.818    2. Cataract of both eyes, unspecified cataract type  H26.9        RECOMMENDATIONS:         --Patient is to take all scheduled medications on the day of surgery EXCEPT for modifications listed below.    APPROVAL GIVEN to proceed with proposed procedure, without further diagnostic evaluation       Signed Electronically by: Jessica Smith MD    Copy of this evaluation report is provided to requesting physician.    [facility] Preop Guidelines    Revised Cardiac Risk Index

## 2020-08-25 ENCOUNTER — DOCUMENTATION ONLY (OUTPATIENT)
Dept: INTERNAL MEDICINE | Facility: CLINIC | Age: 65
End: 2020-08-25

## 2020-08-25 DIAGNOSIS — E03.9 ACQUIRED HYPOTHYROIDISM: Primary | ICD-10-CM

## 2020-08-25 DIAGNOSIS — Z13.1 SCREENING FOR DIABETES MELLITUS: ICD-10-CM

## 2020-08-25 DIAGNOSIS — E88.09 PROTEINS SERUM PLASMA LOW: ICD-10-CM

## 2020-08-25 DIAGNOSIS — Z13.6 CARDIOVASCULAR SCREENING; LDL GOAL LESS THAN 160: ICD-10-CM

## 2020-08-25 DIAGNOSIS — D64.9 ANEMIA, UNSPECIFIED TYPE: ICD-10-CM

## 2020-09-04 DIAGNOSIS — M81.0 OSTEOPOROSIS WITHOUT CURRENT PATHOLOGICAL FRACTURE, UNSPECIFIED OSTEOPOROSIS TYPE: ICD-10-CM

## 2020-09-04 NOTE — TELEPHONE ENCOUNTER
"Requested Prescriptions   Pending Prescriptions Disp Refills     alendronate (FOSAMAX) 70 MG tablet  Last Written Prescription Date:  10/08/19  Last Fill Quantity: 4,  # refills: 11   Last office visit: 8/24/2020 with prescribing provider:  08/24/20   Future Office Visit:   Next 5 appointments (look out 90 days)    Oct 13, 2020 11:00 AM CDT  PHYSICAL with Jessica Smith MD  [facility] [address]  [facility] [id number] [phone]                4 tablet 11     Sig: Take 1 tablet (70 mg) by mouth every 7 days       Bisphosphonates Failed - 9/4/2020  9:39 AM        Failed - Dexa on file within past 2 years     Please review last Dexa result.           Passed - Recent (12 mo) or future (30 days) visit within the authorizing provider's specialty     Patient has had an office visit with the authorizing provider or a provider within the authorizing providers department within the previous 12 mos or has a future within next 30 days. See \"Patient Info\" tab in inbasket, or \"Choose Columns\" in Meds & Orders section of the refill encounter.              Passed - Medication is active on med list        Passed - Patient is age 18 or older        Passed - Normal serum creatinine on file within past 12 months     Recent Labs   Lab Test 09/29/19  0748   CR 0.64       Ok to refill medication if creatinine is low               "

## 2020-09-07 RX ORDER — ALENDRONATE SODIUM 70 MG/1
70 TABLET ORAL
Qty: 4 TABLET | Refills: 11 | OUTPATIENT
Start: 2020-09-07

## 2020-09-10 DIAGNOSIS — E88.09 PROTEINS SERUM PLASMA LOW: ICD-10-CM

## 2020-09-10 DIAGNOSIS — E03.9 ACQUIRED HYPOTHYROIDISM: ICD-10-CM

## 2020-09-10 DIAGNOSIS — D64.9 ANEMIA, UNSPECIFIED TYPE: ICD-10-CM

## 2020-09-10 DIAGNOSIS — Z13.6 CARDIOVASCULAR SCREENING; LDL GOAL LESS THAN 160: ICD-10-CM

## 2020-09-10 DIAGNOSIS — Z13.1 SCREENING FOR DIABETES MELLITUS: ICD-10-CM

## 2020-09-10 LAB
ALBUMIN SERPL-MCNC: 3.6 G/DL (ref 3.4–5)
ALP SERPL-CCNC: 52 U/L (ref 40–150)
ALT SERPL W P-5'-P-CCNC: 34 U/L (ref 0–50)
ANION GAP SERPL CALCULATED.3IONS-SCNC: 6 MMOL/L (ref 3–14)
AST SERPL W P-5'-P-CCNC: 21 U/L (ref 0–45)
BILIRUB SERPL-MCNC: 0.4 MG/DL (ref 0.2–1.3)
BUN SERPL-MCNC: 22 MG/DL (ref 7–30)
CALCIUM SERPL-MCNC: 8.8 MG/DL (ref 8.5–10.1)
CHLORIDE SERPL-SCNC: 106 MMOL/L (ref 94–109)
CHOLEST SERPL-MCNC: 227 MG/DL
CO2 SERPL-SCNC: 27 MMOL/L (ref 20–32)
CREAT SERPL-MCNC: 0.78 MG/DL (ref 0.52–1.04)
GFR SERPL CREATININE-BSD FRML MDRD: 81 ML/MIN/{1.73_M2}
GLUCOSE SERPL-MCNC: 94 MG/DL (ref 70–99)
HDLC SERPL-MCNC: 103 MG/DL
HGB BLD-MCNC: 12.8 G/DL (ref 11.7–15.7)
LDLC SERPL CALC-MCNC: 105 MG/DL
NONHDLC SERPL-MCNC: 124 MG/DL
POTASSIUM SERPL-SCNC: 3.8 MMOL/L (ref 3.4–5.3)
PROT SERPL-MCNC: 7 G/DL (ref 6.8–8.8)
SODIUM SERPL-SCNC: 139 MMOL/L (ref 133–144)
TRIGL SERPL-MCNC: 97 MG/DL
TSH SERPL DL<=0.005 MIU/L-ACNC: 1.31 MU/L (ref 0.4–4)

## 2020-09-10 PROCEDURE — 36415 COLL VENOUS BLD VENIPUNCTURE: CPT | Performed by: INTERNAL MEDICINE

## 2020-09-10 PROCEDURE — 85018 HEMOGLOBIN: CPT | Performed by: INTERNAL MEDICINE

## 2020-09-10 PROCEDURE — 84443 ASSAY THYROID STIM HORMONE: CPT | Performed by: INTERNAL MEDICINE

## 2020-09-10 PROCEDURE — 80061 LIPID PANEL: CPT | Performed by: INTERNAL MEDICINE

## 2020-09-10 PROCEDURE — 80053 COMPREHEN METABOLIC PANEL: CPT | Performed by: INTERNAL MEDICINE

## 2020-09-14 RX ORDER — ALENDRONATE SODIUM 70 MG/1
70 TABLET ORAL
Qty: 4 TABLET | Refills: 11 | Status: SHIPPED | OUTPATIENT
Start: 2020-09-14 | End: 2020-09-15

## 2020-09-14 NOTE — TELEPHONE ENCOUNTER
"Routing refill request to provider for review/approval because:  DEXA-last DEXA was 4/23/18            Requested Prescriptions   Pending Prescriptions Disp Refills     alendronate (FOSAMAX) 70 MG tablet 4 tablet 11     Sig: Take 1 tablet (70 mg) by mouth every 7 days       Bisphosphonates Failed - 9/14/2020  8:25 AM        Failed - Dexa on file within past 2 years     Please review last Dexa result.           Passed - Recent (12 mo) or future (30 days) visit within the authorizing provider's specialty     Patient has had an office visit with the authorizing provider or a provider within the authorizing providers department within the previous 12 mos or has a future within next 30 days. See \"Patient Info\" tab in inbasket, or \"Choose Columns\" in Meds & Orders section of the refill encounter.              Passed - Medication is active on med list        Passed - Patient is age 18 or older        Passed - Normal serum creatinine on file within past 12 months     Recent Labs   Lab Test 09/10/20  0835   CR 0.78       Ok to refill medication if creatinine is low           Refused Prescriptions Disp Refills     alendronate (FOSAMAX) 70 MG tablet 4 tablet 11     Sig: Take 1 tablet (70 mg) by mouth every 7 days       Bisphosphonates Failed - 9/14/2020  8:25 AM        Failed - Dexa on file within past 2 years     Please review last Dexa result.           Passed - Recent (12 mo) or future (30 days) visit within the authorizing provider's specialty     Patient has had an office visit with the authorizing provider or a provider within the authorizing providers department within the previous 12 mos or has a future within next 30 days. See \"Patient Info\" tab in inbasket, or \"Choose Columns\" in Meds & Orders section of the refill encounter.              Passed - Medication is active on med list        Passed - Patient is age 18 or older        Passed - Normal serum creatinine on file within past 12 months     Recent Labs   Lab Test " 09/10/20  0835   CR 0.78       Ok to refill medication if creatinine is low

## 2020-09-24 DIAGNOSIS — G47.00 INSOMNIA, UNSPECIFIED TYPE: ICD-10-CM

## 2020-09-25 RX ORDER — TRAZODONE HYDROCHLORIDE 100 MG/1
100 TABLET ORAL AT BEDTIME
Qty: 90 TABLET | Refills: 3 | Status: SHIPPED | OUTPATIENT
Start: 2020-09-25 | End: 2021-10-14

## 2020-09-25 NOTE — TELEPHONE ENCOUNTER
"Requested Prescriptions   Pending Prescriptions Disp Refills     traZODone (DESYREL) 100 MG tablet 90 tablet 0     Sig: Take 1 tablet (100 mg) by mouth At Bedtime       Serotonin Modulators Passed - 9/24/2020  3:28 PM        Passed - Recent (12 mo) or future (30 days) visit within the authorizing provider's specialty     Patient has had an office visit with the authorizing provider or a provider within the authorizing providers department within the previous 12 mos or has a future within next 30 days. See \"Patient Info\" tab in inbasket, or \"Choose Columns\" in Meds & Orders section of the refill encounter.              Passed - Medication is active on med list        Passed - Patient is age 18 or older        Passed - No active pregnancy on record        Passed - No positive pregnancy test in past 12 months             Last office visit 8-24-20    Routing refill request to provider for review/approval because:  Drug interaction warning    Please advise, thanks.            "

## 2020-10-19 ENCOUNTER — TRANSFERRED RECORDS (OUTPATIENT)
Dept: HEALTH INFORMATION MANAGEMENT | Facility: CLINIC | Age: 65
End: 2020-10-19

## 2020-10-22 ENCOUNTER — TRANSFERRED RECORDS (OUTPATIENT)
Dept: HEALTH INFORMATION MANAGEMENT | Facility: CLINIC | Age: 65
End: 2020-10-22

## 2020-11-02 ENCOUNTER — OFFICE VISIT (OUTPATIENT)
Dept: FAMILY MEDICINE | Facility: CLINIC | Age: 65
End: 2020-11-02
Payer: COMMERCIAL

## 2020-11-02 VITALS
BODY MASS INDEX: 28.35 KG/M2 | OXYGEN SATURATION: 98 % | WEIGHT: 160 LBS | RESPIRATION RATE: 20 BRPM | HEIGHT: 63 IN | SYSTOLIC BLOOD PRESSURE: 134 MMHG | DIASTOLIC BLOOD PRESSURE: 76 MMHG | TEMPERATURE: 98.3 F | HEART RATE: 82 BPM

## 2020-11-02 DIAGNOSIS — M19.042 DEGENERATIVE ARTHRITIS OF FINGER, LEFT: ICD-10-CM

## 2020-11-02 DIAGNOSIS — M85.80 OSTEOPENIA, UNSPECIFIED LOCATION: ICD-10-CM

## 2020-11-02 DIAGNOSIS — J45.20 MILD INTERMITTENT ASTHMA WITHOUT COMPLICATION: ICD-10-CM

## 2020-11-02 DIAGNOSIS — Z01.818 PREOP GENERAL PHYSICAL EXAM: Primary | ICD-10-CM

## 2020-11-02 DIAGNOSIS — F41.9 ANXIETY: ICD-10-CM

## 2020-11-02 DIAGNOSIS — E03.9 ACQUIRED HYPOTHYROIDISM: ICD-10-CM

## 2020-11-02 PROCEDURE — 99214 OFFICE O/P EST MOD 30 MIN: CPT | Performed by: FAMILY MEDICINE

## 2020-11-02 ASSESSMENT — MIFFLIN-ST. JEOR: SCORE: 1249.89

## 2020-11-02 NOTE — PROGRESS NOTES
[facility]  [address] [facility] [address]  Phone: [phone]  Primary Provider: Jessica Smith  Pre-op Performing Provider: ANASTASIA ANN    PREOPERATIVE EVALUATION:  Today's date: 11/2/2020    Su Tinoco is a 65 year old female who presents for a preoperative evaluation.    Surgical Information:   Surgery/Procedure: Fusion of left D5 Distal & Middle Phalanx.  Surgery Location: [facility]  Surgeon: Dr. Denys Obregon  Surgery Date: 11/06/2020  Time of Surgery: 0715  Where patient plans to recover: At home with family  Fax number for surgical facility: need to locate    Type of Anesthesia Anticipated: General    Subjective     HPI related to upcoming procedure: 5th digit arthritis left hand    Preop Questions 11/1/2020   1. Have you ever had a heart attack or stroke? No   2. Have you ever had surgery on your heart or blood vessels, such as a stent placement, a coronary artery bypass, or surgery on an artery in your head, neck, heart, or legs? No   3. Do you have chest pain with activity? No   4. Do you have a history of  heart failure? No   5. Do you currently have a cold, bronchitis or symptoms of other infection? No   6. Do you have a cough, shortness of breath, or wheezing? No   7. Do you or anyone in your family have previous history of blood clots? No   8. Do you or does anyone in your family have a serious bleeding problem such as prolonged bleeding following surgeries or cuts? No   9. Have you ever had problems with anemia or been told to take iron pills? No   10. Have you had any abnormal blood loss such as black, tarry or bloody stools, or abnormal vaginal bleeding? No   11. Have you ever had a blood transfusion? No   12. Are you willing to have a blood transfusion if it is medically needed before, during, or after your surgery? Yes   13. Have you or any of your relatives ever had problems with anesthesia? No   14. Do you have sleep apnea,  excessive snoring or daytime drowsiness? No   15. Do you have any artifical heart valves or other implanted medical devices like a pacemaker, defibrillator, or continuous glucose monitor? No   16. Do you have artificial joints? No   17. Are you allergic to latex? No   18. Is there any chance that you may be pregnant? -       Health Care Directive:  Patient has a Health Care Directive on file    History of intermittent asthma and allergies taking Singulair.  Currently well controlled.    ACT Total Scores 5/27/2019 2/7/2020 11/2/2020   ACT TOTAL SCORE - - -   ASTHMA ER VISITS - - -   ASTHMA HOSPITALIZATIONS - - -   ACT TOTAL SCORE (Goal Greater than or Equal to 20) 25 25 24   In the past 12 months, how many times did you visit the emergency room for your asthma without being admitted to the hospital? 0 0 0   In the past 12 months, how many times were you hospitalized overnight because of your asthma? 0 0 0     History of anxiety now off sertraline, taking trazodone at night for insomnia.    History of hypothyroidism stable on levothyroxine.    History of osteopenia on Fosamax.    Patient is otherwise healthy and active.    Review of Systems  CONSTITUTIONAL: NEGATIVE for fever, chills, change in weight  INTEGUMENTARY/SKIN: NEGATIVE for worrisome rashes, moles or lesions  EYES: NEGATIVE for vision changes or irritation  ENT/MOUTH: NEGATIVE for ear, mouth and throat problems  RESP: NEGATIVE for significant cough or SOB  CV: NEGATIVE for chest pain, palpitations or peripheral edema  GI: NEGATIVE for nausea, abdominal pain, heartburn, or change in bowel habits  : NEGATIVE for frequency, dysuria, or hematuria  MUSCULOSKELETAL:Finger pain and stiffness as noted  NEURO: NEGATIVE for weakness, dizziness or paresthesias  ENDOCRINE: NEGATIVE for temperature intolerance, skin/hair changes  HEME: NEGATIVE for bleeding problems  PSYCHIATRIC: NEGATIVE for changes in mood or affect    Patient Active Problem List    Diagnosis Date  Noted     Anxiety 2020     Priority: Medium     Osteopenia, unspecified location 2020     Priority: Medium     Status post lumbar spinal fusion 2019     Priority: Medium     Collagenous colitis 2018     Priority: Medium     Spinal stenosis of lumbar region      Priority: Medium     L5, S1       Spondylolisthesis at L5-S1 level      Priority: Medium     CARDIOVASCULAR SCREENING; LDL GOAL LESS THAN 160 10/31/2010     Priority: Medium     Mild intermittent asthma      Priority: Medium     Migraine 2005     Priority: Medium     Problem list name updated by automated process. Provider to review       Allergic rhinitis 2003     Priority: Medium     Problem list name updated by automated process. Provider to review       Hypothyroidism 2003     Priority: Medium     Problem list name updated by automated process. 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      Colitis 2018     Mild intermittent asthma     sees allergist     Other specified disorders of thyroid      Spinal stenosis of lumbar region     L5, S1     Spondylolisthesis at L5-S1 level      Sprain of unspecified site of back      Trochanteric bursitis of right hip      Unspecified hypothyroidism      Past Surgical History:   Procedure Laterality Date     BREAST SURGERY      breast reduction     C/SECTION, LOW TRANSVERSE      , Low Transverse     EYE SURGERY      blepharoplasty     LAMINECTOMY, FUSION LUMBAR ONE LEVEL, COMBINED N/A 2019    Procedure: L4-S1 LAMINECTOMY, L5-S1 POSTERIOR SPINAL FUSION;  Surgeon: Wily Howell MD;  Location:  OR     TONSILLECTOMY & ADENOIDECTOMY       TUBAL LIGATION       Z NONSPECIFIC PROCEDURE      Laparoscopies IUD infection     ZZC NONSPECIFIC PROCEDURE      removal of myxoid tumors fingers     ZZC NONSPECIFIC PROCEDURE      sphincterotomy of  anus     Current Outpatient Medications   Medication Sig Dispense Refill     alendronate (FOSAMAX) 70 MG tablet TAKE 1 TABLET(70 MG) BY MOUTH EVERY 7 DAYS 12 tablet 3     Biotin w/ Vitamins C & E (HAIR SKIN & NAILS GUMMIES) 1250-7.5-7.5 MCG-MG-UNT CHEW Take 2 chew tab by mouth daily       Calcium Citrate-Vitamin D (CITRACAL MAXIMUM PO) Take 1,200 Units by mouth 2 times daily       levothyroxine (SYNTHROID/LEVOTHROID) 88 MCG tablet Take 1 tablet (88 mcg) by mouth daily 90 tablet 0     montelukast (SINGULAIR) 10 MG tablet Take 10 mg by mouth At Bedtime       Multiple Vitamins-Minerals (WOMENS MULTI VITAMIN & MINERAL) TABS Take 1 tablet by mouth daily       traZODone (DESYREL) 100 MG tablet Take 1 tablet (100 mg) by mouth At Bedtime 90 tablet 3     Vitamin D, Cholecalciferol, 1000 units TABS Take 1,000 Units by mouth daily       EPINEPHrine (EPIPEN) 0.3 MG/0.3ML injection Inject 0.3 mLs (0.3 mg) into the muscle once as needed for anaphylaxis 0.6 mL 11     fluticasone (FLONASE) 50 MCG/ACT nasal spray Spray 1 spray into both nostrils daily       levocetirizine (XYZAL) 5 MG tablet Take 5 mg by mouth every morning       melatonin 5 MG tablet Take 10 mg by mouth At Bedtime (5MG X 2 = 10MG)       mometasone-formoterol (DULERA) 200-5 MCG/ACT inhaler Take 1 Act by mouth daily       Probiotic Product (DAILY PROBIOTIC) CAPS Take 1 capsule by mouth daily       VENTOLIN  (90 Base) MCG/ACT inhaler INHALE 1 TO 2 PUFFS INTO THE LUNGS EVERY 6 HOURS AS NEEDED 36 g 0       Allergies   Allergen Reactions     Nuts Anaphylaxis     peanuts, nara nuts     Compazine      Lock jaw, vision problems and shakes     Erythromycin GI Disturbance     Prochlorperazine      Jaw spasm and vision loss        Social History     Tobacco Use     Smoking status: Never Smoker     Smokeless tobacco: Never Used   Substance Use Topics     Alcohol use: Yes     Comment: rarely- once a month     History   Drug Use No         Objective     /76 (BP  "Location: Right arm, Patient Position: Sitting, Cuff Size: Adult Regular)   Pulse 82   Temp 98.3  F (36.8  C) (Oral)   Resp 20   Ht 1.6 m (5' 3\")   Wt 72.6 kg (160 lb)   LMP 12/01/2007   SpO2 98%   BMI 28.34 kg/m      Physical Exam    GENERAL APPEARANCE: healthy, alert and no distress     EYES: EOMI, PERRL     HENT: ear canals and TM's normal and nose and mouth without ulcers or lesions     NECK: no adenopathy, no asymmetry, masses, or scars and thyroid normal to palpation     RESP: lungs clear to auscultation - no rales, rhonchi or wheezes     CV: regular rates and rhythm, normal S1 S2, no S3 or S4 and no murmur, click or rub     ABDOMEN: soft, nontender, without hepatosplenomegaly or masses     MS: extremities normal- no gross deformities noted, no evidence of inflammation in joints, FROM in all extremities.     SKIN: no suspicious lesions or rashes     NEURO: Normal strength and tone, sensory exam grossly normal, mentation intact and speech normal     PSYCH: mentation appears normal. and affect normal/bright     LYMPHATICS: No cervical adenopathy    Recent Labs   Lab Test 09/10/20  0835 09/29/19  0748 09/28/19  0855 09/13/19  1038 09/13/19  1038 02/20/19  1619 02/20/19  1619   HGB 12.8  --  9.8*   < > 12.9  --  13.8   PLT  --   --   --   --  273  --  320   INR  --   --   --   --  1.10  --   --     140 140   < >  --    < >  --    POTASSIUM 3.8 4.0 3.6   < >  --    < >  --    CR 0.78 0.64 0.62   < >  --    < >  --     < > = values in this interval not displayed.        Diagnostics:  Prior labs 9/10/2020:  Hemoglobin 12.8  GFR 81, creatinine 0.78    No EKG required, no history of coronary heart disease, significant arrhythmia, peripheral arterial disease or other structural heart disease.  Prior EKG 9/2019: Normal sinus rhythm, negative precordial T waves seen in leads I and II, otherwise normal    Revised Cardiac Risk Index (RCRI):  The patient has the following serious cardiovascular risks for " perioperative complications:   - No serious cardiac risks = 0 points     RCRI Interpretation: 0 points: Class I (very low risk - 0.4% complication rate)           Assessment & Plan   The proposed surgical procedure is considered INTERMEDIATE risk.    Problem List Items Addressed This Visit     Anxiety    Hypothyroidism    Mild intermittent asthma    Osteopenia, unspecified location      Other Visit Diagnoses     Preop general physical exam    -  Primary    Degenerative arthritis of finger, left             Risks and Recommendations:  The patient has the following additional risks and recommendations for perioperative complications:   - No identified additional risk factors other than previously addressed    Medication Instructions:  Patient is to take all scheduled medications on the day of surgery    RECOMMENDATION:  APPROVAL GIVEN to proceed with proposed procedure, without further diagnostic evaluation.    Signed Electronically by: Dwaine Lewis MD    Copy of this evaluation report is provided to requesting physician.    Preop [facility] Preop Guidelines    Revised Cardiac Risk Index

## 2020-11-02 NOTE — PATIENT INSTRUCTIONS

## 2020-11-03 DIAGNOSIS — E03.9 ACQUIRED HYPOTHYROIDISM: ICD-10-CM

## 2020-11-03 RX ORDER — LEVOTHYROXINE SODIUM 88 UG/1
88 TABLET ORAL DAILY
Qty: 90 TABLET | Refills: 2 | Status: SHIPPED | OUTPATIENT
Start: 2020-11-03 | End: 2021-10-14

## 2020-11-03 ASSESSMENT — ASTHMA QUESTIONNAIRES: ACT_TOTALSCORE: 24

## 2020-11-03 NOTE — TELEPHONE ENCOUNTER
Prescription approved per [facility], [facility] or [facility] refill protocol.  Alaina BURNS - Registered Nurse  [facility]  Acute and Diagnostic Services

## 2020-11-09 ENCOUNTER — TRANSFERRED RECORDS (OUTPATIENT)
Dept: HEALTH INFORMATION MANAGEMENT | Facility: CLINIC | Age: 65
End: 2020-11-09

## 2020-11-10 ASSESSMENT — ENCOUNTER SYMPTOMS
HEARTBURN: 0
DYSURIA: 0
MYALGIAS: 0
FREQUENCY: 0
SHORTNESS OF BREATH: 0
HEADACHES: 0
DIZZINESS: 0
BREAST MASS: 0
PARESTHESIAS: 0
EYE PAIN: 0
SORE THROAT: 0
HEMATURIA: 0
DIARRHEA: 0
PALPITATIONS: 0
FEVER: 0
WEAKNESS: 0
JOINT SWELLING: 0
HEMATOCHEZIA: 0
NERVOUS/ANXIOUS: 0
COUGH: 0
NAUSEA: 0
CHILLS: 0
ARTHRALGIAS: 0
CONSTIPATION: 0
ABDOMINAL PAIN: 0

## 2020-11-12 ENCOUNTER — TRANSFERRED RECORDS (OUTPATIENT)
Dept: HEALTH INFORMATION MANAGEMENT | Facility: CLINIC | Age: 65
End: 2020-11-12

## 2020-11-12 ENCOUNTER — OFFICE VISIT (OUTPATIENT)
Dept: INTERNAL MEDICINE | Facility: CLINIC | Age: 65
End: 2020-11-12
Payer: COMMERCIAL

## 2020-11-12 VITALS
SYSTOLIC BLOOD PRESSURE: 120 MMHG | TEMPERATURE: 97.7 F | BODY MASS INDEX: 29.63 KG/M2 | OXYGEN SATURATION: 98 % | DIASTOLIC BLOOD PRESSURE: 76 MMHG | RESPIRATION RATE: 18 BRPM | HEART RATE: 87 BPM | WEIGHT: 161 LBS | HEIGHT: 62 IN

## 2020-11-12 DIAGNOSIS — H61.20 WAX IN EAR: ICD-10-CM

## 2020-11-12 DIAGNOSIS — Z00.00 ENCOUNTER FOR ROUTINE ADULT HEALTH EXAMINATION WITHOUT ABNORMAL FINDINGS: Primary | ICD-10-CM

## 2020-11-12 DIAGNOSIS — M43.17 SPONDYLOLISTHESIS AT L5-S1 LEVEL: ICD-10-CM

## 2020-11-12 DIAGNOSIS — J45.20 MILD INTERMITTENT ASTHMA WITHOUT COMPLICATION: ICD-10-CM

## 2020-11-12 DIAGNOSIS — E03.9 ACQUIRED HYPOTHYROIDISM: ICD-10-CM

## 2020-11-12 DIAGNOSIS — M85.80 OSTEOPENIA, UNSPECIFIED LOCATION: ICD-10-CM

## 2020-11-12 PROCEDURE — 99396 PREV VISIT EST AGE 40-64: CPT | Performed by: INTERNAL MEDICINE

## 2020-11-12 PROCEDURE — 99213 OFFICE O/P EST LOW 20 MIN: CPT | Mod: 25 | Performed by: INTERNAL MEDICINE

## 2020-11-12 ASSESSMENT — ENCOUNTER SYMPTOMS
COUGH: 0
PALPITATIONS: 0
ABDOMINAL PAIN: 0
CONSTIPATION: 0
ARTHRALGIAS: 0
BREAST MASS: 0
HEARTBURN: 0
CHILLS: 0
DIARRHEA: 0
HEMATURIA: 0
NERVOUS/ANXIOUS: 0
SHORTNESS OF BREATH: 0
NAUSEA: 0
DYSURIA: 0
SORE THROAT: 0
FEVER: 0
JOINT SWELLING: 0
HEADACHES: 0
DIZZINESS: 0
WEAKNESS: 0
PARESTHESIAS: 0
EYE PAIN: 0
FREQUENCY: 0
HEMATOCHEZIA: 0
MYALGIAS: 0

## 2020-11-12 ASSESSMENT — MIFFLIN-ST. JEOR: SCORE: 1238.54

## 2020-11-12 NOTE — NURSING NOTE
"/76 (BP Location: Left arm, Patient Position: Sitting, Cuff Size: Adult Regular)   Pulse 87   Temp 97.7  F (36.5  C) (Oral)   Resp 18   Ht 1.575 m (5' 2\")   Wt 73 kg (161 lb)   LMP 12/01/2007   SpO2 98%   BMI 29.45 kg/m      "

## 2020-11-12 NOTE — PROGRESS NOTES
SUBJECTIVE:   CC: Su Tinoco is an 65 year old woman who presents for preventive health visit.       Patient has been advised of split billing requirements and indicates understanding: Yes  Healthy Habits:     Getting at least 3 servings of Calcium per day:  Yes    Bi-annual eye exam:  Yes    Dental care twice a year:  Yes    Sleep apnea or symptoms of sleep apnea:  None    Diet:  Regular (no restrictions), Low salt and Low fat/cholesterol    Frequency of exercise:  4-5 days/week    Duration of exercise:  45-60 minutes    Taking medications regularly:  Yes    Medication side effects:  None    PHQ-2 Total Score: 0    Additional concerns today:  Yes    Ability to successfully perform activities of daily living: Yes, no assistance needed  Home safety:  none identified   Hearing impairment: None      Today's PHQ-2 Score:   PHQ-2 ( 1999 [employer]) 11/10/2020   Q1: Little interest or pleasure in doing things 0   Q2: Feeling down, depressed or hopeless 0   PHQ-2 Score 0   Q1: Little interest or pleasure in doing things Not at all   Q2: Feeling down, depressed or hopeless Not at all   PHQ-2 Score 0       Abuse: Current or Past (Physical, Sexual or Emotional) - No  Do you feel safe in your environment? Yes        Social History     Tobacco Use     Smoking status: Never Smoker     Smokeless tobacco: Never Used   Substance Use Topics     Alcohol use: Yes     Comment: rarely- once a month     If you drink alcohol do you typically have >3 drinks per day or >7 drinks per week? No    Alcohol Use 11/10/2020   Prescreen: >3 drinks/day or >7 drinks/week? No   Prescreen: >3 drinks/day or >7 drinks/week? -   No flowsheet data found.    Reviewed orders with patient.  Reviewed health maintenance and updated orders accordingly - Yes      Mammogram Screening: Patient over age 50, mutual decision to screen reflected in health maintenance.    Pertinent mammograms are reviewed under the imaging tab.  History of abnormal Pap smear: NO -  "age 30-65 PAP every 5 years with negative HPV co-testing recommended     Reviewed and updated as needed this visit by clinical staff                 Problems:   1. Asthma: stable  2. Hypothyroidism; stable  3. Back pain: ongoing back pain   4. Osteopenia: she has been on Fosamax for a year, started after back surgery due to \"soft bone\". Tolerating well     She is still going to OB gyn yearly.     Patient Active Problem List   Diagnosis     Allergic rhinitis     Hypothyroidism     Mild intermittent asthma     CARDIOVASCULAR SCREENING; LDL GOAL LESS THAN 160     Spinal stenosis of lumbar region     Spondylolisthesis at L5-S1 level     Collagenous colitis     Status post lumbar spinal fusion     Osteopenia, unspecified location     Anxiety     Current Outpatient Medications   Medication Sig Dispense Refill     alendronate (FOSAMAX) 70 MG tablet TAKE 1 TABLET(70 MG) BY MOUTH EVERY 7 DAYS 12 tablet 3     Biotin w/ Vitamins C & E (HAIR SKIN & NAILS GUMMIES) 1250-7.5-7.5 MCG-MG-UNT CHEW Take 2 chew tab by mouth daily       Calcium Citrate-Vitamin D (CITRACAL MAXIMUM PO) Take 1,200 Units by mouth 2 times daily       EPINEPHrine (EPIPEN) 0.3 MG/0.3ML injection Inject 0.3 mLs (0.3 mg) into the muscle once as needed for anaphylaxis 0.6 mL 11     fluticasone (FLONASE) 50 MCG/ACT nasal spray Spray 1 spray into both nostrils daily       levocetirizine (XYZAL) 5 MG tablet Take 5 mg by mouth every morning       levothyroxine (SYNTHROID/LEVOTHROID) 88 MCG tablet Take 1 tablet (88 mcg) by mouth daily 90 tablet 2     melatonin 5 MG tablet Take 10 mg by mouth At Bedtime (5MG X 2 = 10MG)       mometasone-formoterol (DULERA) 200-5 MCG/ACT inhaler Take 1 Act by mouth daily       montelukast (SINGULAIR) 10 MG tablet Take 10 mg by mouth At Bedtime       Multiple Vitamins-Minerals (WOMENS MULTI VITAMIN & MINERAL) TABS Take 1 tablet by mouth daily       Probiotic Product (DAILY PROBIOTIC) CAPS Take 1 capsule by mouth daily       traZODone " "(DESYREL) 100 MG tablet Take 1 tablet (100 mg) by mouth At Bedtime 90 tablet 3     VENTOLIN  (90 Base) MCG/ACT inhaler INHALE 1 TO 2 PUFFS INTO THE LUNGS EVERY 6 HOURS AS NEEDED 36 g 0     Vitamin D, Cholecalciferol, 1000 units TABS Take 1,000 Units by mouth daily          Reviewed and updated as needed this visit by Provider                    Review of Systems   Constitutional: Negative for chills and fever.   HENT: Negative for congestion, ear pain, hearing loss and sore throat.    Eyes: Negative for pain and visual disturbance.   Respiratory: Negative for cough and shortness of breath.    Cardiovascular: Negative for chest pain, palpitations and peripheral edema.   Gastrointestinal: Negative for abdominal pain, constipation, diarrhea, heartburn, hematochezia and nausea.   Breasts:  Negative for tenderness, breast mass and discharge.   Genitourinary: Negative for dysuria, frequency, genital sores, hematuria, pelvic pain, urgency, vaginal bleeding and vaginal discharge.   Musculoskeletal: Negative for arthralgias, joint swelling and myalgias.   Skin: Negative for rash.   Neurological: Negative for dizziness, weakness, headaches and paresthesias.   Psychiatric/Behavioral: Negative for mood changes. The patient is not nervous/anxious.      Left ear wax bothering     OBJECTIVE:   LMP 12/01/2007   Physical Exam      Patient alert, in no acute distress  /76 (BP Location: Left arm, Patient Position: Sitting, Cuff Size: Adult Regular)   Pulse 87   Temp 97.7  F (36.5  C) (Oral)   Resp 18   Ht 1.575 m (5' 2\")   Wt 73 kg (161 lb)   LMP 12/01/2007   SpO2 98%   BMI 29.45 kg/m      HEENT: extraocular movements are intact, pupils equal and reactive to light and accommodation, right ear clear, small amount wax left ear removed manually, TM normal  NECK: Neck supple. No adenopathy. Thyroid symmetric, normal size,, Carotids without bruits.  PULMONARY: clear to auscultation  CARDIAC: regular rate and rhythm and " "no murmurs, clicks, or gallops  PULSES: 2/2 throughout  BACK: no spinal or CVAT  ABDOMINAL: Soft, nontender.  Normal bowel sounds.  No hepatosplenomegaly or abnormal masses  BREAST: per gyn   PELVIC: per gyn  REFLEXES: 2+ throughout       Lab Results   Component Value Date    TSH 1.31 09/10/2020    TSH 0.60 05/24/2019    TSH 0.78 02/20/2019    TSH 1.58 04/03/2018    TSH 0.68 09/14/2017          ASSESSMENT/PLAN:   1. Encounter for routine adult health examination without abnormal findings  Discussed immunizations, will consider shingles, will wait on pneumovax until 67    2. Acquired hypothyroidism  Stable, continue current dose    3. Mild intermittent asthma without complication  Stable, continue meds    4. Osteopenia, unspecified location  Stable, continue med, recheck in a year    5. Spondylolisthesis at L5-S1 level  Working with surgery    6. Wax in ear  removed      Patient has been advised of split billing requirements and indicates understanding: Yes  COUNSELING:  Reviewed preventive health counseling, as reflected in patient instructions    Estimated body mass index is 28.34 kg/m  as calculated from the following:    Height as of 11/2/20: 1.6 m (5' 3\").    Weight as of 11/2/20: 72.6 kg (160 lb).    Weight management plan: Discussed healthy diet and exercise guidelines    She reports that she has never smoked. She has never used smokeless tobacco.      Counseling Resources:  ATP IV Guidelines  Pooled Cohorts Equation Calculator  Breast Cancer Risk Calculator  BRCA-Related Cancer Risk Assessment: FHS-7 Tool  FRAX Risk Assessment  ICSI Preventive Guidelines  Dietary Guidelines for Americans, 2010  USDA's MyPlate  ASA Prophylaxis  Lung CA Screening    Jessica Smith MD  [facility]  "

## 2020-11-12 NOTE — LETTER
My Asthma Action Plan    Name: Su Tinoco   YOB: 1955  Date: 11/19/2020   My doctor: Jessica Smith MD   My clinic: [facility]        My Rescue Medicine:   Albuterol inhaler (Proair/Ventolin/Proventil HFA)  2-4 puffs EVERY 4 HOURS as needed. Use a spacer if recommended by your provider.   My Asthma Severity:   Know your asthma triggers:              GREEN ZONE   Good Control    I feel good    No cough or wheeze    Can work, sleep and play without asthma symptoms       Take your asthma control medicine every day.     1. If exercise triggers your asthma, take your rescue medication    15 minutes before exercise or sports, and    During exercise if you have asthma symptoms  2. Spacer to use with inhaler: If you have a spacer, make sure to use it with your inhaler             YELLOW ZONE Getting Worse  I have ANY of these:    I do not feel good    Cough or wheeze    Chest feels tight    Wake up at night   1. Keep taking your Green Zone medications  2. Start taking your rescue medicine:    every 20 minutes for up to 1 hour. Then every 4 hours for 24-48 hours.  3. If you stay in the Yellow Zone for more than 12-24 hours, contact your doctor.  4. If you do not return to the Green Zone in 12-24 hours or you get worse, start taking your oral steroid medicine if prescribed by your provider.           RED ZONE Medical Alert - Get Help  I have ANY of these:    I feel awful    Medicine is not helping    Breathing getting harder    Trouble walking or talking    Nose opens wide to breathe       1. Take your rescue medicine NOW  2. If your provider has prescribed an oral steroid medicine, start taking it NOW  3. Call your doctor NOW  4. If you are still in the Red Zone after 20 minutes and you have not reached your doctor:    Take your rescue medicine again and    Call 911 or go to the emergency room right away    See your regular doctor within 2 weeks of an Emergency Room or Urgent Care  visit for follow-up treatment.          Annual Reminders:  Meet with Asthma Educator,  Flu Shot in the Fall, consider Pneumonia Vaccination for patients with asthma (aged 19 and older).    Pharmacy:    [facility]- [address]  [facility] [place] - [address]  [id number] - [place], [place] - [address] AT [facility] [place] [address]  [facility] - [place], [place] - [address] [id number] - [place], [place] - [address]     Electronically signed by Jessica Smith MD   Date: 11/19/20                    Asthma Triggers  How To Control Things That Make Your Asthma Worse    Triggers are things that make your asthma worse.  Look at the list below to help you find your triggers and   what you can do about them. You can help prevent asthma flare-ups by staying away from your triggers.      Trigger                                                          What you can do   Cigarette Smoke  Tobacco smoke can make asthma worse. Do not allow smoking in your home, car or around you.  Be sure no one smokes at a child s day care or school.  If you smoke, ask your health care provider for ways to help you quit.  Ask family members to quit too.  Ask your health care provider for a referral to Quit Plan to help you quit smoking, or call 8-389-196-PLAN.     Colds, Flu, Bronchitis  These are common triggers of asthma. Wash your hands often.  Don t touch your eyes, nose or mouth.  Get a flu shot every year.     Dust Mites  These are tiny bugs that live in cloth or carpet. They are too small to see. Wash sheets and blankets in hot water every week.   Encase pillows and mattress in dust mite proof covers.  Avoid having carpet if you can. If you have carpet, vacuum weekly.   Use a dust mask and HEPA vacuum.   Pollen and Outdoor Mold  Some people are allergic to trees, grass, or weed pollen, or molds. Try to keep your windows closed.  Limit  time out doors when pollen count is high.   Ask you health care provider about taking medicine during allergy season.     Animal Dander  Some people are allergic to skin flakes, urine or saliva from pets with fur or feathers. Keep pets with fur or feathers out of your home.    If you can t keep the pet outdoors, then keep the pet out of your bedroom.  Keep the bedroom door closed.  Keep pets off cloth furniture and away from stuffed toys.     Mice, Rats, and Cockroaches  Some people are allergic to the waste from these pests.   Cover food and garbage.  Clean up spills and food crumbs.  Store grease in the refrigerator.   Keep food out of the bedroom.   Indoor Mold  This can be a trigger if your home has high moisture. Fix leaking faucets, pipes, or other sources of water.   Clean moldy surfaces.  Dehumidify basement if it is damp and smelly.   Smoke, Strong Odors, and Sprays  These can reduce air quality. Stay away from strong odors and sprays, such as perfume, powder, hair spray, paints, smoke incense, paint, cleaning products, candles and new carpet.   Exercise or Sports  Some people with asthma have this trigger. Be active!  Ask your doctor about taking medicine before sports or exercise to prevent symptoms.    Warm up for 5-10 minutes before and after sports or exercise.     Other Triggers of Asthma  Cold air:  Cover your nose and mouth with a scarf.  Sometimes laughing or crying can be a trigger.  Some medicines and food can trigger asthma.

## 2020-12-03 ENCOUNTER — MYC MEDICAL ADVICE (OUTPATIENT)
Dept: INTERNAL MEDICINE | Facility: CLINIC | Age: 65
End: 2020-12-03

## 2020-12-07 ENCOUNTER — TRANSFERRED RECORDS (OUTPATIENT)
Dept: HEALTH INFORMATION MANAGEMENT | Facility: CLINIC | Age: 65
End: 2020-12-07

## 2020-12-31 ENCOUNTER — TRANSFERRED RECORDS (OUTPATIENT)
Dept: HEALTH INFORMATION MANAGEMENT | Facility: CLINIC | Age: 65
End: 2020-12-31

## 2020-12-31 ENCOUNTER — MYC MEDICAL ADVICE (OUTPATIENT)
Dept: INTERNAL MEDICINE | Facility: CLINIC | Age: 65
End: 2020-12-31

## 2020-12-31 DIAGNOSIS — M85.80 OSTEOPENIA, UNSPECIFIED LOCATION: Primary | ICD-10-CM

## 2021-01-18 RX ORDER — RISEDRONATE SODIUM 150 MG/1
150 TABLET, FILM COATED ORAL
Qty: 3 TABLET | Refills: 3 | Status: SHIPPED | OUTPATIENT
Start: 2021-01-18 | End: 2021-11-16

## 2021-01-25 ENCOUNTER — TRANSFERRED RECORDS (OUTPATIENT)
Dept: HEALTH INFORMATION MANAGEMENT | Facility: CLINIC | Age: 66
End: 2021-01-25

## 2021-02-05 ENCOUNTER — TRANSFERRED RECORDS (OUTPATIENT)
Dept: HEALTH INFORMATION MANAGEMENT | Facility: CLINIC | Age: 66
End: 2021-02-05

## 2021-02-08 ENCOUNTER — TRANSFERRED RECORDS (OUTPATIENT)
Dept: HEALTH INFORMATION MANAGEMENT | Facility: CLINIC | Age: 66
End: 2021-02-08

## 2021-02-10 ENCOUNTER — TRANSFERRED RECORDS (OUTPATIENT)
Dept: HEALTH INFORMATION MANAGEMENT | Facility: CLINIC | Age: 66
End: 2021-02-10

## 2021-02-18 ENCOUNTER — TRANSFERRED RECORDS (OUTPATIENT)
Dept: HEALTH INFORMATION MANAGEMENT | Facility: CLINIC | Age: 66
End: 2021-02-18

## 2021-03-01 ENCOUNTER — TRANSFERRED RECORDS (OUTPATIENT)
Dept: HEALTH INFORMATION MANAGEMENT | Facility: CLINIC | Age: 66
End: 2021-03-01

## 2021-03-09 ENCOUNTER — TRANSFERRED RECORDS (OUTPATIENT)
Dept: HEALTH INFORMATION MANAGEMENT | Facility: CLINIC | Age: 66
End: 2021-03-09

## 2021-03-15 ENCOUNTER — TRANSFERRED RECORDS (OUTPATIENT)
Dept: HEALTH INFORMATION MANAGEMENT | Facility: CLINIC | Age: 66
End: 2021-03-15

## 2021-03-25 ENCOUNTER — APPOINTMENT (OUTPATIENT)
Dept: LAB | Facility: CLINIC | Age: 66
End: 2021-03-25
Payer: COMMERCIAL

## 2021-04-01 ENCOUNTER — VIRTUAL VISIT (OUTPATIENT)
Dept: INTERNAL MEDICINE | Facility: CLINIC | Age: 66
End: 2021-04-01
Payer: COMMERCIAL

## 2021-04-01 DIAGNOSIS — F33.0 MILD RECURRENT MAJOR DEPRESSION (H): Primary | ICD-10-CM

## 2021-04-01 PROCEDURE — 99213 OFFICE O/P EST LOW 20 MIN: CPT | Mod: 95 | Performed by: INTERNAL MEDICINE

## 2021-04-01 RX ORDER — BUPROPION HYDROCHLORIDE 150 MG/1
150 TABLET ORAL EVERY MORNING
Qty: 90 TABLET | Refills: 3 | Status: SHIPPED | OUTPATIENT
Start: 2021-04-01 | End: 2021-11-16

## 2021-04-01 ASSESSMENT — PATIENT HEALTH QUESTIONNAIRE - PHQ9: SUM OF ALL RESPONSES TO PHQ QUESTIONS 1-9: 6

## 2021-04-01 NOTE — PROGRESS NOTES
Su is a 65 year old who is being evaluated via a billable video visit.      How would you like to obtain your AVS? SwiftoharCater to u  If the video visit is dropped, the invitation should be resent by: Text to cell phone: [phone]  Will anyone else be joining your video visit? No    Video Start Time: 12:50    Assessment & Plan     Mild recurrent major depression (H)  She is having some mild symptoms, may be more in the realm of dysthymia.  It is reasonable to restart Wellbutrin at a low dose since that had worked well for her in the past.  She can continue the trazodone.  If she notices worsening anxiety we can discuss other options.  She can follow-up through [facility] if not helping, any worsening symptoms or significant side effects.  - buPROPion (WELLBUTRIN XL) 150 MG 24 hr tablet; Take 1 tablet (150 mg) by mouth every morning      Return in about 7 months (around 11/13/2021) for Physical Exam.    Jessica Smith MD  [facility]   Su is a 65 year old who presents for the following health issues     HPI     Concerns of fatigue and mood problems: She says she believes the fatigue probably is related to mood.  She feels mildly depressed, is not having any anxiety problems now.  She feels like much of the time she does not have rodger in her life.  She had been on sertraline more for anxiety until last year, felt in the past it is not really done much for her depression symptoms.  She was on Wellbutrin in the past, did not tolerate the higher dose, tolerated the lower dose and it seemed to work very well for depression though not as much for anxiety.  She feels she would like to go back on Wellbutrin since she is not having significant anxiety right now and feels this may help boost her mood enough to feel more enjoyment in life.  She does have some family history of depression in her father.  She reports she is sleeping well with the trazodone.  She has been under more stress with her  work relating to the pandemic and feels that is having a lot of impact on the mood.  She would rather start something now than wait till her symptoms get much worse.      Patient Active Problem List   Diagnosis     Allergic rhinitis     Hypothyroidism     Mild intermittent asthma     CARDIOVASCULAR SCREENING; LDL GOAL LESS THAN 160     Spinal stenosis of lumbar region     Spondylolisthesis at L5-S1 level     Collagenous colitis     Status post lumbar spinal fusion     Osteopenia, unspecified location     Anxiety     Current Outpatient Medications   Medication Sig Dispense Refill     Biotin w/ Vitamins C & E (HAIR SKIN & NAILS GUMMIES) 1250-7.5-7.5 MCG-MG-UNT CHEW Take 2 chew tab by mouth daily       Calcium Citrate-Vitamin D (CITRACAL MAXIMUM PO) Take 1,200 Units by mouth 2 times daily       EPINEPHrine (EPIPEN) 0.3 MG/0.3ML injection Inject 0.3 mLs (0.3 mg) into the muscle once as needed for anaphylaxis 0.6 mL 11     fluticasone (FLONASE) 50 MCG/ACT nasal spray Spray 1 spray into both nostrils daily       levocetirizine (XYZAL) 5 MG tablet Take 5 mg by mouth every morning       levothyroxine (SYNTHROID/LEVOTHROID) 88 MCG tablet Take 1 tablet (88 mcg) by mouth daily 90 tablet 2     melatonin 5 MG tablet Take 10 mg by mouth At Bedtime (5MG X 2 = 10MG)       mometasone-formoterol (DULERA) 200-5 MCG/ACT inhaler Take 1 Act by mouth daily       montelukast (SINGULAIR) 10 MG tablet Take 10 mg by mouth At Bedtime       Multiple Vitamins-Minerals (WOMENS MULTI VITAMIN & MINERAL) TABS Take 1 tablet by mouth daily       Probiotic Product (DAILY PROBIOTIC) CAPS Take 1 capsule by mouth daily       RISEdronate (ACTONEL) 150 MG tablet Take 1 tablet (150 mg) by mouth every 30 days 3 tablet 3     traZODone (DESYREL) 100 MG tablet Take 1 tablet (100 mg) by mouth At Bedtime 90 tablet 3     VENTOLIN  (90 Base) MCG/ACT inhaler INHALE 1 TO 2 PUFFS INTO THE LUNGS EVERY 6 HOURS AS NEEDED 36 g 0     Vitamin D, Cholecalciferol, 1000  units TABS Take 1,000 Units by mouth daily       alendronate (FOSAMAX) 70 MG tablet TAKE 1 TABLET(70 MG) BY MOUTH EVERY 7 DAYS (Patient not taking: Reported on 4/1/2021) 12 tablet 3      Social History     Tobacco Use     Smoking status: Never Smoker     Smokeless tobacco: Never Used   Substance Use Topics     Alcohol use: Yes     Comment: rarely- once a month     Drug use: No          Review of Systems   negative      Objective           Vitals:  No vitals were obtained today due to virtual visit.    Physical Exam   GENERAL: Healthy, alert and no distress  EYES: Eyes grossly normal to inspection.  No discharge or erythema, or obvious scleral/conjunctival abnormalities.  RESP: No audible wheeze, cough, or visible cyanosis.  No visible retractions or increased work of breathing.    SKIN: Visible skin clear. No significant rash, abnormal pigmentation or lesions.  NEURO: Cranial nerves grossly intact.  Mentation and speech appropriate for age.  PSYCH: Mentation appears normal, affect normal/bright, judgement and insight intact, normal speech and appearance well-groomed.        PHQ 2/7/2020 7/21/2020 4/1/2021   PHQ-9 Total Score 6 1 6   Q9: Thoughts of better off dead/self-harm past 2 weeks Not at all Not at all Not at all             Video-Visit Details    Type of service:  Video Visit    Video End Time:1:05    Originating Location (pt. Location): Home    Distant Location (provider location):  [facility]     Platform used for Video Visit: Carlos

## 2021-04-19 ENCOUNTER — TRANSFERRED RECORDS (OUTPATIENT)
Dept: HEALTH INFORMATION MANAGEMENT | Facility: CLINIC | Age: 66
End: 2021-04-19

## 2021-04-30 ENCOUNTER — TRANSFERRED RECORDS (OUTPATIENT)
Dept: HEALTH INFORMATION MANAGEMENT | Facility: CLINIC | Age: 66
End: 2021-04-30

## 2021-05-24 ENCOUNTER — MYC MEDICAL ADVICE (OUTPATIENT)
Dept: INTERNAL MEDICINE | Facility: CLINIC | Age: 66
End: 2021-05-24

## 2021-05-24 DIAGNOSIS — J30.1 ALLERGIC RHINITIS DUE TO POLLEN, UNSPECIFIED SEASONALITY: Primary | ICD-10-CM

## 2021-05-24 NOTE — TELEPHONE ENCOUNTER
Please see patient's mychart message below.    Last virtual visit 4-1-21    Please advise, thanks.

## 2021-05-25 RX ORDER — MONTELUKAST SODIUM 10 MG/1
10 TABLET ORAL AT BEDTIME
Qty: 90 TABLET | Refills: 3 | Status: SHIPPED | OUTPATIENT
Start: 2021-05-25 | End: 2021-11-16

## 2021-05-26 ENCOUNTER — TRANSFERRED RECORDS (OUTPATIENT)
Dept: HEALTH INFORMATION MANAGEMENT | Facility: CLINIC | Age: 66
End: 2021-05-26

## 2021-06-30 ENCOUNTER — TRANSFERRED RECORDS (OUTPATIENT)
Dept: HEALTH INFORMATION MANAGEMENT | Facility: CLINIC | Age: 66
End: 2021-06-30

## 2021-09-04 ENCOUNTER — HEALTH MAINTENANCE LETTER (OUTPATIENT)
Age: 66
End: 2021-09-04

## 2021-10-13 ENCOUNTER — MYC MEDICAL ADVICE (OUTPATIENT)
Dept: INTERNAL MEDICINE | Facility: CLINIC | Age: 66
End: 2021-10-13

## 2021-10-13 DIAGNOSIS — E03.9 ACQUIRED HYPOTHYROIDISM: ICD-10-CM

## 2021-10-13 DIAGNOSIS — G47.00 INSOMNIA, UNSPECIFIED TYPE: ICD-10-CM

## 2021-10-14 RX ORDER — LEVOTHYROXINE SODIUM 88 UG/1
88 TABLET ORAL DAILY
Qty: 90 TABLET | Refills: 0 | Status: SHIPPED | OUTPATIENT
Start: 2021-10-14 | End: 2021-11-16

## 2021-10-14 RX ORDER — TRAZODONE HYDROCHLORIDE 100 MG/1
100 TABLET ORAL AT BEDTIME
Qty: 90 TABLET | Refills: 0 | Status: SHIPPED | OUTPATIENT
Start: 2021-10-14 | End: 2021-11-16

## 2021-10-14 NOTE — TELEPHONE ENCOUNTER
Pt has upcoming appt.     Prescription approved per [facility] Refill Protocol.    TSH   Date Value Ref Range Status   09/10/2020 1.31 0.40 - 4.00 mU/L Final

## 2021-10-18 ENCOUNTER — TRANSFERRED RECORDS (OUTPATIENT)
Dept: HEALTH INFORMATION MANAGEMENT | Facility: CLINIC | Age: 66
End: 2021-10-18
Payer: COMMERCIAL

## 2021-10-28 ENCOUNTER — TRANSFERRED RECORDS (OUTPATIENT)
Dept: HEALTH INFORMATION MANAGEMENT | Facility: CLINIC | Age: 66
End: 2021-10-28
Payer: COMMERCIAL

## 2021-10-29 ENCOUNTER — TRANSFERRED RECORDS (OUTPATIENT)
Dept: HEALTH INFORMATION MANAGEMENT | Facility: CLINIC | Age: 66
End: 2021-10-29
Payer: COMMERCIAL

## 2021-11-16 ENCOUNTER — OFFICE VISIT (OUTPATIENT)
Dept: INTERNAL MEDICINE | Facility: CLINIC | Age: 66
End: 2021-11-16
Payer: COMMERCIAL

## 2021-11-16 VITALS
BODY MASS INDEX: 28.87 KG/M2 | HEART RATE: 79 BPM | SYSTOLIC BLOOD PRESSURE: 123 MMHG | DIASTOLIC BLOOD PRESSURE: 75 MMHG | TEMPERATURE: 97.7 F | HEIGHT: 62 IN | OXYGEN SATURATION: 98 % | WEIGHT: 156.9 LBS | RESPIRATION RATE: 20 BRPM

## 2021-11-16 DIAGNOSIS — E03.9 ACQUIRED HYPOTHYROIDISM: ICD-10-CM

## 2021-11-16 DIAGNOSIS — M85.80 OSTEOPENIA, UNSPECIFIED LOCATION: ICD-10-CM

## 2021-11-16 DIAGNOSIS — Z13.6 CARDIOVASCULAR SCREENING; LDL GOAL LESS THAN 130: ICD-10-CM

## 2021-11-16 DIAGNOSIS — F32.5 MAJOR DEPRESSION IN COMPLETE REMISSION (H): ICD-10-CM

## 2021-11-16 DIAGNOSIS — Z00.00 ENCOUNTER FOR ROUTINE ADULT HEALTH EXAMINATION WITHOUT ABNORMAL FINDINGS: Primary | ICD-10-CM

## 2021-11-16 DIAGNOSIS — G47.00 INSOMNIA, UNSPECIFIED TYPE: ICD-10-CM

## 2021-11-16 DIAGNOSIS — J45.20 MILD INTERMITTENT ASTHMA WITHOUT COMPLICATION: ICD-10-CM

## 2021-11-16 DIAGNOSIS — Z13.1 SCREENING FOR DIABETES MELLITUS: ICD-10-CM

## 2021-11-16 DIAGNOSIS — J30.1 ALLERGIC RHINITIS DUE TO POLLEN, UNSPECIFIED SEASONALITY: ICD-10-CM

## 2021-11-16 DIAGNOSIS — F41.9 ANXIETY: ICD-10-CM

## 2021-11-16 LAB
ANION GAP SERPL CALCULATED.3IONS-SCNC: 3 MMOL/L (ref 3–14)
BUN SERPL-MCNC: 17 MG/DL (ref 7–30)
CALCIUM SERPL-MCNC: 9.4 MG/DL (ref 8.5–10.1)
CHLORIDE BLD-SCNC: 105 MMOL/L (ref 94–109)
CHOLEST SERPL-MCNC: 208 MG/DL
CO2 SERPL-SCNC: 31 MMOL/L (ref 20–32)
CREAT SERPL-MCNC: 0.76 MG/DL (ref 0.52–1.04)
FASTING STATUS PATIENT QL REPORTED: YES
GFR SERPL CREATININE-BSD FRML MDRD: 83 ML/MIN/1.73M2
GLUCOSE BLD-MCNC: 94 MG/DL (ref 70–99)
HDLC SERPL-MCNC: 100 MG/DL
LDLC SERPL CALC-MCNC: 89 MG/DL
NONHDLC SERPL-MCNC: 108 MG/DL
POTASSIUM BLD-SCNC: 4.1 MMOL/L (ref 3.4–5.3)
SODIUM SERPL-SCNC: 139 MMOL/L (ref 133–144)
TRIGL SERPL-MCNC: 96 MG/DL
TSH SERPL DL<=0.005 MIU/L-ACNC: 2 MU/L (ref 0.4–4)

## 2021-11-16 PROCEDURE — 96127 BRIEF EMOTIONAL/BEHAV ASSMT: CPT | Mod: 59 | Performed by: INTERNAL MEDICINE

## 2021-11-16 PROCEDURE — 99396 PREV VISIT EST AGE 40-64: CPT | Mod: 25 | Performed by: INTERNAL MEDICINE

## 2021-11-16 PROCEDURE — 36415 COLL VENOUS BLD VENIPUNCTURE: CPT | Performed by: INTERNAL MEDICINE

## 2021-11-16 PROCEDURE — 99214 OFFICE O/P EST MOD 30 MIN: CPT | Mod: 25 | Performed by: INTERNAL MEDICINE

## 2021-11-16 PROCEDURE — 80048 BASIC METABOLIC PNL TOTAL CA: CPT | Performed by: INTERNAL MEDICINE

## 2021-11-16 PROCEDURE — 90682 RIV4 VACC RECOMBINANT DNA IM: CPT | Performed by: INTERNAL MEDICINE

## 2021-11-16 PROCEDURE — 90471 IMMUNIZATION ADMIN: CPT | Performed by: INTERNAL MEDICINE

## 2021-11-16 PROCEDURE — 84443 ASSAY THYROID STIM HORMONE: CPT | Performed by: INTERNAL MEDICINE

## 2021-11-16 PROCEDURE — 80061 LIPID PANEL: CPT | Performed by: INTERNAL MEDICINE

## 2021-11-16 RX ORDER — RISEDRONATE SODIUM 150 MG/1
150 TABLET, FILM COATED ORAL
Qty: 3 TABLET | Refills: 3 | Status: SHIPPED | OUTPATIENT
Start: 2021-11-16 | End: 2022-12-02

## 2021-11-16 RX ORDER — BUPROPION HYDROCHLORIDE 150 MG/1
150 TABLET ORAL EVERY MORNING
Qty: 90 TABLET | Refills: 3 | Status: SHIPPED | OUTPATIENT
Start: 2021-11-16 | End: 2022-07-12

## 2021-11-16 RX ORDER — LEVOTHYROXINE SODIUM 88 UG/1
88 TABLET ORAL DAILY
Qty: 90 TABLET | Refills: 3 | Status: SHIPPED | OUTPATIENT
Start: 2021-11-16 | End: 2022-12-02

## 2021-11-16 RX ORDER — MONTELUKAST SODIUM 10 MG/1
10 TABLET ORAL AT BEDTIME
Qty: 90 TABLET | Refills: 3 | Status: SHIPPED | OUTPATIENT
Start: 2021-11-16 | End: 2022-12-02

## 2021-11-16 RX ORDER — TRAZODONE HYDROCHLORIDE 100 MG/1
100 TABLET ORAL AT BEDTIME
Qty: 90 TABLET | Refills: 3 | Status: SHIPPED | OUTPATIENT
Start: 2021-11-16 | End: 2022-12-02

## 2021-11-16 ASSESSMENT — ENCOUNTER SYMPTOMS
FREQUENCY: 0
HEADACHES: 0
FEVER: 0
COUGH: 0
SORE THROAT: 0
HEARTBURN: 0
NAUSEA: 0
SHORTNESS OF BREATH: 0
DIZZINESS: 0
MYALGIAS: 0
CONSTIPATION: 0
NERVOUS/ANXIOUS: 0
ARTHRALGIAS: 0
HEMATURIA: 0
JOINT SWELLING: 0
ABDOMINAL PAIN: 0
PARESTHESIAS: 0
CHILLS: 0
HEMATOCHEZIA: 0
EYE PAIN: 0
DYSURIA: 0
BREAST MASS: 0
WEAKNESS: 0
PALPITATIONS: 0
DIARRHEA: 0

## 2021-11-16 ASSESSMENT — PATIENT HEALTH QUESTIONNAIRE - PHQ9: 5. POOR APPETITE OR OVEREATING: NOT AT ALL

## 2021-11-16 ASSESSMENT — ANXIETY QUESTIONNAIRES
5. BEING SO RESTLESS THAT IT IS HARD TO SIT STILL: NOT AT ALL
1. FEELING NERVOUS, ANXIOUS, OR ON EDGE: NOT AT ALL
6. BECOMING EASILY ANNOYED OR IRRITABLE: NOT AT ALL
3. WORRYING TOO MUCH ABOUT DIFFERENT THINGS: NOT AT ALL
GAD7 TOTAL SCORE: 0
2. NOT BEING ABLE TO STOP OR CONTROL WORRYING: NOT AT ALL
7. FEELING AFRAID AS IF SOMETHING AWFUL MIGHT HAPPEN: NOT AT ALL

## 2021-11-16 ASSESSMENT — MIFFLIN-ST. JEOR: SCORE: 1214.94

## 2021-11-16 NOTE — Clinical Note
Please abstract the following data from this visit with this patient into the appropriate field in Epic:    Tests that can be patient reported without a hard copy:    Pap smear done on this date: 6/30/21 (approximately), by this group: [facility], results were normal.

## 2021-11-16 NOTE — PROGRESS NOTES
SUBJECTIVE:   CC: Su Tinoco is an 66 year old woman who presents for preventive health visit.       Patient has been advised of split billing requirements and indicates understanding: Yes  Healthy Habits:     Getting at least 3 servings of Calcium per day:  Yes    Bi-annual eye exam:  Yes    Dental care twice a year:  Yes    Sleep apnea or symptoms of sleep apnea:  None    Diet:  Low salt, Low fat/cholesterol and Gluten-free/reduced    Frequency of exercise:  6-7 days/week    Duration of exercise:  30-45 minutes    Taking medications regularly:  Yes    Medication side effects:  None    PHQ-2 Total Score: 0    Additional concerns today:  No  Ability to successfully perform activities of daily living: Yes, no assistance needed  Home safety:  none identified   Hearing impairment: None       Problems:  1.  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  2.  Asthma: Doing well  3.  Colitis: Some episodic symptoms  4.  Spinal stenosis, degenerative lumbar spine disease: She has had some flare, working with spine and physical therapy.  She brings in copy of recent MRI  5.  Insomnia: Stable on medication  6.  Hypothyroidism: Clinically stable  7.  Osteopenia: Stable on medication      Today's PHQ-2 Score:   PHQ-2 ( 1999 [employer]) 11/16/2021   Q1: Little interest or pleasure in doing things 0   Q2: Feeling down, depressed or hopeless 0   PHQ-2 Score 0   Q1: Little interest or pleasure in doing things Not at all   Q2: Feeling down, depressed or hopeless Not at all   PHQ-2 Score 0       Abuse: Current or Past (Physical, Sexual or Emotional) - No  Do you feel safe in your environment? Yes        Social History     Tobacco Use     Smoking status: Never Smoker     Smokeless tobacco: Never Used   Substance Use Topics     Alcohol use: Yes     Comment: rarely- once a month     If you drink  alcohol do you typically have >3 drinks per day or >7 drinks per week? No    Alcohol Use 11/16/2021   Prescreen: >3 drinks/day or >7 drinks/week? No   Prescreen: >3 drinks/day or >7 drinks/week? -   No flowsheet data found.    Reviewed orders with patient.  Reviewed health maintenance and updated orders accordingly - Yes      Breast Cancer Screening:    FHS-7:   Breast CA Risk Assessment (FHS-7) 11/16/2021   Did any of your first-degree relatives have breast or ovarian cancer? Yes   Did any of your relatives have bilateral breast cancer? Yes   Did any man in your family have breast cancer? No   Did any woman in your family have breast and ovarian cancer? Yes   Did any woman in your family have breast cancer before age 50 y? Yes   Do you have 2 or more relatives with breast and/or ovarian cancer? No   Do you have 2 or more relatives with breast and/or bowel cancer? No     click delete button to remove this line now  Mammogram Screening: Recommended mammography every 1-2 years with patient discussion and risk factor consideration  Pertinent mammograms are reviewed under the imaging tab.    History of abnormal Pap smear: NO - age 30-65 PAP every 5 years with negative HPV co-testing recommended     Reviewed and updated as needed this visit by clinical staff                Reviewed and updated as needed this visit by Provider                 Review of Systems   Constitutional: Negative for chills and fever.   HENT: Negative for congestion, ear pain, hearing loss and sore throat.    Eyes: Negative for pain and visual disturbance.   Respiratory: Negative for cough and shortness of breath.    Cardiovascular: Negative for chest pain, palpitations and peripheral edema.   Gastrointestinal: Negative for abdominal pain, constipation, diarrhea, heartburn, hematochezia and nausea.   Breasts:  Negative for tenderness, breast mass and discharge.   Genitourinary: Negative for dysuria, frequency, genital sores, hematuria, pelvic pain,  "urgency, vaginal bleeding and vaginal discharge.   Musculoskeletal: Negative for arthralgias, joint swelling and myalgias.   Skin: Negative for rash.   Neurological: Negative for dizziness, weakness, headaches and paresthesias.   Psychiatric/Behavioral: Negative for mood changes. The patient is not nervous/anxious.           OBJECTIVE:   /75 (BP Location: Left arm, Patient Position: Sitting, Cuff Size: Adult Regular)   Pulse 79   Temp 97.7  F (36.5  C) (Oral)   Resp 20   Ht 1.575 m (5' 2\")   Wt 71.2 kg (156 lb 14.4 oz)   LMP 12/01/2007   SpO2 98%   BMI 28.70 kg/m    Physical Exam  HEENT: extraocular movements are intact, pupils equal and reactive to light and accommodation, TMs clear  NECK: Neck supple. No adenopathy. Thyroid symmetric, normal size,, Carotids without bruits.  PULMONARY: clear to auscultation  CARDIAC: regular rate and rhythm and no murmurs, clicks, or gallops  PULSES: 2/2 throughout  BACK: no spinal or CVAT  ABDOMINAL: Soft, nontender.  Normal bowel sounds.  No hepatosplenomegaly or abnormal masses  BREAST: No breast masses or tenderness, No axillary masses or tenderness and No galactorrhea  REFLEXES: 2+ throughout      ASSESSMENT/PLAN:   (Z00.00) Encounter for routine adult health examination without abnormal findings  (primary encounter diagnosis)  Comment: She will get her Covid vaccine later, advised about shingles vaccine and she may consider this in the future  Plan:     (F32.5) Major depression in complete remission (H)  Comment: Well-controlled, continue medication  Plan: buPROPion (WELLBUTRIN XL) 150 MG 24 hr tablet,         EMOTIONAL / BEHAVIORAL ASSESSMENT            (E03.9) Acquired hypothyroidism  Comment:  stable clinically,  Plan: levothyroxine (SYNTHROID/LEVOTHROID) 88 MCG         tablet, TSH with free T4 reflex            (J45.20) Mild intermittent asthma without complication  Comment: Well-controlled  Plan: Continue medication    (M85.80) Osteopenia, unspecified " "location  Comment: Stable on medication, due for bone density  Plan: DX Hip/Pelvis/Spine, RISEdronate (ACTONEL) 150         MG tablet            (F41.9) Anxiety  Comment: Stable,  Plan: EMOTIONAL / BEHAVIORAL ASSESSMENT         continue medication    (J30.1) Allergic rhinitis due to pollen, unspecified seasonality  Comment:   Plan: montelukast (SINGULAIR) 10 MG tablet            (G47.00) Insomnia, unspecified type  Comment:   Plan: traZODone (DESYREL) 100 MG tablet            (Z13.6) CARDIOVASCULAR SCREENING; LDL GOAL LESS THAN 130  Comment:   Plan: Lipid panel reflex to direct LDL Fasting            (Z13.1) Screening for diabetes mellitus  Comment:   Plan: Basic metabolic panel  (Ca, Cl, CO2, Creat,         Gluc, K, Na, BUN)              Patient has been advised of split billing requirements and indicates understanding: Yes  COUNSELING:  Reviewed preventive health counseling, as reflected in patient instructions    Estimated body mass index is 28.7 kg/m  as calculated from the following:    Height as of this encounter: 1.575 m (5' 2\").    Weight as of this encounter: 71.2 kg (156 lb 14.4 oz).    Weight management plan: Discussed healthy diet and exercise guidelines    She reports that she has never smoked. She has never used smokeless tobacco.      Counseling Resources:  ATP IV Guidelines  Pooled Cohorts Equation Calculator  Breast Cancer Risk Calculator  BRCA-Related Cancer Risk Assessment: FHS-7 Tool  FRAX Risk Assessment  ICSI Preventive Guidelines  Dietary Guidelines for Americans, 2010  USDA's MyPlate  ASA Prophylaxis  Lung CA Screening    Jessica Smith MD  [facility]  "

## 2021-11-17 ASSESSMENT — ASTHMA QUESTIONNAIRES: ACT_TOTALSCORE: 23

## 2021-11-17 ASSESSMENT — ANXIETY QUESTIONNAIRES: GAD7 TOTAL SCORE: 0

## 2021-11-17 ASSESSMENT — PATIENT HEALTH QUESTIONNAIRE - PHQ9: SUM OF ALL RESPONSES TO PHQ QUESTIONS 1-9: 0

## 2021-12-07 ENCOUNTER — ANCILLARY PROCEDURE (OUTPATIENT)
Dept: BONE DENSITY | Facility: CLINIC | Age: 66
End: 2021-12-07
Payer: COMMERCIAL

## 2021-12-07 ENCOUNTER — ANCILLARY PROCEDURE (OUTPATIENT)
Dept: BONE DENSITY | Facility: CLINIC | Age: 66
End: 2021-12-07
Attending: INTERNAL MEDICINE
Payer: COMMERCIAL

## 2021-12-07 DIAGNOSIS — Z78.0 ASYMPTOMATIC MENOPAUSAL STATE: ICD-10-CM

## 2021-12-07 DIAGNOSIS — M85.80 OSTEOPENIA, UNSPECIFIED LOCATION: ICD-10-CM

## 2021-12-07 PROCEDURE — 77081 DXA BONE DENSITY APPENDICULR: CPT | Mod: 59 | Performed by: INTERNAL MEDICINE

## 2021-12-07 PROCEDURE — 77080 DXA BONE DENSITY AXIAL: CPT | Performed by: INTERNAL MEDICINE

## 2021-12-27 ENCOUNTER — IMMUNIZATION (OUTPATIENT)
Dept: NURSING | Facility: CLINIC | Age: 66
End: 2021-12-27
Payer: COMMERCIAL

## 2021-12-27 DIAGNOSIS — Z23 NEED FOR VACCINATION: Primary | ICD-10-CM

## 2021-12-27 PROCEDURE — 91300 PR COVID VAC PFIZER DIL RECON 30 MCG/0.3 ML IM: CPT

## 2021-12-27 PROCEDURE — 0003A PR COVID VAC PFIZER DIL RECON 30 MCG/0.3 ML IM: CPT

## 2021-12-27 PROCEDURE — 99207 PR NO CHARGE NURSE ONLY: CPT

## 2022-03-02 ENCOUNTER — NURSE TRIAGE (OUTPATIENT)
Dept: NURSING | Facility: CLINIC | Age: 67
End: 2022-03-02
Payer: COMMERCIAL

## 2022-03-02 NOTE — TELEPHONE ENCOUNTER
"Caller reports that she  Has been in [place] for  past  3 weeks; stopped wellbutrin  'cold turkey\"  on 02/03  because she forgot it.   Currently has had  a headache for  2.5 weeks that   goes away with Advil   Today  she  took her  BP and it was  165/106 & 171/93 HR of 83    Caller has abeen dvised o beseen in clinic this week ithin 3 days;   No scheuled appointmnt available   Will route to  [facility] triage nurse an PCP to reviwe andaddress     Patient advised to call back of BP >180 systolic 110 diastolic or any new or worrseninsg symptoms and to take BP a.m. and p.m. and  report when seen   Caller understands and will comply   Lillian Roth RN  FNA                                   Reason for Disposition    Systolic BP >= 160 OR Diastolic >= 100    Additional Information    Negative: Sounds like a life-threatening emergency to the triager    Negative: Pregnant > 20 weeks or postpartum (< 6 weeks after delivery) and new hand or face swelling    Negative: Pregnant > 20 weeks and BP > 140/90    Negative: Systolic BP >= 160 OR Diastolic >= 100, and any cardiac or neurologic symptoms (e.g., chest pain, difficulty breathing, unsteady gait, blurred vision)    Negative: Patient sounds very sick or weak to the triager    Negative: BP Systolic BP >= 140 OR Diastolic >= 90 and postpartum (from 0 to 6 weeks after delivery)    Negative: Systolic BP >= 180 OR Diastolic >= 110, and missed most recent dose of blood pressure medication    Negative: Systolic BP >= 180 OR Diastolic >= 110    Negative: Patient wants to be seen    Negative: Ran out of BP medications    Negative: Taking BP medications and feels is having side effects (e.g., impotence, cough, dizziness)    Protocols used: HIGH BLOOD PRESSURE-A-OH      "

## 2022-03-03 ENCOUNTER — OFFICE VISIT (OUTPATIENT)
Dept: INTERNAL MEDICINE | Facility: CLINIC | Age: 67
End: 2022-03-03
Payer: COMMERCIAL

## 2022-03-03 ENCOUNTER — NURSE TRIAGE (OUTPATIENT)
Dept: NURSING | Facility: CLINIC | Age: 67
End: 2022-03-03
Payer: COMMERCIAL

## 2022-03-03 VITALS
SYSTOLIC BLOOD PRESSURE: 124 MMHG | HEART RATE: 84 BPM | DIASTOLIC BLOOD PRESSURE: 70 MMHG | WEIGHT: 161.1 LBS | BODY MASS INDEX: 29.64 KG/M2 | RESPIRATION RATE: 16 BRPM | TEMPERATURE: 97.9 F | HEIGHT: 62 IN | OXYGEN SATURATION: 98 %

## 2022-03-03 DIAGNOSIS — F41.9 ANXIETY: Primary | ICD-10-CM

## 2022-03-03 DIAGNOSIS — J45.20 MILD INTERMITTENT ASTHMA, UNCOMPLICATED: ICD-10-CM

## 2022-03-03 PROCEDURE — 99213 OFFICE O/P EST LOW 20 MIN: CPT | Performed by: NURSE PRACTITIONER

## 2022-03-03 RX ORDER — ALBUTEROL SULFATE 90 UG/1
AEROSOL, METERED RESPIRATORY (INHALATION)
Qty: 36 G | Refills: 0 | Status: SHIPPED | OUTPATIENT
Start: 2022-03-03 | End: 2022-12-02

## 2022-03-03 NOTE — PROGRESS NOTES
Assessment & Plan     Anxiety      Mild intermittent asthma, uncomplicated    - albuterol (VENTOLIN HFA) 108 (90 Base) MCG/ACT inhaler; INHALE 1 TO 2 PUFFS INTO THE LUNGS EVERY 6 HOURS AS NEEDED    Restart wellbutrin, has counseling scheduled  F/u PCP 4-6 weeks             Chary Alex NP  [facility] Y[facility]HAIRA Blue is a 66 year old who presents for the following health issues     HPI     Anxiety Follow-Up    How are you doing with your anxiety since your last visit? Worsened, off wellbutrin x 1 month    Are you having other symptoms that might be associated with anxiety? Yes:  headache    Have you had a significant life event? Job Concerns     Are you feeling depressed? No    Do you have any concerns with your use of alcohol or other drugs? No    Social History     Tobacco Use     Smoking status: Never Smoker     Smokeless tobacco: Never Used   Vaping Use     Vaping Use: Never used   Substance Use Topics     Alcohol use: Yes     Comment: rarely- once a month     Drug use: No     BRONWYN-7 SCORE 2/7/2020 7/21/2020 11/16/2021   Total Score - - -   Total Score - - -   Total Score 15 2 0     PHQ 7/21/2020 4/1/2021 11/16/2021   PHQ-9 Total Score 1 6 0   Q9: Thoughts of better off dead/self-harm past 2 weeks Not at all Not at all Not at all     Last PHQ-9 11/16/2021   1.  Little interest or pleasure in doing things 0   2.  Feeling down, depressed, or hopeless 0   3.  Trouble falling or staying asleep, or sleeping too much 0   4.  Feeling tired or having little energy 0   5.  Poor appetite or overeating 0   6.  Feeling bad about yourself 0   7.  Trouble concentrating 0   8.  Moving slowly or restless 0   Q9: Thoughts of better off dead/self-harm past 2 weeks 0   PHQ-9 Total Score 0   Difficulty at work, home, or with people -     BRONWYN-7  11/16/2021   1. Feeling nervous, anxious, or on edge 0   2. Not being able to stop or control worrying 0   3. Worrying too much about different things 0  "  4. Trouble relaxing 0   5. Being so restless that it is hard to sit still 0   6. Becoming easily annoyed or irritable 0   7. Feeling afraid, as if something awful might happen 0   BRONWYN-7 Total Score 0   If you checked any problems, how difficult have they made it for you to do your work, take care of things at home, or get along with other people? -           Review of Systems   Constitutional, HEENT, cardiovascular, pulmonary, gi and gu systems are negative, except as otherwise noted.      Objective    /70   Pulse 84   Temp 97.9  F (36.6  C) (Tympanic)   Resp 16   Ht 1.575 m (5' 2\")   Wt 73.1 kg (161 lb 1.6 oz)   LMP 12/01/2007   SpO2 98%   BMI 29.47 kg/m    Body mass index is 29.47 kg/m .  Physical Exam   GENERAL: healthy, alert and no distress  PSYCH: mentation appears normal, anxious and speech pressured                "

## 2022-03-03 NOTE — TELEPHONE ENCOUNTER
Talked to Lillian. IN [place] vacationing and working. Got an headache. Was on Welbutrin but was going to taper off and forgot the med at home. Stopping abruptly isn't good. Tried to find a blood pressure machine. Got home Monday and took 165/106 yesterday. Later it was 171/93. Went to bed. Talked to Domenica and told to relax and take Tylenol. Woke at 11:15 p.m. had pounding heart. Tried to calm down. Still not feeling good. 134/80 this morning. Headache again. Took off of work. Under a lot of stress at work right now and has been for some months now. I connected with scheduling for an appointment today or tomorrow.    Nurse Triage SBAR    Is this a 2nd Level Triage? NO    Situation Headaches since being off of welbutrin suddenly. Forgot to bring medication to wean off of it, while on vacation. :    Background Forgot to bring medication with her on vacation and decided not to call for a refill while gone. :     Assessment Headaches possibly from sudden stopping of welbutrin. She is also under a lot of stress at work.  :    (See information below for more triage details.)    Recommendation Nothing, connected with scheduling for an appointment and recommended urgent care if they can't get her into the clinic.  : Routing to provider for recommendation on appointment needed. Urgent care recommended if appointment not available.    Protocol Recommended Disposition: Routine office follow-up appointment   Shaniqua Sandoval RN  [facility]    Reason for Disposition    Unexplained headache that is present > 24 hours    Additional Information    Negative: Difficult to awaken or acting confused (e.g., disoriented, slurred speech)    Negative: Weakness of the face, arm or leg on one side of the body and new onset    Negative: Numbness of the face, arm or leg on one side of the body and new onset    Negative: Loss of speech or garbled speech and new onset    Negative: Passed out (i.e., fainted, collapsed and was not  responding)    Negative: Sounds like a life-threatening emergency to the triager    Negative: Followed a head injury within last 3 days    Negative: Traumatic Brain Injury (TBI) is suspected    Negative: Sinus pain of forehead and yellow or green nasal discharge    Negative: Pregnant    Negative: Unable to walk without falling    Negative: Stiff neck (can't touch chin to chest)    Negative: Possibility of carbon monoxide exposure    Negative: SEVERE headache, states 'worst headache' of life     Pain at 3 this morning.    Negative: SEVERE headache, sudden-onset (i.e., reaching maximum intensity within seconds to 1 hour)    Negative: Severe pain in one eye    Negative: Loss of vision or double vision (Exception: same as prior migraines)    Negative: Patient sounds very sick or weak to the triager    Negative: Fever > 103 F (39.4 C)    Negative: Fever > 100.0 F (37.8 C) and has diabetes mellitus or a weak immune system (e.g., HIV positive, cancer chemotherapy, organ transplant, splenectomy, chronic steroids)    Negative: SEVERE headache (e.g., excruciating) and has had severe headaches before    Negative: SEVERE headache and not relieved by pain meds    Negative: SEVERE headache and vomiting    Negative: SEVERE headache and fever    Negative: New headache and weak immune system (e.g., HIV positive, cancer chemo, splenectomy, organ transplant, chronic steroids)    Negative: Fever present > 3 days (72 hours)    Negative: Patient wants to be seen    Protocols used: HEADACHE-A-OH

## 2022-03-03 NOTE — NURSING NOTE
"patient has been having headaches, high bp and palpatations.  Vital signs:  Temp: 97.9  F (36.6  C) Temp src: Tympanic BP: 124/70 Pulse: 84   Resp: 16 SpO2: 98 %     Height: 157.5 cm (5' 2\") Weight: 73.1 kg (161 lb 1.6 oz)  Estimated body mass index is 29.47 kg/m  as calculated from the following:    Height as of this encounter: 1.575 m (5' 2\").    Weight as of this encounter: 73.1 kg (161 lb 1.6 oz).          "

## 2022-05-31 ENCOUNTER — TRANSFERRED RECORDS (OUTPATIENT)
Dept: HEALTH INFORMATION MANAGEMENT | Facility: CLINIC | Age: 67
End: 2022-05-31
Payer: COMMERCIAL

## 2022-07-01 ENCOUNTER — TRANSFERRED RECORDS (OUTPATIENT)
Dept: HEALTH INFORMATION MANAGEMENT | Facility: CLINIC | Age: 67
End: 2022-07-01

## 2022-07-10 ENCOUNTER — OFFICE VISIT (OUTPATIENT)
Dept: URGENT CARE | Facility: URGENT CARE | Age: 67
End: 2022-07-10
Payer: COMMERCIAL

## 2022-07-10 VITALS
HEART RATE: 78 BPM | TEMPERATURE: 99.2 F | RESPIRATION RATE: 12 BRPM | OXYGEN SATURATION: 98 % | DIASTOLIC BLOOD PRESSURE: 72 MMHG | SYSTOLIC BLOOD PRESSURE: 114 MMHG | WEIGHT: 159 LBS | BODY MASS INDEX: 29.08 KG/M2

## 2022-07-10 DIAGNOSIS — J02.9 ACUTE PHARYNGITIS, UNSPECIFIED ETIOLOGY: Primary | ICD-10-CM

## 2022-07-10 DIAGNOSIS — J04.0 LARYNGITIS: ICD-10-CM

## 2022-07-10 LAB
DEPRECATED S PYO AG THROAT QL EIA: NEGATIVE
GROUP A STREP BY PCR: NOT DETECTED
SARS-COV-2 RNA RESP QL NAA+PROBE: POSITIVE

## 2022-07-10 PROCEDURE — 99213 OFFICE O/P EST LOW 20 MIN: CPT | Performed by: FAMILY MEDICINE

## 2022-07-10 PROCEDURE — 87651 STREP A DNA AMP PROBE: CPT | Performed by: FAMILY MEDICINE

## 2022-07-10 PROCEDURE — U0005 INFEC AGEN DETEC AMPLI PROBE: HCPCS | Performed by: FAMILY MEDICINE

## 2022-07-10 PROCEDURE — U0003 INFECTIOUS AGENT DETECTION BY NUCLEIC ACID (DNA OR RNA); SEVERE ACUTE RESPIRATORY SYNDROME CORONAVIRUS 2 (SARS-COV-2) (CORONAVIRUS DISEASE [COVID-19]), AMPLIFIED PROBE TECHNIQUE, MAKING USE OF HIGH THROUGHPUT TECHNOLOGIES AS DESCRIBED BY CMS-2020-01-R: HCPCS | Performed by: FAMILY MEDICINE

## 2022-07-10 NOTE — NURSING NOTE
"Chief Complaint   Patient presents with     Urgent Care     Extremely sore throat since Friday night.  Also c/o of ear pain today.  She has had the covid vaccine and booster.  She has not had a fever.     Initial /72 (BP Location: Right arm, Patient Position: Sitting, Cuff Size: Adult Regular)   Pulse 78   Temp 99.2  F (37.3  C) (Tympanic)   Resp 12   Wt 72.1 kg (159 lb)   LMP 12/01/2007   SpO2 98%   BMI 29.08 kg/m   Estimated body mass index is 29.08 kg/m  as calculated from the following:    Height as of 3/3/22: 1.575 m (5' 2\").    Weight as of this encounter: 72.1 kg (159 lb)..  BP completed using cuff size: regular  Alexia Cleaning R.N.    "

## 2022-07-10 NOTE — PROGRESS NOTES
Chief Complaint   Patient presents with     Urgent Care     Extremely sore throat since Friday night.  Also c/o of ear pain today.  She has had the covid vaccine and booster.  She has not had a fever.       ASSESMENT AND PLAN   Su was seen today for urgent care.    Diagnoses and all orders for this visit:    Acute pharyngitis, unspecified etiology  -     Streptococcus A Rapid Screen w/Reflex to PCR - Clinic Collect  -     Symptomatic; Unknown COVID-19 Virus (Coronavirus) by PCR Nose  -     Group A Streptococcus PCR Throat Swab    Laryngitis      Results for orders placed or performed in visit on 07/10/22   Streptococcus A Rapid Screen w/Reflex to PCR - Clinic Collect     Status: Normal    Specimen: Throat; Swab   Result Value Ref Range    Group A Strep antigen Negative Negative         Tylenol, Fluids and Saline gargles      Initial differential diagnosis included but was not restricted to   Differential Diagnosis:  URI Adult/Peds:  Sinusitis, Strep pharyngitis, Tonsilitis, Viral pharyngitis, Viral syndrome and Viral upper respiratory illness  Medical Decision Making:  Su Tinoco is a 67 year old female present with sorethroat   Has been noticing some hoarseness in voice too , has been doing tylenol , ibuprofen for the pain   As no exudate noted     Routine discharge counseling was given to the patient and the patient understands that worsening, changing or persistent symptoms should prompt an immediate call or follow up with their primary physician or the emergency department. The importance of appropriate follow up was also discussed with the patient.     I have reviewed the nursing notes.  Review of the result(s) of each unique test -   X-Ray was not done.    Time  spent on the date of the encounter doing chart review, review of test results, interpretation of tests, patient visit and documentation   see orders in Epic  Pt verbalized and agreed with the plan and is aware of the worsening symptoms for which  would need to follow up .  Pt was stable during time of discharge from the clinic     SUBJECTIVE     Su Tinoco is a 67 year old female presenting with a chief complaint of    Chief Complaint   Patient presents with     Urgent Care     Extremely sore throat since Friday night.  Also c/o of ear pain today.  She has had the covid vaccine and booster.  She has not had a fever.           Su Tinoco is a 67 year old female presenting with a chief complaint of ear pain bilateral and sore throat. She is an established patient of [facility].  Onset of symptoms was 3 day(s) ago.  Course of illness is same.    Severity moderate  Current and Associated symptoms: sore throat and some hoarseness of the voice  Treatment measures tried include Tylenol/Ibuprofen and Vaporizer.  Predisposing factors include None.    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      Colitis 11/2018     Mild intermittent asthma     sees allergist     Other specified disorders of thyroid      Spinal stenosis of lumbar region     L5, S1     Spondylolisthesis at L5-S1 level      Sprain of unspecified site of back      Trochanteric bursitis of right hip      Unspecified hypothyroidism      Current Outpatient Medications   Medication Sig Dispense Refill     albuterol (VENTOLIN HFA) 108 (90 Base) MCG/ACT inhaler INHALE 1 TO 2 PUFFS INTO THE LUNGS EVERY 6 HOURS AS NEEDED 36 g 0     Biotin w/ Vitamins C & E 1250-7.5-7.5 MCG-MG-UNT CHEW Take 2 chew tab by mouth daily       Calcium Citrate-Vitamin D (CITRACAL MAXIMUM PO) Take 1,200 Units by mouth 2 times daily       EPINEPHrine (EPIPEN) 0.3 MG/0.3ML injection Inject 0.3 mLs (0.3 mg) into the muscle once as needed for anaphylaxis 0.6 mL 11     levocetirizine (XYZAL) 5 MG tablet Take 5 mg by mouth every morning       levothyroxine (SYNTHROID/LEVOTHROID) 88 MCG tablet Take 1 tablet (88 mcg) by mouth daily 90 tablet 3      melatonin 5 MG tablet Take 10 mg by mouth At Bedtime (5MG X 2 = 10MG)       montelukast (SINGULAIR) 10 MG tablet Take 1 tablet (10 mg) by mouth At Bedtime 90 tablet 3     Multiple Vitamins-Minerals (WOMENS MULTI VITAMIN & MINERAL) TABS Take 1 tablet by mouth daily       Probiotic Product (DAILY PROBIOTIC) CAPS Take 1 capsule by mouth daily       RISEdronate (ACTONEL) 150 MG tablet Take 1 tablet (150 mg) by mouth every 30 days 3 tablet 3     traZODone (DESYREL) 100 MG tablet Take 1 tablet (100 mg) by mouth At Bedtime 90 tablet 3     Vitamin D, Cholecalciferol, 1000 units TABS Take 1,000 Units by mouth daily       buPROPion (WELLBUTRIN XL) 150 MG 24 hr tablet Take 1 tablet (150 mg) by mouth every morning (Patient not taking: Reported on 3/3/2022) 90 tablet 3     fluticasone (FLONASE) 50 MCG/ACT nasal spray Spray 1 spray into both nostrils daily (Patient not taking: Reported on 3/3/2022)       mometasone-formoterol (DULERA) 200-5 MCG/ACT inhaler Take 1 Act by mouth daily (Patient not taking: Reported on 3/3/2022)       Social History     Tobacco Use     Smoking status: Never Smoker     Smokeless tobacco: Never Used   Substance Use Topics     Alcohol use: Yes     Comment: rarely- once a month     Family History   Problem Relation Age of Onset     Eye Disorder Mother         iritis and glaucoma     Thyroid Disease Mother      Diabetes Father      Hypertension Father      Cancer Father         Melanoma     Cardiovascular Father         CHF     Diabetes Sister         type 2     Diabetes Sister 50        type 2 diabetes     Breast Cancer Sister         38     Heart Disease Maternal Grandmother         cardiomegaly         ROS:    10 point ROS of systems including Constitutional, Eyes, Cardiovascular, Gastroenterology, Genitourinary, Integumentary, Muscularskeletal, Psychiatric ,neurological were all negative except for pertinent positives noted in my HPI         OBJECTIVE:    /72 (BP Location: Right arm, Patient  Position: Sitting, Cuff Size: Adult Regular)   Pulse 78   Temp 99.2  F (37.3  C) (Tympanic)   Resp 12   Wt 72.1 kg (159 lb)   LMP 12/01/2007   SpO2 98%   BMI 29.08 kg/m    GENERAL APPEARANCE: healthy, alert and no distress  EYES: EOMI,  PERRL, conjunctiva clear  HENT: ear canals and TM's normal.  Nose and mouth without ulcers, throat showed erythema no  Lesions or exudate noted   NECK: supple, nontender, no lymphadenopathy  RESP: lungs clear to auscultation - no rales, rhonchi or wheezes  CV: regular rates and rhythm, normal S1 S2, no murmur noted  PSYCH: mentation appears normal  Physical Exam      (Note was completed, in part, with [employer] voice-recognition software. Documentation reviewed, but some grammatical, spelling, and word errors may remain.)  Leela Lucia MD.

## 2022-07-11 ENCOUNTER — TELEPHONE (OUTPATIENT)
Dept: INTERNAL MEDICINE | Facility: CLINIC | Age: 67
End: 2022-07-11

## 2022-07-11 NOTE — TELEPHONE ENCOUNTER
Call to pt. She states she has appt tomorrow with Virtual provider to see if she can get the Paxlovid. It was the first available time she could get.   She states she had the worst sore throat over the weekend she could hardly swallow her saliva. She went to  and was diagnosed with Covid.   She has hard time swallowing any fluids. Today is better than it has been.   She is taking Advil for pain and will try tylenol in between as well.

## 2022-07-12 ENCOUNTER — VIRTUAL VISIT (OUTPATIENT)
Dept: FAMILY MEDICINE | Facility: CLINIC | Age: 67
End: 2022-07-12
Payer: COMMERCIAL

## 2022-07-12 DIAGNOSIS — U07.1 INFECTION DUE TO 2019 NOVEL CORONAVIRUS: Primary | ICD-10-CM

## 2022-07-12 PROCEDURE — 99213 OFFICE O/P EST LOW 20 MIN: CPT | Mod: 95 | Performed by: NURSE PRACTITIONER

## 2022-07-12 ASSESSMENT — PATIENT HEALTH QUESTIONNAIRE - PHQ9
SUM OF ALL RESPONSES TO PHQ QUESTIONS 1-9: 0
SUM OF ALL RESPONSES TO PHQ QUESTIONS 1-9: 0
10. IF YOU CHECKED OFF ANY PROBLEMS, HOW DIFFICULT HAVE THESE PROBLEMS MADE IT FOR YOU TO DO YOUR WORK, TAKE CARE OF THINGS AT HOME, OR GET ALONG WITH OTHER PEOPLE: NOT DIFFICULT AT ALL

## 2022-07-12 NOTE — PATIENT INSTRUCTIONS
Infection due to 2019 novel coronavirus  Patient with positive COVID-19, symptom onset 7/9/2022 and seen in urgent care on 7/10/2022.  Patient interested in starting antiviral therapy. Discussed antiviral therapy, risks versus benefits, high risk indications as well as side effects.  We will start Paxlovid, medication interactions reviewed, potential interaction with trazodone dose in half.  Advised patient to cut trazodone dose in half or hold if able.  Encourage good supportive cares including hydration, rest, elevation of head of bed and Tylenol and or ibuprofen as needed.  Reviewed quarantine guidelines.  Advised patient to follow-up if symptoms not improving or worsening.  - nirmatrelvir and ritonavir (PAXLOVID) therapy pack; Take 3 tablets by mouth 2 times daily for 5 days Take 2 Nirmatrelvir tablets and 1 Ritonavir tablet twice daily for 5 days        Instructions for Patients      What are the symptoms of COVID-19?  Symptoms can include fever, cough, shortness of breath, chills, headache, muscle pain sore throat, fatigue, runny or stuffy nose, and loss of taste and smell. Other less common symptoms include nausea, vomiting, or diarrhea (watery stools).    Know when to call 911. Emergency warning signs include:  Trouble breathing or shortness of breath  Pain or pressure in the chest that doesn't go away  Feeling confused like you haven't felt before, or not being able to wake up  Bluish-colored lips or face    How can I take care of myself?  Get lots of rest. Drink extra fluids (unless a doctor has told you not to).  Take Tylenol (acetaminophen) for fever or pain. If you have liver or kidney problems, ask your family doctor if it's okay to take Tylenol   Adults:   650 mg (two 325 mg pills or tablets) every 4 to 6 hours, or...   1,000 mg (two 500 mg pills or tablets) every 8 hours as needed.  Note: Don't take more than 3,000 mg in one day. Acetaminophen is found in many medicines (both prescribed and over the  counter). Read all labels to be sure you don't take too much.  For children, check the Tylenol bottle for the right dose. The dose is based on the child's age or weight.  Take over the counter medicines for your symptoms as needed. Talk to your pharmacist.  If you have other health problems (like cancer, heart failure, an organ transplant, or severe kidney disease): Call your specialty clinic if you don't feel better in the next 2 days.    These guidelines are for isolating and quarantining before returning to work, school or .   For employers, schools and day cares: This is an official notice for this person s medical guidelines for returning in-person.   For health care sites: The CDC gives different isolation and quarantine guidelines for healthcare sites, please check with these sites before arriving.     How do I self-isolate?  You isolate when you have symptoms of COVID or a test shows you have COVID, even if you don t have symptoms.   If you DO have symptoms:  Stay home and away from others  For at least 5 days after your symptoms started, AND   You are fever free for 24 hours (with no medicine that reduces fever), AND  Your other symptoms are better.  Wear a mask for 10 full days any time you are around others.  If you DON T have symptoms:  Stay at home and away from others for at least 5 days after your positive test.  Wear a mask for 10 full days any time you are around others.    How and when do I quarantine?  You quarantine when you may have been exposed to the virus and DON T have symptoms.   Stay home and away from others.   You must quarantine for 5 days after your last contact with a person who has COVID.  Note: If you are fully vaccinated, you don t need to quarantine. You should still follow the steps below.   Wear a mask for 10 full days any time you re around others.  Get tested at least 5 days after you were exposed, even if you don t have symptoms.   If you start to have symptoms,  isolate right away and get tested.    Where can I get more information?  [facility] COVID-19 Resource Hub: [email or web address]   [facility] Quarantine & Isolation: https://www.cdc.gov/coronavirus/2019-ncov/your-health/quarantine-isolation.html   [facility] - What to Do If You're Sick: https://www.cdc.gov/coronavirus/2019-ncov/if-you-are-sick/index.html  [facility] clinical trials (COVID-19 research studies): clinicalaffairs.[facility]/umn-clinical-trials  [place] Department of Health COVID-19 Public Hotline: 1-410.554.3576

## 2022-07-12 NOTE — PROGRESS NOTES
"Su is a 67 year old who is being evaluated via a billable video visit.      How would you like to obtain your AVS? MyChart  If the video visit is dropped, the invitation should be resent by: Text to cell phone: . [phone]  Will anyone else be joining your video visit? No          Assessment & Plan     Infection due to 2019 novel coronavirus  Patient with positive COVID-19, symptom onset 7/9/2022 and seen in urgent care on 7/10/2022.  Patient interested in starting antiviral therapy. Discussed antiviral therapy, risks versus benefits, high risk indications as well as side effects.  We will start Paxlovid, medication interactions reviewed, potential interaction with trazodone dose in half.  Advised patient to cut trazodone dose in half or hold if able.  Encourage good supportive cares including hydration, rest, elevation of head of bed and Tylenol and or ibuprofen as needed.  Reviewed quarantine guidelines.  Advised patient to follow-up if symptoms not improving or worsening.  - nirmatrelvir and ritonavir (PAXLOVID) therapy pack; Take 3 tablets by mouth 2 times daily for 5 days Take 2 Nirmatrelvir tablets and 1 Ritonavir tablet twice daily for 5 days.             BMI:   Estimated body mass index is 29.08 kg/m  as calculated from the following:    Height as of 3/3/22: 1.575 m (5' 2\").    Weight as of 7/10/22: 72.1 kg (159 lb).   Weight management plan: Patient was referred to their PCP to discuss a diet and exercise plan.    See Patient Instructions    Return in about 1 week (around 7/19/2022), or if symptoms worsen or fail to improve.    Erendira Stokes, SEAN, APRN-CNP   [facility]    Subjective   Su is a 67 year old, presenting for the following health issues:  Covid Concern      HPI       COVID-19 Symptom Review  How many days ago did these symptoms start? Since 7/9/2022    Are any of the following symptoms significant for you?    New or worsening difficulty breathing? " No    Worsening cough? Yes, it's a dry cough.     Fever or chills? Yes, the highest temperature was 99.9    Headache: YES- but is getting better    Sore throat: YES- but is getting some better    Chest pain: No    Diarrhea: YES started today    Body aches? No    What treatments has patient tried? Acetaminophen   Does patient live in a nursing home, group home, or shelter? No  Does patient have a way to get food/medications during quarantined? Yes, I have a friend or family member who can help me.                Review of Systems   Constitutional, HEENT, cardiovascular, pulmonary, gi and gu systems are negative, except as otherwise noted.      Objective           Vitals:  No vitals were obtained today due to virtual visit.    Physical Exam   GENERAL: Healthy, alert and no distress  EYES: Eyes grossly normal to inspection.  No discharge or erythema, or obvious scleral/conjunctival abnormalities.  RESP: No audible wheeze, dry cough with hoarse voice, no visible cyanosis.  No visible retractions or increased work of breathing.    SKIN: Visible skin clear. No significant rash, abnormal pigmentation or lesions.  NEURO: Cranial nerves grossly intact.  Mentation and speech appropriate for age.  PSYCH: Mentation appears normal, affect normal/bright, judgement and insight intact, normal speech and appearance well-groomed.    Diagnostic Test Results:  none            Video-Visit Details    Video Start Time: 1:19 PM    Type of service:  Video Visit    Video End Time:1:35 PM    Originating Location (pt. Location): Home    Distant Location (provider location):  [facility]     Platform used for Video Visit: Novaled    .  ..     Chart documentation with Dragon Voice recognition Software. Although reviewed after completion, some words and grammatical errors may remain.

## 2022-07-13 ENCOUNTER — TELEPHONE (OUTPATIENT)
Dept: FAMILY MEDICINE | Facility: CLINIC | Age: 67
End: 2022-07-13

## 2022-07-13 NOTE — TELEPHONE ENCOUNTER
"S-(situation): started paxlovid yesterday, took dose this AM. Has had 19 episodes of diarrhea.     B-(background): Colitis, Asthma    A-(assessment): Breathing is ok. Diarrhea \"is dripping out of me\". As far as Covid, patient says she started \"turning the corner yesterday\" and has continued to improve. sore throat gone and she is \"perking up\".  Is taking double probiotics and is on a very restrictive diet because of her colitis. Is drinking Pedialyte.     R-(recommendations): Patient planning to stop paxlovid. She will push fluids. Advised to be seen if no improvement in diarrhea, no urine output in 8 hours, or breathing difficulty. Patient has good  Understanding with her history of colitis and asthma.   Dionna CLEMENT RN      "

## 2022-07-13 NOTE — TELEPHONE ENCOUNTER
Reason for Call:  Other     Detailed comments: Pt is calling about her recent medication of Paxlovid, and it is making her nausea, diarrhea metallic taste.  Did take today although wondering about taking the rest.      Phone Number Patient can be reached at: Home number on file [phone] (home)    Best Time: any    Can we leave a detailed message on this number? YES    Call taken on 7/13/2022 at 11:17 AM by Letitia Koch

## 2022-07-26 ENCOUNTER — TRANSFERRED RECORDS (OUTPATIENT)
Dept: HEALTH INFORMATION MANAGEMENT | Facility: CLINIC | Age: 67
End: 2022-07-26

## 2022-09-08 ENCOUNTER — TRANSFERRED RECORDS (OUTPATIENT)
Dept: HEALTH INFORMATION MANAGEMENT | Facility: CLINIC | Age: 67
End: 2022-09-08

## 2022-09-27 SDOH — HEALTH STABILITY: PHYSICAL HEALTH: ON AVERAGE, HOW MANY DAYS PER WEEK DO YOU ENGAGE IN MODERATE TO STRENUOUS EXERCISE (LIKE A BRISK WALK)?: 5 DAYS

## 2022-09-27 SDOH — ECONOMIC STABILITY: INCOME INSECURITY: HOW HARD IS IT FOR YOU TO PAY FOR THE VERY BASICS LIKE FOOD, HOUSING, MEDICAL CARE, AND HEATING?: NOT HARD AT ALL

## 2022-09-27 SDOH — ECONOMIC STABILITY: TRANSPORTATION INSECURITY
IN THE PAST 12 MONTHS, HAS LACK OF TRANSPORTATION KEPT YOU FROM MEETINGS, WORK, OR FROM GETTING THINGS NEEDED FOR DAILY LIVING?: NO

## 2022-09-27 SDOH — HEALTH STABILITY: PHYSICAL HEALTH: ON AVERAGE, HOW MANY MINUTES DO YOU ENGAGE IN EXERCISE AT THIS LEVEL?: 60 MIN

## 2022-09-27 SDOH — ECONOMIC STABILITY: FOOD INSECURITY: WITHIN THE PAST 12 MONTHS, THE FOOD YOU BOUGHT JUST DIDN'T LAST AND YOU DIDN'T HAVE MONEY TO GET MORE.: NEVER TRUE

## 2022-09-27 SDOH — ECONOMIC STABILITY: FOOD INSECURITY: WITHIN THE PAST 12 MONTHS, YOU WORRIED THAT YOUR FOOD WOULD RUN OUT BEFORE YOU GOT MONEY TO BUY MORE.: NEVER TRUE

## 2022-09-27 SDOH — ECONOMIC STABILITY: INCOME INSECURITY: IN THE LAST 12 MONTHS, WAS THERE A TIME WHEN YOU WERE NOT ABLE TO PAY THE MORTGAGE OR RENT ON TIME?: NO

## 2022-09-27 SDOH — ECONOMIC STABILITY: TRANSPORTATION INSECURITY
IN THE PAST 12 MONTHS, HAS THE LACK OF TRANSPORTATION KEPT YOU FROM MEDICAL APPOINTMENTS OR FROM GETTING MEDICATIONS?: NO

## 2022-09-27 ASSESSMENT — ASTHMA QUESTIONNAIRES
QUESTION_4 LAST FOUR WEEKS HOW OFTEN HAVE YOU USED YOUR RESCUE INHALER OR NEBULIZER MEDICATION (SUCH AS ALBUTEROL): NOT AT ALL
QUESTION_3 LAST FOUR WEEKS HOW OFTEN DID YOUR ASTHMA SYMPTOMS (WHEEZING, COUGHING, SHORTNESS OF BREATH, CHEST TIGHTNESS OR PAIN) WAKE YOU UP AT NIGHT OR EARLIER THAN USUAL IN THE MORNING: NOT AT ALL
QUESTION_5 LAST FOUR WEEKS HOW WOULD YOU RATE YOUR ASTHMA CONTROL: COMPLETELY CONTROLLED
QUESTION_1 LAST FOUR WEEKS HOW MUCH OF THE TIME DID YOUR ASTHMA KEEP YOU FROM GETTING AS MUCH DONE AT WORK, SCHOOL OR AT HOME: NONE OF THE TIME
QUESTION_2 LAST FOUR WEEKS HOW OFTEN HAVE YOU HAD SHORTNESS OF BREATH: NOT AT ALL
ACT_TOTALSCORE: 25
ACT_TOTALSCORE: 25

## 2022-09-27 ASSESSMENT — SOCIAL DETERMINANTS OF HEALTH (SDOH)
HOW OFTEN DO YOU GET TOGETHER WITH FRIENDS OR RELATIVES?: MORE THAN THREE TIMES A WEEK
IN A TYPICAL WEEK, HOW MANY TIMES DO YOU TALK ON THE PHONE WITH FAMILY, FRIENDS, OR NEIGHBORS?: MORE THAN THREE TIMES A WEEK
HOW OFTEN DO YOU ATTEND CHURCH OR RELIGIOUS SERVICES?: MORE THAN 4 TIMES PER YEAR
DO YOU BELONG TO ANY CLUBS OR ORGANIZATIONS SUCH AS CHURCH GROUPS UNIONS, FRATERNAL OR ATHLETIC GROUPS, OR SCHOOL GROUPS?: YES

## 2022-09-27 ASSESSMENT — LIFESTYLE VARIABLES
HOW MANY STANDARD DRINKS CONTAINING ALCOHOL DO YOU HAVE ON A TYPICAL DAY: 1 OR 2
HOW OFTEN DO YOU HAVE SIX OR MORE DRINKS ON ONE OCCASION: NEVER
SKIP TO QUESTIONS 9-10: 1
AUDIT-C TOTAL SCORE: 2
HOW OFTEN DO YOU HAVE A DRINK CONTAINING ALCOHOL: 2-4 TIMES A MONTH

## 2022-10-04 ENCOUNTER — MYC MEDICAL ADVICE (OUTPATIENT)
Dept: INTERNAL MEDICINE | Facility: CLINIC | Age: 67
End: 2022-10-04

## 2022-10-04 ENCOUNTER — OFFICE VISIT (OUTPATIENT)
Dept: FAMILY MEDICINE | Facility: CLINIC | Age: 67
End: 2022-10-04
Payer: COMMERCIAL

## 2022-10-04 VITALS
SYSTOLIC BLOOD PRESSURE: 126 MMHG | OXYGEN SATURATION: 98 % | HEART RATE: 64 BPM | WEIGHT: 167.4 LBS | RESPIRATION RATE: 16 BRPM | DIASTOLIC BLOOD PRESSURE: 78 MMHG | TEMPERATURE: 98.3 F | HEIGHT: 62 IN | BODY MASS INDEX: 30.8 KG/M2

## 2022-10-04 DIAGNOSIS — M15.1 DEGENERATIVE ARTHRITIS OF DISTAL INTERPHALANGEAL JOINT OF INDEX FINGER OF LEFT HAND: ICD-10-CM

## 2022-10-04 DIAGNOSIS — E03.9 ACQUIRED HYPOTHYROIDISM: ICD-10-CM

## 2022-10-04 DIAGNOSIS — Z01.818 PRE-OPERATIVE GENERAL PHYSICAL EXAMINATION: ICD-10-CM

## 2022-10-04 LAB
ANION GAP SERPL CALCULATED.3IONS-SCNC: 6 MMOL/L (ref 3–14)
BUN SERPL-MCNC: 15 MG/DL (ref 7–30)
CALCIUM SERPL-MCNC: 9.4 MG/DL (ref 8.5–10.1)
CHLORIDE BLD-SCNC: 105 MMOL/L (ref 94–109)
CO2 SERPL-SCNC: 28 MMOL/L (ref 20–32)
CREAT SERPL-MCNC: 0.78 MG/DL (ref 0.52–1.04)
GFR SERPL CREATININE-BSD FRML MDRD: 84 ML/MIN/1.73M2
GLUCOSE BLD-MCNC: 97 MG/DL (ref 70–99)
POTASSIUM BLD-SCNC: 3.7 MMOL/L (ref 3.4–5.3)
SODIUM SERPL-SCNC: 139 MMOL/L (ref 133–144)
TSH SERPL DL<=0.005 MIU/L-ACNC: 1.08 MU/L (ref 0.4–4)

## 2022-10-04 PROCEDURE — 80048 BASIC METABOLIC PNL TOTAL CA: CPT | Performed by: FAMILY MEDICINE

## 2022-10-04 PROCEDURE — 99214 OFFICE O/P EST MOD 30 MIN: CPT | Performed by: FAMILY MEDICINE

## 2022-10-04 PROCEDURE — 84443 ASSAY THYROID STIM HORMONE: CPT | Performed by: FAMILY MEDICINE

## 2022-10-04 PROCEDURE — 36415 COLL VENOUS BLD VENIPUNCTURE: CPT | Performed by: FAMILY MEDICINE

## 2022-10-04 PROCEDURE — 93000 ELECTROCARDIOGRAM COMPLETE: CPT | Performed by: FAMILY MEDICINE

## 2022-10-04 NOTE — PATIENT INSTRUCTIONS
I will review your studies via [employer] when they are available. If you have any questions or concerns please let me know via [employer], or call the clinic.

## 2022-10-04 NOTE — PROGRESS NOTES
[facility]  [address] [facility] [address]  Phone: [phone]  Primary Provider: Jessica Smith  Pre-op Performing Provider: MONICA MCLEAN    PREOPERATIVE EVALUATION:  Today's date: 10/4/2022    Su Tinoco is a 67 year old female who presents for a preoperative evaluation.    Surgical Information:  Surgery/Procedure:   Left index finger DIP arthrodesis  Surgery Location: [facility]  Surgeon: Denys Henley   Surgery Date: 10/7/2022  Time of Surgery: morning  Where patient plans to recover: At home with family  Fax number for surgical facility: [phone]    Type of Anesthesia Anticipated: to be determined    Assessment & Plan     The proposed surgical procedure is considered LOW risk.  See after visit summary and result note from studies for helpful information and advice given to patient.    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                      Subjective     HPI related to upcoming procedure: Patient has history of DJD of left index finger which will be treated by upcoming surgery.     Preop Questions 9/27/2022   1. Have you ever had a heart attack or stroke? No   2. Have you ever had surgery on your heart or blood vessels, such as a stent placement, a coronary artery bypass, or surgery on an artery in your head, neck, heart, or legs? No   3. Do you have chest pain with activity? No   4. Do you have a history of  heart failure? No   5. Do you currently have a cold, bronchitis or symptoms of other infection? No   6. Do you have a cough, shortness of breath, or wheezing? No   7. Do you or anyone in your  family have previous history of blood clots? YES - Mom, no personal problems.    8. Do you or does anyone in your family have a serious bleeding problem such as prolonged bleeding following surgeries or cuts? No   9. Have you ever had problems with anemia or been told to take iron pills? No   10. Have you had any abnormal blood loss such as black, tarry or bloody stools, or abnormal vaginal bleeding? No   11. Have you ever had a blood transfusion? No   12. Are you willing to have a blood transfusion if it is medically needed before, during, or after your surgery? Yes   13. Have you or any of your relatives ever had problems with anesthesia? No   14. Do you have sleep apnea, excessive snoring or daytime drowsiness? No   15. Do you have any artifical heart valves or other implanted medical devices like a pacemaker, defibrillator, or continuous glucose monitor? No   16. Do you have artificial joints? No   17. Are you allergic to latex? No   18. Is there any chance that you may be pregnant? -       Health Care Directive:  Patient has a Health Care Directive on file      Preoperative Review of :   reviewed - no record of controlled substances prescribed.          Review of Systems  Constitutional, neuro, ENT, endocrine, pulmonary, cardiac, gastrointestinal, genitourinary, musculoskeletal, integument and psychiatric systems are negative, except as otherwise noted.    Other then left index finger discomfort, patient feels well.     Patient plans to have exam by PCP in December. We will do a TSH today since she will be due in November for this test.     Patient Active Problem List    Diagnosis Date Noted     CARDIOVASCULAR SCREENING; LDL GOAL LESS THAN 130 11/16/2021     Priority: Medium     Anxiety 03/02/2020     Priority: Medium     Osteopenia, unspecified location 02/07/2020     Priority: Medium     Status post lumbar spinal fusion 09/26/2019     Priority: Medium     Collagenous colitis 12/05/2018     Priority:  Medium     Spinal stenosis of lumbar region      Priority: Medium     L5, S1       Spondylolisthesis at L5-S1 level      Priority: Medium     Mild intermittent asthma      Priority: Medium     Allergic rhinitis 2003     Priority: Medium     Problem list name updated by automated process. Provider to review       Hypothyroidism 2003     Priority: Medium     Problem list name updated by automated process. 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      Colitis 2018     Mild intermittent asthma     sees allergist     Other specified disorders of thyroid      Spinal stenosis of lumbar region     L5, S1     Spondylolisthesis at L5-S1 level      Sprain of unspecified site of back      Trochanteric bursitis of right hip      Unspecified hypothyroidism      Past Surgical History:   Procedure Laterality Date     BREAST SURGERY      breast reduction     C/SECTION, LOW TRANSVERSE      , Low Transverse     EYE SURGERY      blepharoplasty     LAMINECTOMY, FUSION LUMBAR ONE LEVEL, COMBINED N/A 2019    Procedure: L4-S1 LAMINECTOMY, L5-S1 POSTERIOR SPINAL FUSION;  Surgeon: Wily Howell MD;  Location:  OR     TONSILLECTOMY & ADENOIDECTOMY       TUBAL LIGATION       [facility] NONSPECIFIC PROCEDURE      Laparoscopies IUD infection     Z NONSPECIFIC PROCEDURE      removal of myxoid tumors fingers     Z NONSPECIFIC PROCEDURE      sphincterotomy of anus     Current Outpatient Medications   Medication Sig Dispense Refill     albuterol (VENTOLIN HFA) 108 (90 Base) MCG/ACT inhaler INHALE 1 TO 2 PUFFS INTO THE LUNGS EVERY 6 HOURS AS NEEDED 36 g 0     Biotin w/ Vitamins C & E 1250-7.5-7.5 MCG-MG-UNT CHEW Take 2 chew tab by mouth daily       Calcium Citrate-Vitamin D (CITRACAL MAXIMUM PO) Take 1,200 Units by mouth 2 times daily       EPINEPHrine (EPIPEN) 0.3 MG/0.3ML injection Inject 0.3  "mLs (0.3 mg) into the muscle once as needed for anaphylaxis 0.6 mL 11     fluticasone (FLONASE) 50 MCG/ACT nasal spray Spray 1 spray into both nostrils daily       levocetirizine (XYZAL) 5 MG tablet Take 5 mg by mouth every morning       levothyroxine (SYNTHROID/LEVOTHROID) 88 MCG tablet Take 1 tablet (88 mcg) by mouth daily 90 tablet 3     melatonin 5 MG tablet Take 10 mg by mouth At Bedtime (5MG X 2 = 10MG)       montelukast (SINGULAIR) 10 MG tablet Take 1 tablet (10 mg) by mouth At Bedtime 90 tablet 3     Multiple Vitamins-Minerals (WOMENS MULTI VITAMIN & MINERAL) TABS Take 1 tablet by mouth daily       Probiotic Product (DAILY PROBIOTIC) CAPS Take 1 capsule by mouth daily       RISEdronate (ACTONEL) 150 MG tablet Take 1 tablet (150 mg) by mouth every 30 days 3 tablet 3     traZODone (DESYREL) 100 MG tablet Take 1 tablet (100 mg) by mouth At Bedtime 90 tablet 3     Vitamin D, Cholecalciferol, 1000 units TABS Take 1,000 Units by mouth daily         Allergies   Allergen Reactions     Nuts Anaphylaxis     peanuts, nara nuts     Compazine      Lock jaw, vision problems and shakes     Erythromycin GI Disturbance     Prochlorperazine      Jaw spasm and vision loss        Social History     Tobacco Use     Smoking status: Never Smoker     Smokeless tobacco: Never Used   Substance Use Topics     Alcohol use: Yes     Comment: rarely- once a month       History   Drug Use No         Objective     /78   Pulse 64   Temp 98.3  F (36.8  C) (Tympanic)   Resp 16   Ht 1.562 m (5' 1.5\")   Wt 75.9 kg (167 lb 6.4 oz)   [facility] 12/01/2007   SpO2 98%   BMI 31.12 kg/m      Physical Exam  General: Vital signs reviewed.  Patient is in no acute appearing distress.  Breathing appears nonlabored.  Patient is alert and oriented ×3.      ENT: Ear exam shows bilateral tympanic membranes to be clear without injection, nasal turbinates show no injection or edema, no pharyngeal injection or exudate.    Neck: supple with no adenoapthy, " palpable abnormal masses, or thyroid abnormality.    Eyes: No scleral, lid, or periorbital injection or edema noted.  No eye mattering noted.  Corneas are clear. Pupils are equal round and reactive to light with normal consensual eye movement.    Heart: Heart rate is regular without murmur.    Lungs: Lungs are clear to auscultation with good airflow bilaterally.    Abdomen:  Abdomen is soft, nontender.  No palpable abnormal masses or organomegaly.  Bowel sounds are normal.    Back: No areas of tenderness.    Skin: Warm and dry, with no rash or abnormal lesions noted.    Extremities: No lower leg edema noted. Hypertrophic distal pip joint left index finger with decreased range of motion in flexion in this joint.    Neuro: No acute focal deficits or other abnormalities noted.    Psych: Patient is very pleasant, making good eye contact, with clear and fluent speech.  Answers questions appropriately. No psychomotor agitation.     Recent Labs   Lab Test 11/16/21  0840      POTASSIUM 4.1   CR 0.76        Diagnostics:  Recent Results (from the past 48 hour(s))   Basic metabolic panel    Collection Time: 10/04/22  1:48 PM   Result Value Ref Range    Sodium 139 133 - 144 mmol/L    Potassium 3.7 3.4 - 5.3 mmol/L    Chloride 105 94 - 109 mmol/L    Carbon Dioxide (CO2) 28 20 - 32 mmol/L    Anion Gap 6 3 - 14 mmol/L    Urea Nitrogen 15 7 - 30 mg/dL    Creatinine 0.78 0.52 - 1.04 mg/dL    Calcium 9.4 8.5 - 10.1 mg/dL    Glucose 97 70 - 99 mg/dL    GFR Estimate 84 >60 mL/min/1.73m2   TSH with free T4 reflex    Collection Time: 10/04/22  1:48 PM   Result Value Ref Range    TSH 1.08 0.40 - 4.00 mU/L      EKG findings: Sinus bradycardia with a slightly slower than normal heart rate of 59 bpm.  No ischemic changes or rhythm abnormalities noted.    Revised Cardiac Risk Index (RCRI):  The patient has the following serious cardiovascular risks for perioperative complications:   - No serious cardiac risks = 0 points     RCRI  Interpretation: 0 points: Class I (very low risk - 0.4% complication rate)           Signed Electronically by: Valerio Hunter DO  Copy of this evaluation report is provided to requesting physician.

## 2022-10-14 ENCOUNTER — TRANSFERRED RECORDS (OUTPATIENT)
Dept: HEALTH INFORMATION MANAGEMENT | Facility: CLINIC | Age: 67
End: 2022-10-14

## 2022-10-22 ENCOUNTER — HEALTH MAINTENANCE LETTER (OUTPATIENT)
Age: 67
End: 2022-10-22

## 2022-11-22 ENCOUNTER — TRANSFERRED RECORDS (OUTPATIENT)
Dept: INTERNAL MEDICINE | Facility: CLINIC | Age: 67
End: 2022-11-22

## 2022-11-25 ASSESSMENT — ENCOUNTER SYMPTOMS
DYSURIA: 0
CONSTIPATION: 0
ABDOMINAL PAIN: 0
WEAKNESS: 0
NAUSEA: 0
HEADACHES: 0
FEVER: 0
MYALGIAS: 0
BREAST MASS: 0
DIZZINESS: 0
HEMATOCHEZIA: 0
CHILLS: 0
ARTHRALGIAS: 0
PALPITATIONS: 0
SORE THROAT: 0
DIARRHEA: 0
SHORTNESS OF BREATH: 0
HEMATURIA: 0
COUGH: 0
HEARTBURN: 1
JOINT SWELLING: 0
PARESTHESIAS: 0
EYE PAIN: 0
NERVOUS/ANXIOUS: 1
FREQUENCY: 0

## 2022-11-25 ASSESSMENT — ACTIVITIES OF DAILY LIVING (ADL): CURRENT_FUNCTION: NO ASSISTANCE NEEDED

## 2022-12-02 ENCOUNTER — OFFICE VISIT (OUTPATIENT)
Dept: INTERNAL MEDICINE | Facility: CLINIC | Age: 67
End: 2022-12-02
Payer: COMMERCIAL

## 2022-12-02 VITALS
RESPIRATION RATE: 16 BRPM | DIASTOLIC BLOOD PRESSURE: 76 MMHG | HEART RATE: 80 BPM | HEIGHT: 62 IN | OXYGEN SATURATION: 96 % | WEIGHT: 165.9 LBS | BODY MASS INDEX: 30.53 KG/M2 | TEMPERATURE: 97.9 F | SYSTOLIC BLOOD PRESSURE: 118 MMHG

## 2022-12-02 DIAGNOSIS — Z00.00 ENCOUNTER FOR ROUTINE ADULT HEALTH EXAMINATION WITHOUT ABNORMAL FINDINGS: Primary | ICD-10-CM

## 2022-12-02 DIAGNOSIS — J30.1 ALLERGIC RHINITIS DUE TO POLLEN, UNSPECIFIED SEASONALITY: ICD-10-CM

## 2022-12-02 DIAGNOSIS — R61 NIGHT SWEATS: ICD-10-CM

## 2022-12-02 DIAGNOSIS — M85.80 OSTEOPENIA, UNSPECIFIED LOCATION: ICD-10-CM

## 2022-12-02 DIAGNOSIS — E03.9 ACQUIRED HYPOTHYROIDISM: ICD-10-CM

## 2022-12-02 DIAGNOSIS — J45.20 MILD INTERMITTENT ASTHMA, UNCOMPLICATED: ICD-10-CM

## 2022-12-02 DIAGNOSIS — G47.00 INSOMNIA, UNSPECIFIED TYPE: ICD-10-CM

## 2022-12-02 LAB
BASOPHILS # BLD AUTO: 0.1 10E3/UL (ref 0–0.2)
BASOPHILS NFR BLD AUTO: 1 %
EOSINOPHIL # BLD AUTO: 0.3 10E3/UL (ref 0–0.7)
EOSINOPHIL NFR BLD AUTO: 5 %
ERYTHROCYTE [DISTWIDTH] IN BLOOD BY AUTOMATED COUNT: 12.9 % (ref 10–15)
HCT VFR BLD AUTO: 42.1 % (ref 35–47)
HGB BLD-MCNC: 14 G/DL (ref 11.7–15.7)
IMM GRANULOCYTES # BLD: 0 10E3/UL
IMM GRANULOCYTES NFR BLD: 0 %
LYMPHOCYTES # BLD AUTO: 2.2 10E3/UL (ref 0.8–5.3)
LYMPHOCYTES NFR BLD AUTO: 38 %
MCH RBC QN AUTO: 30.6 PG (ref 26.5–33)
MCHC RBC AUTO-ENTMCNC: 33.3 G/DL (ref 31.5–36.5)
MCV RBC AUTO: 92 FL (ref 78–100)
MONOCYTES # BLD AUTO: 0.6 10E3/UL (ref 0–1.3)
MONOCYTES NFR BLD AUTO: 10 %
NEUTROPHILS # BLD AUTO: 2.6 10E3/UL (ref 1.6–8.3)
NEUTROPHILS NFR BLD AUTO: 46 %
PLATELET # BLD AUTO: 280 10E3/UL (ref 150–450)
RBC # BLD AUTO: 4.57 10E6/UL (ref 3.8–5.2)
WBC # BLD AUTO: 5.7 10E3/UL (ref 4–11)

## 2022-12-02 PROCEDURE — 85025 COMPLETE CBC W/AUTO DIFF WBC: CPT | Performed by: INTERNAL MEDICINE

## 2022-12-02 PROCEDURE — 36415 COLL VENOUS BLD VENIPUNCTURE: CPT | Performed by: INTERNAL MEDICINE

## 2022-12-02 PROCEDURE — 99214 OFFICE O/P EST MOD 30 MIN: CPT | Mod: 25 | Performed by: INTERNAL MEDICINE

## 2022-12-02 PROCEDURE — 99397 PER PM REEVAL EST PAT 65+ YR: CPT | Performed by: INTERNAL MEDICINE

## 2022-12-02 RX ORDER — TRAZODONE HYDROCHLORIDE 100 MG/1
100 TABLET ORAL AT BEDTIME
Qty: 90 TABLET | Refills: 3 | Status: SHIPPED | OUTPATIENT
Start: 2022-12-02 | End: 2023-10-31

## 2022-12-02 RX ORDER — RISEDRONATE SODIUM 150 MG/1
150 TABLET, FILM COATED ORAL
Qty: 3 TABLET | Refills: 3 | Status: SHIPPED | OUTPATIENT
Start: 2022-12-02 | End: 2023-10-31

## 2022-12-02 RX ORDER — LEVOTHYROXINE SODIUM 88 UG/1
88 TABLET ORAL DAILY
Qty: 90 TABLET | Refills: 3 | Status: SHIPPED | OUTPATIENT
Start: 2022-12-02 | End: 2023-10-31

## 2022-12-02 RX ORDER — MONTELUKAST SODIUM 10 MG/1
10 TABLET ORAL AT BEDTIME
Qty: 90 TABLET | Refills: 3 | Status: SHIPPED | OUTPATIENT
Start: 2022-12-02 | End: 2023-10-31

## 2022-12-02 RX ORDER — ALBUTEROL SULFATE 90 UG/1
AEROSOL, METERED RESPIRATORY (INHALATION)
Qty: 18 G | Refills: 3 | Status: SHIPPED | OUTPATIENT
Start: 2022-12-02 | End: 2023-04-28

## 2022-12-02 ASSESSMENT — ENCOUNTER SYMPTOMS
HEARTBURN: 1
SHORTNESS OF BREATH: 0
HEADACHES: 0
SORE THROAT: 0
DIARRHEA: 0
CONSTIPATION: 0
CHILLS: 0
JOINT SWELLING: 0
COUGH: 0
FEVER: 0
DIZZINESS: 0
BREAST MASS: 0
ABDOMINAL PAIN: 0
PARESTHESIAS: 0
NAUSEA: 0
HEMATURIA: 0
WEAKNESS: 0
FREQUENCY: 0
DYSURIA: 0
EYE PAIN: 0
PALPITATIONS: 0
HEMATOCHEZIA: 0
MYALGIAS: 0
ARTHRALGIAS: 0
NERVOUS/ANXIOUS: 1

## 2022-12-02 ASSESSMENT — ACTIVITIES OF DAILY LIVING (ADL): CURRENT_FUNCTION: NO ASSISTANCE NEEDED

## 2022-12-02 ASSESSMENT — PAIN SCALES - GENERAL: PAINLEVEL: NO PAIN (0)

## 2022-12-02 NOTE — PROGRESS NOTES
"SUBJECTIVE:   Su is a 67 year old who presents for Preventive Visit.  Patient has been advised of split billing requirements and indicates understanding: Yes  Are you in the first 12 months of your Medicare coverage?  No    Healthy Habits:     In general, how would you rate your overall health?  Excellent    Duration of exercise:  45-60 minutes    Do you usually eat at least 4 servings of fruit and vegetables a day, include whole grains    & fiber and avoid regularly eating high fat or \"junk\" foods?  Yes    Taking medications regularly:  Yes    Medication side effects:  None    Ability to successfully perform activities of daily living:  No assistance needed    Home Safety:  No safety concerns identified    Hearing Impairment:  No hearing concerns    In the past 6 months, have you been bothered by leaking of urine?  No    In general, how would you rate your overall mental or emotional health?  Good      PHQ-2 Total Score: 0    Additional concerns today:  Yes    Problems:  1.  Mild intermittent asthma: Overall doing well  2.  Hypothyroidism: Clinically stable  3.  Anxiety: Minimal symptoms though work has been stressful this year.  She had tried Wellbutrin for mood including some mild depression in the past but it elevated her blood pressure so she went off of it.  She does not feel she needs to be on medication at this time.  4.  Osteopenia: She is tolerating Actonel    Current concerns:  She reports she has been sweating a lot at night.  She is wondering if it is just more stress related as it seems to be in the same timeframe she has had a stress.  It is not drenching, no fever or chills, no change in appetite, no weight loss.    Patient Active Problem List   Diagnosis     Allergic rhinitis     Hypothyroidism     Mild intermittent asthma     Spinal stenosis of lumbar region     Spondylolisthesis at L5-S1 level     Collagenous colitis     Status post lumbar spinal fusion     Osteopenia, unspecified location     " Anxiety     CARDIOVASCULAR SCREENING; LDL GOAL LESS THAN 130     Current Outpatient Medications   Medication Sig Dispense Refill     albuterol (VENTOLIN HFA) 108 (90 Base) MCG/ACT inhaler INHALE 1 TO 2 PUFFS INTO THE LUNGS EVERY 6 HOURS AS NEEDED 36 g 0     Biotin w/ Vitamins C & E 1250-7.5-7.5 MCG-MG-UNT CHEW Take 2 chew tab by mouth daily       Calcium Citrate-Vitamin D (CITRACAL MAXIMUM PO) Take 1,200 Units by mouth 2 times daily       EPINEPHrine (EPIPEN) 0.3 MG/0.3ML injection Inject 0.3 mLs (0.3 mg) into the muscle once as needed for anaphylaxis 0.6 mL 11     fluticasone (FLONASE) 50 MCG/ACT nasal spray Spray 1 spray into both nostrils daily       levocetirizine (XYZAL) 5 MG tablet Take 5 mg by mouth every morning       levothyroxine (SYNTHROID/LEVOTHROID) 88 MCG tablet Take 1 tablet (88 mcg) by mouth daily 90 tablet 3     melatonin 5 MG tablet Take 10 mg by mouth At Bedtime (5MG X 2 = 10MG)       montelukast (SINGULAIR) 10 MG tablet Take 1 tablet (10 mg) by mouth At Bedtime 90 tablet 3     Multiple Vitamins-Minerals (WOMENS MULTI VITAMIN & MINERAL) TABS Take 1 tablet by mouth daily       Probiotic Product (DAILY PROBIOTIC) CAPS Take 1 capsule by mouth daily       RISEdronate (ACTONEL) 150 MG tablet Take 1 tablet (150 mg) by mouth every 30 days 3 tablet 3     traZODone (DESYREL) 100 MG tablet Take 1 tablet (100 mg) by mouth At Bedtime 90 tablet 3     Vitamin D, Cholecalciferol, 1000 units TABS Take 1,000 Units by mouth daily          Have you ever done Advance Care Planning? (For example, a Health Directive, POLST, or a discussion with a medical provider or your loved ones about your wishes):        Fall risk  Fallen 2 or more times in the past year?: No  Any fall with injury in the past year?: No    Cognitive Screening   1) Repeat 3 items (Leader, Season, Table)      2) Clock draw:   NORMAL  3) 3 item recall: Recalls 3 objects  Results: 3 items recalled: COGNITIVE IMPAIRMENT LESS LIKELY    Mini-CogTM  Copyright NIMISHA Suazo. Licensed by the author for use in [facility]; reprinted with permission (amanda@[email or web address]). All rights reserved.      Do you have sleep apnea, excessive snoring or daytime drowsiness?: no    Reviewed and updated as needed this visit by clinical staff   Tobacco  Allergies  Meds              Reviewed and updated as needed this visit by Provider                 Social History     Tobacco Use     Smoking status: Never     Smokeless tobacco: Never   Substance Use Topics     Alcohol use: Yes     Comment: rarely- once a month     If you drink alcohol do you typically have >3 drinks per day or >7 drinks per week? No    Alcohol Use 11/25/2022   Prescreen: >3 drinks/day or >7 drinks/week? No   Prescreen: >3 drinks/day or >7 drinks/week? -               Current providers sharing in care for this patient include:   Patient Care Team:  Jessica Smith MD as PCP - General  Jessica Smith MD as Assigned PCP    The following health maintenance items are reviewed in Epic and correct as of today:  Health Maintenance   Topic Date Due     DEPRESSION ACTION PLAN  Never done     HIV SCREENING  Never done     ZOSTER IMMUNIZATION (1 of 2) Never done     ASTHMA ACTION PLAN  11/12/2021     COVID-19 Vaccine (4 - Booster for Pfizer series) 02/21/2022     Pneumococcal Vaccine: 65+ Years (1 - PCV) Never done     ANNUAL REVIEW OF HM ORDERS  11/16/2022     MEDICARE ANNUAL WELLNESS VISIT  11/16/2022     PHQ-9  01/12/2023     ASTHMA CONTROL TEST  04/04/2023     BRONWYN ASSESSMENT  07/12/2023     MAMMO SCREENING  07/26/2023     TSH W/FREE T4 REFLEX  10/04/2023     FALL RISK ASSESSMENT  12/02/2023     ADVANCE CARE PLANNING  09/27/2024     COLORECTAL CANCER SCREENING  04/30/2026     LIPID  11/16/2026     DTAP/TDAP/TD IMMUNIZATION (3 - Td or Tdap) 08/01/2027     DEXA  12/07/2036     HEPATITIS C SCREENING  Completed     MIGRAINE ACTION PLAN  Completed     INFLUENZA VACCINE  Completed     IPV IMMUNIZATION  Aged Out     MENINGITIS  "IMMUNIZATION  Aged Out     PAP  Discontinued           FHS-7:   Breast CA Risk Assessment (FHS-7) 11/16/2021 9/27/2022 11/25/2022   Did any of your first-degree relatives have breast or ovarian cancer? Yes Yes Yes   Did any of your relatives have bilateral breast cancer? Yes Yes Yes   Did any man in your family have breast cancer? No No No   Did any woman in your family have breast and ovarian cancer? Yes Yes Yes   Did any woman in your family have breast cancer before age 50 y? Yes Yes Yes   Do you have 2 or more relatives with breast and/or ovarian cancer? No No Unknown   Do you have 2 or more relatives with breast and/or bowel cancer? No No Unknown       Mammogram Screening: Recommended annual mammography  Pertinent mammograms are reviewed under the imaging tab.    Review of Systems   Constitutional: Negative for chills and fever.   HENT: Negative for congestion, ear pain, hearing loss and sore throat.    Eyes: Negative for pain and visual disturbance.   Respiratory: Negative for cough and shortness of breath.    Cardiovascular: Negative for chest pain, palpitations and peripheral edema.   Gastrointestinal: Positive for heartburn. Negative for abdominal pain, constipation, diarrhea, hematochezia and nausea.   Breasts:  Negative for tenderness, breast mass and discharge.   Genitourinary: Negative for dysuria, frequency, genital sores, hematuria, pelvic pain, urgency, vaginal bleeding and vaginal discharge.   Musculoskeletal: Negative for arthralgias, joint swelling and myalgias.   Skin: Negative for rash.   Neurological: Negative for dizziness, weakness, headaches and paresthesias.   Psychiatric/Behavioral: Negative for mood changes. The patient is nervous/anxious.          OBJECTIVE:   /76   Pulse 80   Temp 97.9  F (36.6  C) (Tympanic)   Resp 16   Ht 1.562 m (5' 1.5\")   Wt 75.3 kg (165 lb 14.4 oz)   LMP 12/01/2007   SpO2 96%   BMI 30.84 kg/m   Estimated body mass index is 30.84 kg/m  as calculated " "from the following:    Height as of this encounter: 1.562 m (5' 1.5\").    Weight as of this encounter: 75.3 kg (165 lb 14.4 oz).  Physical Exam    HEENT: extraocular movements are intact, pupils equal and reactive to light and accommodation, TMs clear  NECK: Neck supple. No adenopathy. Thyroid symmetric, normal size,, Carotids without bruits.  PULMONARY: clear to auscultation  CARDIAC: regular rate and rhythm and no murmurs, clicks, or gallops  PULSES: 2/2 throughout  BACK: no spinal or CVAT  ABDOMINAL: Soft, nontender.  Normal bowel sounds.  No hepatosplenomegaly or abnormal masses  REFLEXES: 2+ throughout          ASSESSMENT / PLAN:   (Z00.00) Encounter for routine adult health examination without abnormal findings  (primary encounter diagnosis)  Comment: Advised about shingles vaccine, COVID booster and pneumonia shot.  She is going to delay the shots for now.  Plan:     (R61) Night sweats  Comment: May be stress related, no other significant symptoms, no adenopathy but will check  blood counts  Plan: CBC with platelets and differential            (J45.20) Mild intermittent asthma, uncomplicated  Comment: Overall stable  Plan: albuterol (VENTOLIN HFA) 108 (90 Base) MCG/ACT         inhaler        Continue inhaler.    (E03.9) Acquired hypothyroidism  Comment: Clinically stable, lab was fine couple months ago.  Plan: levothyroxine (SYNTHROID/LEVOTHROID) 88 MCG         tablet            (M85.80) Osteopenia, unspecified location  Comment: Stable on medication  Plan: RISEdronate (ACTONEL) 150 MG tablet        Recheck DEXA next year    (J30.1) Allergic rhinitis due to pollen, unspecified seasonality  Comment: Stable  Plan: montelukast (SINGULAIR) 10 MG tablet      (G47.00) Insomnia, unspecified type  Comment:   Plan: traZODone (DESYREL) 100 MG tablet              Patient has been advised of split billing requirements and indicates understanding: Yes      COUNSELING:  Reviewed preventive health counseling, as reflected " in patient instructions        She reports that she has never smoked. She has never used smokeless tobacco.      Appropriate preventive services were discussed with this patient, including applicable screening as appropriate for cardiovascular disease, diabetes, osteopenia/osteoporosis, and glaucoma.  As appropriate for age/gender, discussed screening for colorectal cancer, prostate cancer, breast cancer, and cervical cancer. Checklist reviewing preventive services available has been given to the patient.    Reviewed patients plan of care and provided an AVS. The Basic Care Plan (routine screening as documented in Health Maintenance) for Su meets the Care Plan requirement. This Care Plan has been established and reviewed with the Patient.      Jessica Smiht MD  [facility]    Identified Health Risks:

## 2023-03-22 ENCOUNTER — E-VISIT (OUTPATIENT)
Dept: INTERNAL MEDICINE | Facility: CLINIC | Age: 68
End: 2023-03-22
Payer: COMMERCIAL

## 2023-03-22 DIAGNOSIS — F41.9 ANXIETY: ICD-10-CM

## 2023-03-22 DIAGNOSIS — F41.1 GAD (GENERALIZED ANXIETY DISORDER): ICD-10-CM

## 2023-03-22 PROCEDURE — 99421 OL DIG E/M SVC 5-10 MIN: CPT | Performed by: INTERNAL MEDICINE

## 2023-03-22 RX ORDER — SERTRALINE HYDROCHLORIDE 100 MG/1
100 TABLET, FILM COATED ORAL DAILY
Qty: 90 TABLET | Refills: 3 | Status: SHIPPED | OUTPATIENT
Start: 2023-03-22 | End: 2023-10-31

## 2023-04-26 ENCOUNTER — TRANSFERRED RECORDS (OUTPATIENT)
Dept: HEALTH INFORMATION MANAGEMENT | Facility: CLINIC | Age: 68
End: 2023-04-26
Payer: COMMERCIAL

## 2023-04-27 ENCOUNTER — MYC MEDICAL ADVICE (OUTPATIENT)
Dept: INTERNAL MEDICINE | Facility: CLINIC | Age: 68
End: 2023-04-27
Payer: COMMERCIAL

## 2023-04-27 DIAGNOSIS — J45.20 MILD INTERMITTENT ASTHMA, UNCOMPLICATED: ICD-10-CM

## 2023-04-28 RX ORDER — ALBUTEROL SULFATE 90 UG/1
AEROSOL, METERED RESPIRATORY (INHALATION)
Qty: 18 G | Refills: 3 | Status: SHIPPED | OUTPATIENT
Start: 2023-04-28 | End: 2024-05-02

## 2023-05-19 ENCOUNTER — TRANSFERRED RECORDS (OUTPATIENT)
Dept: HEALTH INFORMATION MANAGEMENT | Facility: CLINIC | Age: 68
End: 2023-05-19
Payer: COMMERCIAL

## 2023-05-24 ENCOUNTER — TRANSFERRED RECORDS (OUTPATIENT)
Dept: HEALTH INFORMATION MANAGEMENT | Facility: CLINIC | Age: 68
End: 2023-05-24
Payer: COMMERCIAL

## 2023-06-30 ENCOUNTER — TELEPHONE (OUTPATIENT)
Dept: INTERNAL MEDICINE | Facility: CLINIC | Age: 68
End: 2023-06-30
Payer: COMMERCIAL

## 2023-06-30 NOTE — TELEPHONE ENCOUNTER
Patient calling.  States her dog tested positive for hookworm yesterday (but dog has been sick x 1 week and diarrhea x 3 days)  and she was exposed to the stool yesterday - touched the stool on accident before she knew dog had hookworm.  [facility] told patient that hookworm can be passed onto humans.    Cleaned her fingernails, washed with soap and water, and used hand  right away.  In the process of sanitizing her home.    Patient is not currently having symptoms.  She is asking for further advice.    Her GI told her to follow up with primary care provider for recommendation(s).  (Patient reported she just stopped having diarrhea from collagenous colitis on 6/26/23.)    Stool culture in 3 weeks (patient read the larvae hatches in 3 weeks)?    Treat prophylactically?    Please advise, thanks.

## 2023-06-30 NOTE — TELEPHONE ENCOUNTER
It is very unlikely that it would be transmitted as long as she cleaned her hands and is not walking barefoot around the dogs feces.    When hookworm transmits to humans it is through the skin when walking around the dog feces and bare feet.  It will cause a skin lesion in 5-10 days.     Hookworms that cause GI infections or lung infections are rare in the [place].    Because of the low risk I would not recommend treating without symptoms and positive testing.

## 2023-08-11 ENCOUNTER — TRANSFERRED RECORDS (OUTPATIENT)
Dept: HEALTH INFORMATION MANAGEMENT | Facility: CLINIC | Age: 68
End: 2023-08-11
Payer: COMMERCIAL

## 2023-08-22 ENCOUNTER — TELEPHONE (OUTPATIENT)
Dept: INTERNAL MEDICINE | Facility: CLINIC | Age: 68
End: 2023-08-22
Payer: COMMERCIAL

## 2023-08-22 NOTE — TELEPHONE ENCOUNTER
Patient heard that her pcp is leaving [place] and wondering if Dr. Smith could squeeze her in for a physical before she leaves. Will forward message to clinic to see if Dr. Smith could squeeze the patient in for a physical please call patient back to confirm.

## 2023-08-24 NOTE — TELEPHONE ENCOUNTER
Called patient and informed her the Dr Smith is not making appointment exceptions. She chose to cancel dec appointment. She will research other providers and  call back number back.

## 2023-09-08 ENCOUNTER — TRANSFERRED RECORDS (OUTPATIENT)
Dept: HEALTH INFORMATION MANAGEMENT | Facility: CLINIC | Age: 68
End: 2023-09-08
Payer: COMMERCIAL

## 2023-09-25 ENCOUNTER — TRANSFERRED RECORDS (OUTPATIENT)
Dept: HEALTH INFORMATION MANAGEMENT | Facility: CLINIC | Age: 68
End: 2023-09-25
Payer: COMMERCIAL

## 2023-09-28 ENCOUNTER — TRANSFERRED RECORDS (OUTPATIENT)
Dept: HEALTH INFORMATION MANAGEMENT | Facility: CLINIC | Age: 68
End: 2023-09-28

## 2023-09-28 ENCOUNTER — LAB REQUISITION (OUTPATIENT)
Dept: LAB | Facility: CLINIC | Age: 68
End: 2023-09-28
Payer: COMMERCIAL

## 2023-09-28 DIAGNOSIS — Z13.9 ENCOUNTER FOR SCREENING, UNSPECIFIED: ICD-10-CM

## 2023-09-28 DIAGNOSIS — Z13.29 ENCOUNTER FOR SCREENING FOR OTHER SUSPECTED ENDOCRINE DISORDER: ICD-10-CM

## 2023-09-28 DIAGNOSIS — Z13.220 ENCOUNTER FOR SCREENING FOR LIPOID DISORDERS: ICD-10-CM

## 2023-09-28 LAB
ALBUMIN SERPL BCG-MCNC: 4.4 G/DL (ref 3.5–5.2)
ALP SERPL-CCNC: 57 U/L (ref 35–104)
ALT SERPL W P-5'-P-CCNC: 20 U/L (ref 0–50)
ANION GAP SERPL CALCULATED.3IONS-SCNC: 11 MMOL/L (ref 7–15)
AST SERPL W P-5'-P-CCNC: 23 U/L (ref 0–45)
BASOPHILS # BLD AUTO: 0.1 10E3/UL (ref 0–0.2)
BASOPHILS NFR BLD AUTO: 1 %
BILIRUB SERPL-MCNC: 0.4 MG/DL
BUN SERPL-MCNC: 18.2 MG/DL (ref 8–23)
CALCIUM SERPL-MCNC: 9.7 MG/DL (ref 8.8–10.2)
CHLORIDE SERPL-SCNC: 103 MMOL/L (ref 98–107)
CHOLEST SERPL-MCNC: 228 MG/DL
CREAT SERPL-MCNC: 0.84 MG/DL (ref 0.51–0.95)
EGFRCR SERPLBLD CKD-EPI 2021: 76 ML/MIN/1.73M2
EOSINOPHIL # BLD AUTO: 0.4 10E3/UL (ref 0–0.7)
EOSINOPHIL NFR BLD AUTO: 7 %
ERYTHROCYTE [DISTWIDTH] IN BLOOD BY AUTOMATED COUNT: 13.7 % (ref 10–15)
GLUCOSE SERPL-MCNC: 93 MG/DL (ref 70–99)
HCO3 SERPL-SCNC: 28 MMOL/L (ref 22–29)
HCT VFR BLD AUTO: 40.8 % (ref 35–47)
HDLC SERPL-MCNC: 90 MG/DL
HGB BLD-MCNC: 13 G/DL (ref 11.7–15.7)
IMM GRANULOCYTES # BLD: 0 10E3/UL
IMM GRANULOCYTES NFR BLD: 0 %
LDLC SERPL CALC-MCNC: 118 MG/DL
LYMPHOCYTES # BLD AUTO: 1.9 10E3/UL (ref 0.8–5.3)
LYMPHOCYTES NFR BLD AUTO: 36 %
MCH RBC QN AUTO: 29.9 PG (ref 26.5–33)
MCHC RBC AUTO-ENTMCNC: 31.9 G/DL (ref 31.5–36.5)
MCV RBC AUTO: 94 FL (ref 78–100)
MONOCYTES # BLD AUTO: 0.5 10E3/UL (ref 0–1.3)
MONOCYTES NFR BLD AUTO: 9 %
NEUTROPHILS # BLD AUTO: 2.6 10E3/UL (ref 1.6–8.3)
NEUTROPHILS NFR BLD AUTO: 47 %
NONHDLC SERPL-MCNC: 138 MG/DL
NRBC # BLD AUTO: 0 10E3/UL
NRBC BLD AUTO-RTO: 0 /100
PLATELET # BLD AUTO: 277 10E3/UL (ref 150–450)
POTASSIUM SERPL-SCNC: 4.1 MMOL/L (ref 3.4–5.3)
PROT SERPL-MCNC: 6.9 G/DL (ref 6.4–8.3)
RBC # BLD AUTO: 4.35 10E6/UL (ref 3.8–5.2)
SODIUM SERPL-SCNC: 142 MMOL/L (ref 135–145)
TRIGL SERPL-MCNC: 100 MG/DL
TSH SERPL DL<=0.005 MIU/L-ACNC: 1.25 UIU/ML (ref 0.3–4.2)
WBC # BLD AUTO: 5.4 10E3/UL (ref 4–11)

## 2023-09-28 PROCEDURE — 84443 ASSAY THYROID STIM HORMONE: CPT | Mod: ORL | Performed by: OBSTETRICS & GYNECOLOGY

## 2023-09-28 PROCEDURE — 80053 COMPREHEN METABOLIC PANEL: CPT | Mod: ORL | Performed by: OBSTETRICS & GYNECOLOGY

## 2023-09-28 PROCEDURE — 80061 LIPID PANEL: CPT | Mod: ORL | Performed by: OBSTETRICS & GYNECOLOGY

## 2023-09-28 PROCEDURE — 85025 COMPLETE CBC W/AUTO DIFF WBC: CPT | Mod: ORL | Performed by: OBSTETRICS & GYNECOLOGY

## 2023-10-31 ENCOUNTER — VIRTUAL VISIT (OUTPATIENT)
Dept: INTERNAL MEDICINE | Facility: CLINIC | Age: 68
End: 2023-10-31
Payer: COMMERCIAL

## 2023-10-31 DIAGNOSIS — G47.00 INSOMNIA, UNSPECIFIED TYPE: ICD-10-CM

## 2023-10-31 DIAGNOSIS — K52.831 COLLAGENOUS COLITIS: ICD-10-CM

## 2023-10-31 DIAGNOSIS — J30.1 ALLERGIC RHINITIS DUE TO POLLEN, UNSPECIFIED SEASONALITY: ICD-10-CM

## 2023-10-31 DIAGNOSIS — E03.9 ACQUIRED HYPOTHYROIDISM: Primary | ICD-10-CM

## 2023-10-31 DIAGNOSIS — F41.9 ANXIETY: ICD-10-CM

## 2023-10-31 DIAGNOSIS — M85.80 OSTEOPENIA, UNSPECIFIED LOCATION: ICD-10-CM

## 2023-10-31 PROBLEM — K22.10 ULCER OF ESOPHAGUS: Status: ACTIVE | Noted: 2021-02-18

## 2023-10-31 PROBLEM — K44.9 DIAPHRAGMATIC HERNIA: Status: ACTIVE | Noted: 2021-02-18

## 2023-10-31 PROBLEM — R14.0 ABDOMINAL BLOATING: Status: ACTIVE | Noted: 2023-10-31

## 2023-10-31 PROBLEM — K57.30 DIVERTICULAR DISEASE OF LARGE INTESTINE: Status: ACTIVE | Noted: 2017-11-10

## 2023-10-31 PROBLEM — Z86.0100 HISTORY OF COLONIC POLYPS: Status: ACTIVE | Noted: 2018-11-29

## 2023-10-31 PROBLEM — R14.0 ABDOMINAL DISTENSION, GASEOUS: Status: ACTIVE | Noted: 2021-02-05

## 2023-10-31 PROBLEM — K22.10 EROSIVE ESOPHAGITIS: Status: ACTIVE | Noted: 2023-10-31

## 2023-10-31 PROBLEM — R19.7 DIARRHEA: Status: ACTIVE | Noted: 2023-10-31

## 2023-10-31 PROBLEM — D12.3 BENIGN NEOPLASM OF TRANSVERSE COLON: Status: ACTIVE | Noted: 2017-11-14

## 2023-10-31 PROBLEM — K57.92 DIVERTICULITIS: Status: ACTIVE | Noted: 2023-10-31

## 2023-10-31 PROBLEM — K22.2 OBSTRUCTION OF ESOPHAGUS: Status: ACTIVE | Noted: 2021-02-18

## 2023-10-31 PROBLEM — R10.84 GENERALIZED ABDOMINAL PAIN: Status: ACTIVE | Noted: 2023-10-31

## 2023-10-31 PROBLEM — K63.5 POLYP OF COLON: Status: ACTIVE | Noted: 2017-11-10

## 2023-10-31 PROCEDURE — 99214 OFFICE O/P EST MOD 30 MIN: CPT | Mod: VID | Performed by: INTERNAL MEDICINE

## 2023-10-31 ASSESSMENT — ASTHMA QUESTIONNAIRES: ACT_TOTALSCORE: 24

## 2023-10-31 ASSESSMENT — PATIENT HEALTH QUESTIONNAIRE - PHQ9
SUM OF ALL RESPONSES TO PHQ QUESTIONS 1-9: 1
SUM OF ALL RESPONSES TO PHQ QUESTIONS 1-9: 1
10. IF YOU CHECKED OFF ANY PROBLEMS, HOW DIFFICULT HAVE THESE PROBLEMS MADE IT FOR YOU TO DO YOUR WORK, TAKE CARE OF THINGS AT HOME, OR GET ALONG WITH OTHER PEOPLE: NOT DIFFICULT AT ALL

## 2023-10-31 NOTE — PROGRESS NOTES
Su is a 68 year old who is being evaluated via a billable video visit.      How would you like to obtain your AVS? Mail a copy  If the video visit is dropped, the invitation should be resent by: Text to cell phone: [phone]  Will anyone else be joining your video visit? No          Assessment & Plan     Acquired hypothyroidism  TSH is well controlled, continue current dose of medication, recheck in 1 year  - levothyroxine (SYNTHROID/LEVOTHROID) 88 MCG tablet; Take 1 tablet (88 mcg) by mouth daily    Anxiety  Stable, continue medication  - sertraline (ZOLOFT) 100 MG tablet; Take 1 tablet (100 mg) by mouth daily    Osteopenia, unspecified location  Advised to continue medication for another year, reassess bone density at that time and plan on 2-year drug holiday if not high risk  - RISEdronate (ACTONEL) 150 MG tablet; Take 1 tablet (150 mg) by mouth every 30 days    Collagenous colitis  Overall fairly stable, per GI    Insomnia, unspecified type  Stable, continue medication  - traZODone (DESYREL) 100 MG tablet; Take 1 tablet (100 mg) by mouth at bedtime    Allergic rhinitis due to pollen, unspecified seasonality  Stable, continue medication  - montelukast (SINGULAIR) 10 MG tablet; Take 1 tablet (10 mg) by mouth at bedtime      Jessica Smith MD  [facility]   Su is a 68 year old, presenting for the following health issues:  Recheck Medication      10/31/2023     3:58 PM   Additional Questions   Roomed by Светлана Tracey       History of Present Illness       Reason for visit:  Follow up on Risedronate and other medicationsShe consumes 0 sweetened beverage(s) daily.She exercises with enough effort to increase her heart rate 20 to 29 minutes per day.  She exercises with enough effort to increase her heart rate 4 days per week.   She is taking medications regularly.     Problems:  1.  Hypothyroidism: She had labs done at OB/GYN recently with good TSH.  No symptoms.  2.   Osteopenia: Her neurosurgeon had recommended she contact me about how long she should stay on the medication.  She has now been on it for 4 years so should have 1 more year.  She had started on it more based on findings of soft bone on her surgery rather than her FRAX score.  3.  Anxiety: Symptoms are fairly well controlled.  She has had a lot more stress this past year but feels she is coping with it.  4.  Collagenous colitis: Still has some GI symptoms.    Other concerns:  She had questions regarding her lipid panel which showed her LDL had increased but was still at 118.  She has good HDL of 90.    Patient Active Problem List   Diagnosis    Allergic rhinitis    Acquired hypothyroidism    Mild intermittent asthma    Collagenous colitis    Status post lumbar spinal fusion    Osteopenia, unspecified location    Anxiety    CARDIOVASCULAR SCREENING; LDL GOAL LESS THAN 130    Benign neoplasm of transverse colon    Diaphragmatic hernia    History of colonic polyps     Current Outpatient Medications   Medication Sig Dispense Refill    albuterol (VENTOLIN HFA) 108 (90 Base) MCG/ACT inhaler INHALE 1 TO 2 PUFFS INTO THE LUNGS EVERY 6 HOURS AS NEEDED 18 g 3    Biotin w/ Vitamins C & E 1250-7.5-7.5 MCG-MG-UNT CHEW Take 2 chew tab by mouth daily      Calcium Citrate-Vitamin D (CITRACAL MAXIMUM PO) Take 1,200 Units by mouth 2 times daily      EPINEPHrine (EPIPEN) 0.3 MG/0.3ML injection Inject 0.3 mLs (0.3 mg) into the muscle once as needed for anaphylaxis 0.6 mL 11    fluticasone (FLONASE) 50 MCG/ACT nasal spray Spray 1 spray into both nostrils daily      levocetirizine (XYZAL) 5 MG tablet Take 5 mg by mouth every morning      levothyroxine (SYNTHROID/LEVOTHROID) 88 MCG tablet Take 1 tablet (88 mcg) by mouth daily 90 tablet 3    melatonin 5 MG tablet Take 10 mg by mouth At Bedtime (5MG X 2 = 10MG)      montelukast (SINGULAIR) 10 MG tablet Take 1 tablet (10 mg) by mouth at bedtime 90 tablet 3    Multiple Vitamins-Minerals  (WOMENS MULTI VITAMIN & MINERAL) TABS Take 1 tablet by mouth daily      Probiotic Product (DAILY PROBIOTIC) CAPS Take 1 capsule by mouth daily      RISEdronate (ACTONEL) 150 MG tablet Take 1 tablet (150 mg) by mouth every 30 days 3 tablet 3    sertraline (ZOLOFT) 100 MG tablet Take 1 tablet (100 mg) by mouth daily 90 tablet 3    traZODone (DESYREL) 100 MG tablet Take 1 tablet (100 mg) by mouth at bedtime 90 tablet 3    Vitamin D, Cholecalciferol, 1000 units TABS Take 1,000 Units by mouth daily            Review of Systems   No fever, chills, shortness of breath, chest pain      Objective    Vitals - Patient Reported  Pain Score: No Pain (0)        Physical Exam   GENERAL: Healthy, alert and no distress  EYES: Eyes grossly normal to inspection.  No discharge or erythema, or obvious scleral/conjunctival abnormalities.  RESP: No audible wheeze, cough, or visible cyanosis.  No visible retractions or increased work of breathing.    SKIN: Visible skin clear. No significant rash, abnormal pigmentation or lesions.  NEURO: Cranial nerves grossly intact.  Mentation and speech appropriate for age.  PSYCH: Mentation appears normal, affect normal/bright, judgement and insight intact, normal speech and appearance well-groomed.        11/16/2021     8:31 AM 7/12/2022     1:19 PM 3/22/2023     4:04 PM   BRONWYN-7 SCORE   Total Score  0 (minimal anxiety) 12 (moderate anxiety)   Total Score 0 0 12                   Video-Visit Details    Type of service:  Video Visit     Originating Location (pt. Location): Home    Distant Location (provider location):  On-site  Platform used for Video Visit: Doroteo

## 2023-11-02 PROBLEM — K22.2 OBSTRUCTION OF ESOPHAGUS: Status: RESOLVED | Noted: 2021-02-18 | Resolved: 2023-11-02

## 2023-11-02 PROBLEM — K22.10 EROSIVE ESOPHAGITIS: Status: RESOLVED | Noted: 2023-10-31 | Resolved: 2023-11-02

## 2023-11-02 PROBLEM — K22.10 ULCER OF ESOPHAGUS: Status: RESOLVED | Noted: 2021-02-18 | Resolved: 2023-11-02

## 2023-11-02 PROBLEM — K63.5 POLYP OF COLON: Status: RESOLVED | Noted: 2017-11-10 | Resolved: 2023-11-02

## 2023-11-02 PROBLEM — R14.0 ABDOMINAL BLOATING: Status: RESOLVED | Noted: 2023-10-31 | Resolved: 2023-11-02

## 2023-11-02 PROBLEM — R10.84 GENERALIZED ABDOMINAL PAIN: Status: RESOLVED | Noted: 2023-10-31 | Resolved: 2023-11-02

## 2023-11-02 PROBLEM — R19.7 DIARRHEA: Status: RESOLVED | Noted: 2023-10-31 | Resolved: 2023-11-02

## 2023-11-02 PROBLEM — K57.92 DIVERTICULITIS: Status: RESOLVED | Noted: 2023-10-31 | Resolved: 2023-11-02

## 2023-11-02 PROBLEM — R14.0 ABDOMINAL DISTENSION, GASEOUS: Status: RESOLVED | Noted: 2021-02-05 | Resolved: 2023-11-02

## 2023-11-02 PROBLEM — K57.30 DIVERTICULAR DISEASE OF LARGE INTESTINE: Status: RESOLVED | Noted: 2017-11-10 | Resolved: 2023-11-02

## 2023-11-02 RX ORDER — TRAZODONE HYDROCHLORIDE 100 MG/1
100 TABLET ORAL AT BEDTIME
Qty: 90 TABLET | Refills: 3 | Status: SHIPPED | OUTPATIENT
Start: 2023-11-02 | End: 2024-05-02

## 2023-11-02 RX ORDER — RISEDRONATE SODIUM 150 MG/1
150 TABLET, FILM COATED ORAL
Qty: 3 TABLET | Refills: 3 | Status: SHIPPED | OUTPATIENT
Start: 2023-11-02 | End: 2024-05-02

## 2023-11-02 RX ORDER — MONTELUKAST SODIUM 10 MG/1
10 TABLET ORAL AT BEDTIME
Qty: 90 TABLET | Refills: 3 | Status: SHIPPED | OUTPATIENT
Start: 2023-11-02 | End: 2024-05-02

## 2023-11-02 RX ORDER — LEVOTHYROXINE SODIUM 88 UG/1
88 TABLET ORAL DAILY
Qty: 90 TABLET | Refills: 3 | Status: SHIPPED | OUTPATIENT
Start: 2023-11-02 | End: 2024-05-06

## 2023-11-02 RX ORDER — SERTRALINE HYDROCHLORIDE 100 MG/1
100 TABLET, FILM COATED ORAL DAILY
Qty: 90 TABLET | Refills: 3 | Status: SHIPPED | OUTPATIENT
Start: 2023-11-02 | End: 2024-01-26

## 2023-11-13 ENCOUNTER — MYC REFILL (OUTPATIENT)
Dept: INTERNAL MEDICINE | Facility: CLINIC | Age: 68
End: 2023-11-13
Payer: COMMERCIAL

## 2023-11-13 DIAGNOSIS — T78.01XA ANAPHYLACTIC REACTION DUE TO PEANUTS, INITIAL ENCOUNTER: ICD-10-CM

## 2023-11-14 RX ORDER — EPINEPHRINE 0.3 MG/.3ML
0.3 INJECTION SUBCUTANEOUS
Qty: 0.6 ML | Refills: 11 | Status: SHIPPED | OUTPATIENT
Start: 2023-11-14 | End: 2024-05-02

## 2023-11-15 ENCOUNTER — TRANSFERRED RECORDS (OUTPATIENT)
Dept: HEALTH INFORMATION MANAGEMENT | Facility: CLINIC | Age: 68
End: 2023-11-15
Payer: COMMERCIAL

## 2024-01-14 ENCOUNTER — HEALTH MAINTENANCE LETTER (OUTPATIENT)
Age: 69
End: 2024-01-14

## 2024-01-26 ENCOUNTER — MYC REFILL (OUTPATIENT)
Dept: INTERNAL MEDICINE | Facility: CLINIC | Age: 69
End: 2024-01-26
Payer: COMMERCIAL

## 2024-01-26 ENCOUNTER — PATIENT OUTREACH (OUTPATIENT)
Dept: GASTROENTEROLOGY | Facility: CLINIC | Age: 69
End: 2024-01-26
Payer: COMMERCIAL

## 2024-01-26 DIAGNOSIS — F41.9 ANXIETY: ICD-10-CM

## 2024-01-26 RX ORDER — SERTRALINE HYDROCHLORIDE 100 MG/1
100 TABLET, FILM COATED ORAL DAILY
Qty: 90 TABLET | Refills: 3 | Status: SHIPPED | OUTPATIENT
Start: 2024-01-26 | End: 2024-05-02

## 2024-04-25 SDOH — HEALTH STABILITY: PHYSICAL HEALTH: ON AVERAGE, HOW MANY MINUTES DO YOU ENGAGE IN EXERCISE AT THIS LEVEL?: 50 MIN

## 2024-04-25 SDOH — HEALTH STABILITY: PHYSICAL HEALTH: ON AVERAGE, HOW MANY DAYS PER WEEK DO YOU ENGAGE IN MODERATE TO STRENUOUS EXERCISE (LIKE A BRISK WALK)?: 4 DAYS

## 2024-04-25 ASSESSMENT — ASTHMA QUESTIONNAIRES
QUESTION_3 LAST FOUR WEEKS HOW OFTEN DID YOUR ASTHMA SYMPTOMS (WHEEZING, COUGHING, SHORTNESS OF BREATH, CHEST TIGHTNESS OR PAIN) WAKE YOU UP AT NIGHT OR EARLIER THAN USUAL IN THE MORNING: NOT AT ALL
QUESTION_5 LAST FOUR WEEKS HOW WOULD YOU RATE YOUR ASTHMA CONTROL: COMPLETELY CONTROLLED
QUESTION_2 LAST FOUR WEEKS HOW OFTEN HAVE YOU HAD SHORTNESS OF BREATH: NOT AT ALL
ACT_TOTALSCORE: 25
QUESTION_1 LAST FOUR WEEKS HOW MUCH OF THE TIME DID YOUR ASTHMA KEEP YOU FROM GETTING AS MUCH DONE AT WORK, SCHOOL OR AT HOME: NONE OF THE TIME
QUESTION_4 LAST FOUR WEEKS HOW OFTEN HAVE YOU USED YOUR RESCUE INHALER OR NEBULIZER MEDICATION (SUCH AS ALBUTEROL): NOT AT ALL

## 2024-04-25 ASSESSMENT — SOCIAL DETERMINANTS OF HEALTH (SDOH): HOW OFTEN DO YOU GET TOGETHER WITH FRIENDS OR RELATIVES?: MORE THAN THREE TIMES A WEEK

## 2024-04-25 NOTE — COMMUNITY RESOURCES LIST (ENGLISH)
April 25, 2024           YOUR PERSONALIZED LIST OF SERVICES & PROGRAMS           & SHELTER    Housing      [address] [place] [address] - Violence Prevention Services - Domestic Violence Shelter  PARMINDER Fernandez [address] (Distance: 7.3 miles)  Phone: [phone]) [phone]  Website: https://[address] [place] [address].org/  Language: English      [facility] - Shelter for individuals  [address] [place] [address] (Distance: 3.4 miles)  Language: English, Greek  Fee: Free      [facility] - Housing Stabilization Services  Phone: (201) 969-6050  Website: https://[employer]/Housing-Stabilization.html  Language: English  Hours: Mon 9:00 AM - 5:00 PM Tue 9:00 AM - 5:00 PM Wed 9:00 AM - 5:00 PM Thu 9:00 AM - 5:00 PM Fri 9:00 AM - 5:00 PM  Fee: Insurance  Accessibility: Deaf or hard of hearing, Translation services    Case Management      [facility] [facility] - Housing search assistance  [address] [place] [address] [address] [address] (Distance: 6.6 miles)  Phone: (169) [phone]  Language: English  Fee: Sliding scale, Self pay      [facility] - Housing search assistance  [address] [facility] [address] [facility] [place] [address] (Distance: 12.6 miles)  Phone: (899) 787-5185  Language: English  Fee: Free, Sliding scale, Insurance  Accessibility: Translation services, Ada accessible, Blind accommodation, Deaf or hard of hearing      [facility] - Housing Stabilization Services  Phone: (292) 513-8172  Website: https://homebasemn.com/  Language: English  Hours: Mon 8:00 AM - 4:00 PM Tue 8:00 AM - 4:00 PM Wed 8:00 AM - 4:00 PM Thu 8:00 AM - 4:00 PM Fri 8:00 AM - 4:00 PM  Fee: Free  Accessibility: Blind accommodation, Deaf or hard of hearing  Transportation Options: Free transportation    Drop-In Services      [facility] [place] - Elder Independent Living Program  [address] [place] [address] (Distance: 10.6 miles)  Website: https://www.amn.info/elders   Language: English      [facility] - [facility]  [address] [facility] [address] [place] [address] (Distance: 18.2 miles)  Phone: (125) 523-8080  Language: English  Fee: Free      [facility]UNDRY LOVE  Website: http://www.laundrylove.org               IMPORTANT NUMBERS & WEBSITES        Emergency Services  911  .   [facility]  211 http://211unitedway.org  .   Poison Control  (276) 741-4082 http://[place]poison.org http://wisconsinpoison.org  .     Suicide and Crisis Lifeline  988 http://988Context Labsline.org  .   Childhelp [place] Child Abuse Hotline  527.150.2947 http://Childhelphotline.org   .   National Sexual Assault Hotline  (137) 945-9500 (HOPE) http://SwitchForce.org   .     [place] Runaway Safeline  (188) 563-4823 (RUNAWAY) http://Bluetest.[employer] Medical  .   Pregnancy & Postpartum Support  Call/text 518-365-3332  [place]: http://ppsupportmn.org  [place]: http://Stopford Projects.com/wi  .   Substance Abuse National Helpline ([facility])  806-668-HELP (7745) http://Findtreatment.gov   .                DISCLAIMER: These resources have been generated via the Mini[employer] Platform. [employer] does not endorse any service providers mentioned in this resource list. [employer] does not guarantee that the services mentioned in this resource list will be available to you or will improve your health or wellness.    [facility]

## 2024-04-30 ASSESSMENT — ASTHMA QUESTIONNAIRES: ACT_TOTALSCORE: 25

## 2024-04-30 NOTE — COMMUNITY RESOURCES LIST (ENGLISH)
April 30, 2024           YOUR PERSONALIZED LIST OF SERVICES & PROGRAMS           & SHELTER    Housing      [address] [place] [address] - Violence Prevention Services - Domestic Violence Shelter  PARM[place]ER Fernandez [address] (Distance: 7.3 miles)  Phone: [phone]  Website: https://[address] [place] [address].org/  Language: English      [facility]) [facility] - Shelter for individuals  [address] [place] [address] (Distance: 3.4 miles)  Language: English, Uzbek  Fee: Free      [facility] - Housing Stabilization Services  Phone: (241) 339-2793  Website: https://[employer]/Housing-Stabilization.html  Language: English  Hours: Mon 9:00 AM - 5:00 PM Tue 9:00 AM - 5:00 PM Wed 9:00 AM - 5:00 PM Thu 9:00 AM - 5:00 PM Fri 9:00 AM - 5:00 PM  Fee: Insurance  Accessibility: Deaf or hard of hearing, Translation services    Case Management      [facility]ousing Online - https://Waps.cn/  [address] [place] [address] (Distance: 18.8 miles)  Website: https://Waps.cn  Language: English      - Online housing search assistance  [address] [address] (Distance: 19.3 miles)  Phone: (831) 626-8305  Website: http://www.Pubsterlink.org/  Language: English, Uzbek, Swedish, Hmong  Accessibility: [place] [employer] - Housing Stabilization Services  Phone: (197) 464-3316  Website: https://homebasemn.com/  Language: English  Hours: Mon 8:00 AM - 4:00 PM Tue 8:00 AM - 4:00 PM Wed 8:00 AM - 4:00 PM Thu 8:00 AM - 4:00 PM Fri 8:00 AM - 4:00 PM  Fee: Free  Accessibility: Blind accommodation, Deaf or hard of hearing  Transportation Options: Free transportation    Drop-In Services      [facility] - Elder Independent Living Program  [address] [place] [address] (Distance: 10.6 miles)  Website: https://www.amn.info/elders  Language: English      [facility] - [facility]  [facility]  [address] [place] [address] (Distance: 18.2 miles)  Phone: (365) 850-3736  Language: English  Fee: [facility] - MAIL SERVICE FOR THE HOMELESS  Phone: (114) 883-1273  Website: http://www.Medical Referral Source               IMPORTANT NUMBERS & WEBSITES        Emergency Services  911  .   [employer]  211 http://211unitedway.org  .   Poison Control  (838) 417-8593 http://mnpoison.org http://wisconsinpoison.org  .     Suicide and Crisis Lifeline  988 http://988sofatutorline.WineNice  .   Childhelp [place] Child Abuse Hotline  931.851.1700 http://Childhelphotline.org   .   National Sexual Assault Hotline  (346) 602-4169 (HOPE) http://Integra Telecomn.org   .     [place] Runaway Safeline  (980) 218-6624 (RUNAWAY) http://ITelagen.WineNice  .   Pregnancy & Postpartum Support  Call/text 665-940-8770  [place]: http://ppsupportmn.org  [place]: http://psichapters.com/wi  .   Substance Abuse National Helpline ([facility])  968-188-HELP (5931) http://Findtreatment.gov   .                DISCLAIMER: These resources have been generated via the [employer] Platform. [employer] does not endorse any service providers mentioned in this resource list. [employer] does not guarantee that the services mentioned in this resource list will be available to you or will improve your health or wellness.    [facility]

## 2024-05-01 SDOH — HEALTH STABILITY: PHYSICAL HEALTH: ON AVERAGE, HOW MANY MINUTES DO YOU ENGAGE IN EXERCISE AT THIS LEVEL?: 40 MIN

## 2024-05-01 SDOH — HEALTH STABILITY: PHYSICAL HEALTH: ON AVERAGE, HOW MANY DAYS PER WEEK DO YOU ENGAGE IN MODERATE TO STRENUOUS EXERCISE (LIKE A BRISK WALK)?: 4 DAYS

## 2024-05-01 ASSESSMENT — PATIENT HEALTH QUESTIONNAIRE - PHQ9
SUM OF ALL RESPONSES TO PHQ QUESTIONS 1-9: 3
10. IF YOU CHECKED OFF ANY PROBLEMS, HOW DIFFICULT HAVE THESE PROBLEMS MADE IT FOR YOU TO DO YOUR WORK, TAKE CARE OF THINGS AT HOME, OR GET ALONG WITH OTHER PEOPLE: NOT DIFFICULT AT ALL
SUM OF ALL RESPONSES TO PHQ QUESTIONS 1-9: 3

## 2024-05-01 ASSESSMENT — SOCIAL DETERMINANTS OF HEALTH (SDOH): HOW OFTEN DO YOU GET TOGETHER WITH FRIENDS OR RELATIVES?: MORE THAN THREE TIMES A WEEK

## 2024-05-02 ENCOUNTER — OFFICE VISIT (OUTPATIENT)
Dept: INTERNAL MEDICINE | Facility: CLINIC | Age: 69
End: 2024-05-02
Payer: COMMERCIAL

## 2024-05-02 VITALS
DIASTOLIC BLOOD PRESSURE: 70 MMHG | SYSTOLIC BLOOD PRESSURE: 129 MMHG | RESPIRATION RATE: 16 BRPM | HEIGHT: 61 IN | HEART RATE: 63 BPM | WEIGHT: 155 LBS | OXYGEN SATURATION: 98 % | BODY MASS INDEX: 29.27 KG/M2 | TEMPERATURE: 97.3 F

## 2024-05-02 DIAGNOSIS — Z78.0 ENCOUNTER FOR OSTEOPOROSIS SCREENING IN ASYMPTOMATIC POSTMENOPAUSAL PATIENT: ICD-10-CM

## 2024-05-02 DIAGNOSIS — E55.9 VITAMIN D DEFICIENCY: ICD-10-CM

## 2024-05-02 DIAGNOSIS — M85.80 OSTEOPENIA, UNSPECIFIED LOCATION: ICD-10-CM

## 2024-05-02 DIAGNOSIS — Z13.1 SCREENING FOR DIABETES MELLITUS: ICD-10-CM

## 2024-05-02 DIAGNOSIS — Z13.820 ENCOUNTER FOR OSTEOPOROSIS SCREENING IN ASYMPTOMATIC POSTMENOPAUSAL PATIENT: ICD-10-CM

## 2024-05-02 DIAGNOSIS — J30.1 ALLERGIC RHINITIS DUE TO POLLEN, UNSPECIFIED SEASONALITY: ICD-10-CM

## 2024-05-02 DIAGNOSIS — E03.9 ACQUIRED HYPOTHYROIDISM: ICD-10-CM

## 2024-05-02 DIAGNOSIS — G47.00 INSOMNIA, UNSPECIFIED TYPE: ICD-10-CM

## 2024-05-02 DIAGNOSIS — Z00.00 ENCOUNTER FOR PREVENTIVE HEALTH EXAMINATION: Primary | ICD-10-CM

## 2024-05-02 DIAGNOSIS — Z13.0 SCREENING FOR IRON DEFICIENCY ANEMIA: ICD-10-CM

## 2024-05-02 DIAGNOSIS — Z13.220 SCREENING FOR HYPERLIPIDEMIA: ICD-10-CM

## 2024-05-02 DIAGNOSIS — J45.20 MILD INTERMITTENT ASTHMA, UNCOMPLICATED: ICD-10-CM

## 2024-05-02 DIAGNOSIS — F41.9 ANXIETY: ICD-10-CM

## 2024-05-02 DIAGNOSIS — T78.01XA ANAPHYLACTIC REACTION DUE TO PEANUTS, INITIAL ENCOUNTER: ICD-10-CM

## 2024-05-02 LAB
ERYTHROCYTE [DISTWIDTH] IN BLOOD BY AUTOMATED COUNT: 12.9 % (ref 10–15)
HCT VFR BLD AUTO: 40.3 % (ref 35–47)
HGB BLD-MCNC: 13.2 G/DL (ref 11.7–15.7)
MCH RBC QN AUTO: 30.3 PG (ref 26.5–33)
MCHC RBC AUTO-ENTMCNC: 32.8 G/DL (ref 31.5–36.5)
MCV RBC AUTO: 92 FL (ref 78–100)
PLATELET # BLD AUTO: 277 10E3/UL (ref 150–450)
RBC # BLD AUTO: 4.36 10E6/UL (ref 3.8–5.2)
WBC # BLD AUTO: 6.2 10E3/UL (ref 4–11)

## 2024-05-02 PROCEDURE — 99397 PER PM REEVAL EST PAT 65+ YR: CPT

## 2024-05-02 PROCEDURE — 85027 COMPLETE CBC AUTOMATED: CPT

## 2024-05-02 PROCEDURE — 80061 LIPID PANEL: CPT

## 2024-05-02 PROCEDURE — 82306 VITAMIN D 25 HYDROXY: CPT

## 2024-05-02 PROCEDURE — 84443 ASSAY THYROID STIM HORMONE: CPT

## 2024-05-02 PROCEDURE — 80048 BASIC METABOLIC PNL TOTAL CA: CPT

## 2024-05-02 PROCEDURE — 36415 COLL VENOUS BLD VENIPUNCTURE: CPT

## 2024-05-02 RX ORDER — SERTRALINE HYDROCHLORIDE 100 MG/1
100 TABLET, FILM COATED ORAL DAILY
Qty: 90 TABLET | Refills: 3 | Status: SHIPPED | OUTPATIENT
Start: 2024-05-02 | End: 2024-06-20

## 2024-05-02 RX ORDER — MONTELUKAST SODIUM 10 MG/1
10 TABLET ORAL AT BEDTIME
Qty: 90 TABLET | Refills: 3 | Status: SHIPPED | OUTPATIENT
Start: 2024-05-02

## 2024-05-02 RX ORDER — TRAZODONE HYDROCHLORIDE 100 MG/1
100 TABLET ORAL AT BEDTIME
Qty: 90 TABLET | Refills: 3 | Status: SHIPPED | OUTPATIENT
Start: 2024-05-02

## 2024-05-02 RX ORDER — ALBUTEROL SULFATE 90 UG/1
AEROSOL, METERED RESPIRATORY (INHALATION)
Qty: 18 G | Refills: 3 | Status: SHIPPED | OUTPATIENT
Start: 2024-05-02

## 2024-05-02 RX ORDER — EPINEPHRINE 0.3 MG/.3ML
0.3 INJECTION SUBCUTANEOUS
Qty: 0.6 ML | Refills: 11 | Status: SHIPPED | OUTPATIENT
Start: 2024-05-02

## 2024-05-02 RX ORDER — RISEDRONATE SODIUM 150 MG/1
150 TABLET, FILM COATED ORAL
Qty: 3 TABLET | Refills: 3 | Status: SHIPPED | OUTPATIENT
Start: 2024-05-02 | End: 2024-09-05

## 2024-05-02 ASSESSMENT — ANXIETY QUESTIONNAIRES
GAD7 TOTAL SCORE: 1
2. NOT BEING ABLE TO STOP OR CONTROL WORRYING: NOT AT ALL
GAD7 TOTAL SCORE: 1
3. WORRYING TOO MUCH ABOUT DIFFERENT THINGS: NOT AT ALL
6. BECOMING EASILY ANNOYED OR IRRITABLE: NOT AT ALL
4. TROUBLE RELAXING: NOT AT ALL
IF YOU CHECKED OFF ANY PROBLEMS ON THIS QUESTIONNAIRE, HOW DIFFICULT HAVE THESE PROBLEMS MADE IT FOR YOU TO DO YOUR WORK, TAKE CARE OF THINGS AT HOME, OR GET ALONG WITH OTHER PEOPLE: NOT DIFFICULT AT ALL
7. FEELING AFRAID AS IF SOMETHING AWFUL MIGHT HAPPEN: NOT AT ALL
7. FEELING AFRAID AS IF SOMETHING AWFUL MIGHT HAPPEN: NOT AT ALL
5. BEING SO RESTLESS THAT IT IS HARD TO SIT STILL: NOT AT ALL
8. IF YOU CHECKED OFF ANY PROBLEMS, HOW DIFFICULT HAVE THESE MADE IT FOR YOU TO DO YOUR WORK, TAKE CARE OF THINGS AT HOME, OR GET ALONG WITH OTHER PEOPLE?: NOT DIFFICULT AT ALL
1. FEELING NERVOUS, ANXIOUS, OR ON EDGE: SEVERAL DAYS

## 2024-05-02 NOTE — PROGRESS NOTES
Preventive Care Visit  [facility]  ANDRÉS May CNP, Internal Medicine  May 2, 2024      Assessment & Plan     (Z00.00) Encounter for preventive health examination  (primary encounter diagnosis)  Comment: Pt presents for annual visit  Plan:     (F41.9) Anxiety  Comment: Might want to taper off of Zoloft once she retires  Plan: sertraline (ZOLOFT) 100 MG tablet            (G47.00) Insomnia, unspecified type  Comment:   Plan: traZODone (DESYREL) 100 MG tablet            (J30.1) Allergic rhinitis due to pollen, unspecified seasonality  Comment:   Plan: montelukast (SINGULAIR) 10 MG tablet            (J45.20) Mild intermittent asthma, uncomplicated  Comment:   Plan: albuterol (VENTOLIN HFA) 108 (90 Base) MCG/ACT         inhaler            (Z13.820,  Z78.0) Encounter for osteoporosis screening in asymptomatic postmenopausal patient  Comment:   Plan: DX Bone Density            (E55.9) Vitamin D deficiency  Comment:   Plan: Vitamin D Deficiency            (Z13.1) Screening for diabetes mellitus  Comment:   Plan: Basic metabolic panel  (Ca, Cl, CO2, Creat,         Gluc, K, Na, BUN)            (Z13.220) Screening for hyperlipidemia  Comment:   Plan: Lipid panel reflex to direct LDL Fasting          (Z13.0) Screening for iron deficiency anemia  Comment:   Plan: CBC with platelets            (T78.01XA) Anaphylactic reaction due to peanuts, initial encounter  Comment:   Plan: EPINEPHrine (ANY BX GENERIC EQUIV) 0.3 MG/0.3ML        injection 2-pack            (E03.9) Acquired hypothyroidism  Comment:   Plan: TSH with free T4 reflex            (M85.80) Osteopenia, unspecified location  Comment: Fosamax from November 2019- January 2021 (this was stopped due to GI upset)  Actonel start in April of 2021.   Due to have DXA updated  Plan: RISEdronate (ACTONEL) 150 MG tablet                BMI  Estimated body mass index is 29.05 kg/m  as calculated from the following:    Height as of this encounter: 1.556  "m (5' 1.25\").    Weight as of this encounter: 70.3 kg (155 lb).       Counseling  Appropriate preventive services were discussed with this patient, including applicable screening as appropriate for fall prevention, nutrition, physical activity, Tobacco-use cessation, weight loss and cognition.  Checklist reviewing preventive services available has been given to the patient.  Reviewed patient's diet, addressing concerns and/or questions.   Discussed possible causes of fatigue.         Le Blue is a 69 year old, presenting for the following:  Wellness Visit        5/2/2024     9:16 AM   Additional Questions   Roomed by Yamel    Vision   Both eyes =20/15  Left =20/20  Right =20/15    Health Care Directive  Patient has a Health Care Directive on file      HPI              5/1/2024   General Health   How would you rate your overall physical health? Good   Feel stress (tense, anxious, or unable to sleep) To some extent   (!) STRESS CONCERN      5/1/2024   Nutrition   Diet: Regular (no restrictions)    Low salt    Carbohydrate counting    Gluten-free/reduced         5/1/2024   Exercise   Days per week of moderate/strenous exercise 4 days   Average minutes spent exercising at this level 40 min         5/1/2024   Social Factors   Frequency of gathering with friends or relatives More than three times a week   Worry food won't last until get money to buy more No   Food not last or not have enough money for food? No   Do you have housing?  Yes   Are you worried about losing your housing? No   Lack of transportation? No   Unable to get utilities (heat,electricity)? No         5/1/2024   Fall Risk   Fallen 2 or more times in the past year? No   Trouble with walking or balance? No          5/1/2024   Activities of Daily Living- Home Safety   Needs help with the following daily activites None of the above   Safety concerns in the home None of the above         5/1/2024   Dental   Dentist two times every year? Yes         " 5/1/2024   Hearing Screening   Hearing concerns? None of the above         5/1/2024   Driving Risk Screening   Patient/family members have concerns about driving No         5/1/2024   General Alertness/Fatigue Screening   Have you been more tired than usual lately? (!) YES         5/1/2024   Urinary Incontinence Screening   Bothered by leaking urine in past 6 months No          Today's PHQ-9 Score:       5/1/2024    11:35 AM   PHQ-9 SCORE   PHQ-9 Total Score MyChart 3 (Minimal depression)   PHQ-9 Total Score 3         5/1/2024   Substance Use   Alcohol more than 3/day or more than 7/wk No   Do you have a current opioid prescription? No   How severe/bad is pain from 1 to 10? 1/10   Do you use any other substances recreationally? No     Social History     Tobacco Use    Smoking status: Never     Passive exposure: Never    Smokeless tobacco: Never    Tobacco comments:     second hand smoke by mother during childhood   Vaping Use    Vaping status: Never Used   Substance Use Topics    Alcohol use: Yes     Comment: twice  weekly    Drug use: No           11/25/2022   LAST FHS-7 RESULTS   1st degree relative breast or ovarian cancer Yes   Any relative bilateral breast cancer Yes   Any male have breast cancer No   Any ONE woman have BOTH breast AND ovarian cancer Yes   Any woman with breast cancer before 50yrs Yes   2 or more relatives with breast AND/OR ovarian cancer Unknown   2 or more relatives with breast AND/OR bowel cancer Unknown         -her sister Zoe had bilateral breast cancer.  BCRAT: her lifetime risk is 13.5%, so she does not qualify for high risk screening                ASCVD Risk   The 10-year ASCVD risk score (Devonte SILVEIRA, et al., 2019) is: 6.3%    Values used to calculate the score:      Age: 69 years      Sex: Female      Is Non- : No      Diabetic: No      Tobacco smoker: No      Systolic Blood Pressure: 129 mmHg      Is BP treated: No      HDL Cholesterol: 90 mg/dL      " Total Cholesterol: 228 mg/dL            Reviewed and updated as needed this visit by Provider                      Current providers sharing in care for this patient include:  Patient Care Team:  No Ref-Primary, Physician as PCP - Valerio Cain DO as Assigned PCP    The following health maintenance items are reviewed in Epic and correct as of today:  Health Maintenance   Topic Date Due    Pneumococcal Vaccine: 65+ Years (1 of 2 - PCV) Never done    ZOSTER IMMUNIZATION (1 of 2) Never done    RSV VACCINE (Pregnancy & 60+) (1 - 1-dose 60+ series) Never done    ASTHMA ACTION PLAN  11/12/2021    MEDICARE ANNUAL WELLNESS VISIT  12/02/2023    COVID-19 Vaccine (5 - 2023-24 season) 03/07/2024    MAMMO SCREENING  08/11/2024    ADVANCE CARE PLANNING  09/27/2024    TSH W/FREE T4 REFLEX  09/28/2024    ASTHMA CONTROL TEST  11/02/2024    PHQ-9  11/02/2024    BRONWYN ASSESSMENT  05/02/2025    ANNUAL REVIEW OF HM ORDERS  05/02/2025    FALL RISK ASSESSMENT  05/02/2025    COLORECTAL CANCER SCREENING  04/30/2026    GLUCOSE  09/28/2026    DTAP/TDAP/TD IMMUNIZATION (4 - Td or Tdap) 08/01/2027    LIPID  09/28/2028    DEXA  12/07/2036    HEPATITIS C SCREENING  Completed    DEPRESSION ACTION PLAN  Completed    MIGRAINE ACTION PLAN  Completed    INFLUENZA VACCINE  Completed    IPV IMMUNIZATION  Aged Out    HPV IMMUNIZATION  Aged Out    MENINGITIS IMMUNIZATION  Aged Out    RSV MONOCLONAL ANTIBODY  Aged Out    PAP  Discontinued       Review of Systems  CONSTITUTIONAL: NEGATIVE for fever, chills, change in weight  RESP: NEGATIVE for significant cough or SOB  CV: NEGATIVE for chest pain, palpitations or peripheral edema  GI: NEGATIVE for hematochezia        Objective    Exam  /70   Pulse 63   Temp 97.3  F (36.3  C) (Tympanic)   Resp 16   Ht 1.556 m (5' 1.25\")   Wt 70.3 kg (155 lb)   LMP  (LMP Unknown)   SpO2 98%   BMI 29.05 kg/m     Estimated body mass index is 29.05 kg/m  as calculated from the following:    Height as of " "this encounter: 1.556 m (5' 1.25\").    Weight as of this encounter: 70.3 kg (155 lb).      Physical Exam  Constitutional:       General: She is not in acute distress.     Appearance: Normal appearance. She is not ill-appearing, toxic-appearing or diaphoretic.   HENT:      Head: Normocephalic and atraumatic.   Eyes:      Conjunctiva/sclera: Conjunctivae normal.   Cardiovascular:      Rate and Rhythm: Normal rate and regular rhythm.      Heart sounds: Normal heart sounds.   Pulmonary:      Effort: Pulmonary effort is normal.      Breath sounds: Normal breath sounds.   Skin:     General: Skin is warm and dry.   Neurological:      Mental Status: She is alert and oriented to person, place, and time.   Psychiatric:         Mood and Affect: Mood normal.         Behavior: Behavior normal.         Thought Content: Thought content normal.         Judgment: Judgment normal.            5/2/2024   Mini Cog   Clock Draw Score 2 Normal   3 Item Recall 3 objects recalled   Mini Cog Total Score 5             Signed Electronically by: ANDRÉS May CNP    Answers submitted by the patient for this visit:  Patient Health Questionnaire (Submitted on 5/1/2024)  If you checked off any problems, how difficult have these problems made it for you to do your work, take care of things at home, or get along with other people?: Not difficult at all  PHQ9 TOTAL SCORE: 3  BRONWYN-7 (Submitted on 5/2/2024)  BRONWYN 7 TOTAL SCORE: 1    "

## 2024-05-03 LAB
ANION GAP SERPL CALCULATED.3IONS-SCNC: 10 MMOL/L (ref 7–15)
BUN SERPL-MCNC: 17.8 MG/DL (ref 8–23)
CALCIUM SERPL-MCNC: 9.7 MG/DL (ref 8.8–10.2)
CHLORIDE SERPL-SCNC: 102 MMOL/L (ref 98–107)
CHOLEST SERPL-MCNC: 258 MG/DL
CREAT SERPL-MCNC: 0.75 MG/DL (ref 0.51–0.95)
DEPRECATED HCO3 PLAS-SCNC: 29 MMOL/L (ref 22–29)
EGFRCR SERPLBLD CKD-EPI 2021: 87 ML/MIN/1.73M2
FASTING STATUS PATIENT QL REPORTED: YES
GLUCOSE SERPL-MCNC: 97 MG/DL (ref 70–99)
HDLC SERPL-MCNC: 110 MG/DL
LDLC SERPL CALC-MCNC: 131 MG/DL
NONHDLC SERPL-MCNC: 148 MG/DL
POTASSIUM SERPL-SCNC: 4.1 MMOL/L (ref 3.4–5.3)
SODIUM SERPL-SCNC: 141 MMOL/L (ref 135–145)
TRIGL SERPL-MCNC: 85 MG/DL
TSH SERPL DL<=0.005 MIU/L-ACNC: 1.86 UIU/ML (ref 0.3–4.2)
VIT D+METAB SERPL-MCNC: 93 NG/ML (ref 20–50)

## 2024-05-06 RX ORDER — LEVOTHYROXINE SODIUM 88 UG/1
88 TABLET ORAL DAILY
Qty: 90 TABLET | Refills: 3 | Status: SHIPPED | OUTPATIENT
Start: 2024-05-06

## 2024-06-03 ENCOUNTER — MYC MEDICAL ADVICE (OUTPATIENT)
Dept: INTERNAL MEDICINE | Facility: CLINIC | Age: 69
End: 2024-06-03
Payer: COMMERCIAL

## 2024-06-04 NOTE — TELEPHONE ENCOUNTER
Please let her know that most all patients will gain the weight back after stopping medication.  If she takes it for only 6 months, she will likely gain back most if not all of her weight that she lost.  Wegovy is the most common weight loss medication for nondiabetics.  She could ask insurance if this is covered or any other alternative weight loss medications.  When she talks to her insurance plan regarding coverage-- she needs to make sure she lets them know she is not a diabetic.  Many insurance plans have requirements such as high cholesterol, high blood pressure, and a BMI greater than 40, etc. She should inquire with her insurance about the requirements

## 2024-06-12 ENCOUNTER — MYC MEDICAL ADVICE (OUTPATIENT)
Dept: INTERNAL MEDICINE | Facility: CLINIC | Age: 69
End: 2024-06-12
Payer: COMMERCIAL

## 2024-06-12 NOTE — TELEPHONE ENCOUNTER
Please see message below and advise. Does provider want to place order? Then nursing can route to PA team

## 2024-06-13 ENCOUNTER — TRANSFERRED RECORDS (OUTPATIENT)
Dept: HEALTH INFORMATION MANAGEMENT | Facility: CLINIC | Age: 69
End: 2024-06-13
Payer: COMMERCIAL

## 2024-06-13 NOTE — TELEPHONE ENCOUNTER
"Interval History:     Review of Systems  Objective:     Vital Signs (Most Recent):  Temp: 98 °F (36.7 °C) (12/20/23 0727)  Pulse: 72 (12/20/23 0727)  Resp: 18 (12/20/23 0727)  BP: (!) 162/72 (notified nurse q) (12/20/23 0727)  SpO2: 97 % (12/20/23 0727) Vital Signs (24h Range):  Temp:  [97.7 °F (36.5 °C)-98.1 °F (36.7 °C)] 98 °F (36.7 °C)  Pulse:  [72-84] 72  Resp:  [16-18] 18  SpO2:  [94 %-98 %] 97 %  BP: (133-162)/(67-81) 162/72     Weight: 84.7 kg (186 lb 11.7 oz)  Body mass index is 31.07 kg/m².    Intake/Output Summary (Last 24 hours) at 12/20/2023 1004  Last data filed at 12/20/2023 0600  Gross per 24 hour   Intake 930 ml   Output 750 ml   Net 180 ml         Physical Exam  Vitals and nursing note reviewed.   HENT:      Head: Normocephalic and atraumatic.   Eyes:      Conjunctiva/sclera: Conjunctivae normal.   Neck:      Vascular: No JVD.   Cardiovascular:      Rate and Rhythm: Normal rate.      Heart sounds: Normal heart sounds.   Pulmonary:      Effort: Pulmonary effort is normal.   Abdominal:      Palpations: Abdomen is soft.   Musculoskeletal:         General: Normal range of motion.   Skin:     General: Skin is warm.   Neurological:      Mental Status: She is alert and oriented to person, place, and time.   Psychiatric:         Mood and Affect: Mood normal.             Significant Labs: All pertinent labs within the past 24 hours have been reviewed.  CMP:   Recent Labs   Lab 12/18/23  1355   *   K 3.6   CL 98   CO2 24   *   BUN 10   CREATININE 0.4*   CALCIUM 8.7   PROT 7.0   ALBUMIN 3.1*   BILITOT 0.4   ALKPHOS 121   AST 31   ALT 30   ANIONGAP 13     Cardiac Markers: No results for input(s): "CKMB", "MYOGLOBIN", "BNP", "TROPISTAT" in the last 48 hours.  Coagulation: No results for input(s): "PT", "INR", "APTT" in the last 48 hours.    Significant Imaging: I have reviewed all pertinent imaging results/findings within the past 24 hours.  " Patient informed via mychart of provider's message below.

## 2024-06-13 NOTE — TELEPHONE ENCOUNTER
She will need to make video visit to discuss this as we need to make sure she does not have any contraindications to starting a GLP-1 and we need to talk about process of gradually increasing the dose, side effects, etc.

## 2024-06-20 ENCOUNTER — VIRTUAL VISIT (OUTPATIENT)
Dept: INTERNAL MEDICINE | Facility: CLINIC | Age: 69
End: 2024-06-20
Payer: COMMERCIAL

## 2024-06-20 DIAGNOSIS — F41.9 ANXIETY: ICD-10-CM

## 2024-06-20 DIAGNOSIS — K52.831 COLLAGENOUS COLITIS: ICD-10-CM

## 2024-06-20 DIAGNOSIS — E66.3 OVERWEIGHT: Primary | ICD-10-CM

## 2024-06-20 PROCEDURE — G2211 COMPLEX E/M VISIT ADD ON: HCPCS | Mod: 95

## 2024-06-20 PROCEDURE — 99214 OFFICE O/P EST MOD 30 MIN: CPT | Mod: 95

## 2024-06-20 RX ORDER — PHENTERMINE HYDROCHLORIDE 37.5 MG/1
37.5 TABLET ORAL
Qty: 90 TABLET | Refills: 0 | Status: SHIPPED | OUTPATIENT
Start: 2024-06-20

## 2024-06-20 NOTE — PROGRESS NOTES
Su is a 69 year old who is being evaluated via a billable video visit.    How would you like to obtain your AVS? MyChart  If the video visit is dropped, the invitation should be resent by: Text to cell phone: [phone]  Will anyone else be joining your video visit? No      Assessment & Plan     (E66.3) Overweight  (primary encounter diagnosis)  Comment:   She presents today to talk about weight loss medications. She initially wanted to talk about trying Wegovy.  She denies any history of pancreatitis, gastroparesis,   No family history of medullary thyroid cancer.      She has history of collagenous colitis for which she follows with GI. She was first diagnosed in 2018.  She has not discussed starting a GLP-1 with her GI provider yet. With her history of collagenous colitis, I am hesitant to start a GLP-1. I will need to look into this. After we discussed common GI side effects with GLP-1's, she is agreeable to trying alternative weight loss medication. We will try Phentermine. She will see me in 3 months for follow up. She is aware she needs to monitor BP outside of clinic      Plan: phentermine (ADIPEX-P) 37.5 MG tablet            (F41.9) Anxiety  Comment: She wants to try tapering off of her Zoloft.  I will have her decrease to 50MG for 2 weeks and then 25MG for 2 weeks and stop.  Plan: sertraline (ZOLOFT) 50 MG tablet          The longitudinal plan of care for the diagnosis(es)/condition(s) as documented were addressed during this visit. Due to the added complexity in care, I will continue to support Su in the subsequent management and with ongoing continuity of care.      Subjective   Su is a 69 year old, presenting for the following health issues:  Consult (Discuss about weight loss medication options.)      6/20/2024     9:26 AM   Additional Questions   Roomed by Odilia Prajapati MA   Accompanied by Herself     History of Present Illness       Reason for visit:  Weight Loss medications    She eats 4 or  more servings of fruits and vegetables daily.She consumes 0 sweetened beverage(s) daily.She exercises with enough effort to increase her heart rate 60 or more minutes per day.  She exercises with enough effort to increase her heart rate 4 days per week.   She is taking medications regularly.           Objective    Vitals - Patient Reported  Systolic (Patient Reported): 110 (Was taken at home couple weeks ago.)  Diastolic (Patient Reported): 70  Weight (Patient Reported): 71.7 kg (158 lb)        Physical Exam   GENERAL: alert and no distress  EYES: Eyes grossly normal to inspection.  No discharge or erythema, or obvious scleral/conjunctival abnormalities.  RESP: No audible wheeze, cough, or visible cyanosis.    SKIN: Visible skin clear. No significant rash, abnormal pigmentation or lesions.  NEURO: Cranial nerves grossly intact.  Mentation and speech appropriate for age.  PSYCH: Appropriate affect, tone, and pace of words          Video-Visit Details    Type of service:  Video Visit   Originating Location (pt. Location): Home    Distant Location (provider location):  On-site  Platform used for Video Visit: Doroteo  Signed Electronically by: ANDRÉS May CNP

## 2024-07-17 ENCOUNTER — MYC MEDICAL ADVICE (OUTPATIENT)
Dept: INTERNAL MEDICINE | Facility: CLINIC | Age: 69
End: 2024-07-17
Payer: COMMERCIAL

## 2024-07-17 DIAGNOSIS — G47.00 INSOMNIA, UNSPECIFIED TYPE: Primary | ICD-10-CM

## 2024-07-31 ENCOUNTER — ANCILLARY PROCEDURE (OUTPATIENT)
Dept: BONE DENSITY | Facility: CLINIC | Age: 69
End: 2024-07-31
Payer: COMMERCIAL

## 2024-07-31 DIAGNOSIS — Z13.820 ENCOUNTER FOR OSTEOPOROSIS SCREENING IN ASYMPTOMATIC POSTMENOPAUSAL PATIENT: ICD-10-CM

## 2024-07-31 DIAGNOSIS — Z78.0 ENCOUNTER FOR OSTEOPOROSIS SCREENING IN ASYMPTOMATIC POSTMENOPAUSAL PATIENT: ICD-10-CM

## 2024-07-31 PROCEDURE — 77081 DXA BONE DENSITY APPENDICULR: CPT | Mod: TC | Performed by: PHYSICIAN ASSISTANT

## 2024-07-31 PROCEDURE — 77080 DXA BONE DENSITY AXIAL: CPT | Mod: TC | Performed by: PHYSICIAN ASSISTANT

## 2024-08-09 ENCOUNTER — MYC MEDICAL ADVICE (OUTPATIENT)
Dept: INTERNAL MEDICINE | Facility: CLINIC | Age: 69
End: 2024-08-09
Payer: COMMERCIAL

## 2024-08-09 NOTE — TELEPHONE ENCOUNTER
See [employer] message. Should she schedule VV or check with insurance for options?

## 2024-08-20 ENCOUNTER — TRANSFERRED RECORDS (OUTPATIENT)
Dept: HEALTH INFORMATION MANAGEMENT | Facility: CLINIC | Age: 69
End: 2024-08-20
Payer: COMMERCIAL

## 2024-08-21 ENCOUNTER — TRANSFERRED RECORDS (OUTPATIENT)
Dept: HEALTH INFORMATION MANAGEMENT | Facility: CLINIC | Age: 69
End: 2024-08-21
Payer: COMMERCIAL

## 2024-08-28 ENCOUNTER — TRANSFERRED RECORDS (OUTPATIENT)
Dept: HEALTH INFORMATION MANAGEMENT | Facility: CLINIC | Age: 69
End: 2024-08-28
Payer: COMMERCIAL

## 2024-09-05 ENCOUNTER — VIRTUAL VISIT (OUTPATIENT)
Dept: INTERNAL MEDICINE | Facility: CLINIC | Age: 69
End: 2024-09-05
Payer: COMMERCIAL

## 2024-09-05 DIAGNOSIS — K31.89 REACTIVE GASTROPATHY: ICD-10-CM

## 2024-09-05 DIAGNOSIS — E03.9 ACQUIRED HYPOTHYROIDISM: ICD-10-CM

## 2024-09-05 DIAGNOSIS — M85.80 OSTEOPENIA, UNSPECIFIED LOCATION: Primary | ICD-10-CM

## 2024-09-05 PROCEDURE — 99214 OFFICE O/P EST MOD 30 MIN: CPT | Mod: 95

## 2024-09-05 PROCEDURE — G2211 COMPLEX E/M VISIT ADD ON: HCPCS | Mod: 95

## 2024-09-05 NOTE — PROGRESS NOTES
Su is a 69 year old who is being evaluated via a billable video visit.    How would you like to obtain your AVS? MyChart  If the video visit is dropped, the invitation should be resent by: Text to cell phone: [phone]  Will anyone else be joining your video visit? No      Assessment & Plan     (M85.80) Osteopenia, unspecified location  (primary encounter diagnosis)  Comment: see details below  Plan: Adult Endocrinology  Referral            (K31.89) Reactive gastropathy  Comment:   She had endoscopy done on 8/28/24 through [facility] for ongoing abdominal pain, bloating and burning sensation. It looks like she was found to have reactive gastropathy.   She stopped the Actonel about 2 months ago due to these symptoms and does feel that her symptoms have improved over the past 2 months.     Su was started on Fosamax in October of 2019 and this was discontinued in December of 2020 due to GI side effects.  Actonel was started in January of 2021 and she has been on this up until July of 2024 when she decided to self discontinue this due to ongoing abdominal pain.   Her bone density scan was last updated in July 2024 and appeared stable.  I do think she may be a candidate for a 2 year drug holiday.  DEXA should be repeated in 2026 and at that point we could decide to use Prolia if needed.   She tells me that she is interested in seeing an endocrinologist for this and I will go ahead and place the referral today.  I think it is just fine for her to remain off of the Actonel for now until she is seen by endocrinology to obtain their expertise opinion.      The longitudinal plan of care for the diagnosis(es)/condition(s) as documented were addressed during this visit. Due to the added complexity in care, I will continue to support Su in the subsequent management and with ongoing continuity of care.    30 minutes spent on the date of the encounter doing chart review, history and exam, documentation and further  activities per the note       Subjective   Su is a 69 year old, presenting for the following health issues:  Results        9/5/2024     3:09 PM   Additional Questions   Roomed by Yamel CARPENTER     Follow up endoscopy             Objective           Vitals:  No vitals were obtained today due to virtual visit.    Physical Exam   GENERAL: alert and no distress  EYES: Eyes grossly normal to inspection.  No discharge or erythema, or obvious scleral/conjunctival abnormalities.  RESP: No audible wheeze, cough, or visible cyanosis.    SKIN: Visible skin clear. No significant rash, abnormal pigmentation or lesions.  NEURO: Cranial nerves grossly intact.  Mentation and speech appropriate for age.  PSYCH: Appropriate affect, tone, and pace of words          Video-Visit Details    Type of service:  Video Visit   Originating Location (pt. Location): Home    Distant Location (provider location):  On-site  Platform used for Video Visit: Doroteo  Signed Electronically by: ANDRÉS May CNP

## 2024-09-05 NOTE — PROGRESS NOTES
"Su is a 69 year old who is being evaluated via a billable video visit.    How would you like to obtain your AVS? MyChart  If the video visit is dropped, the invitation should be resent by: Text to cell phone: [phone]  Will anyone else be joining your video visit? No  {If patient encounters technical issues they should call [phone] :794531}    {PROVIDER CHARTING PREFERENCE:054805}    Subjective   Su is a 69 year old, presenting for the following health issues:  No chief complaint on file.  {(!) Visit Details have not yet been documented.  Please enter Visit Details and then use this list to pull in documentation. (Optional):681889}  HPI     {SUPERLIST (Optional):984749}  {additonal problems for provider to add (Optional):302519}    {ROS Picklists (Optional):704805}      Objective           Vitals:  No vitals were obtained today due to virtual visit.    Physical Exam   {video visit exam brief selected:020262}    {Diagnostic Test Results (Optional):260641}      Video-Visit Details    Type of service:  Video Visit   Originating Location (pt. Location): {video visit patient location:603292::\"Home\"}  {PROVIDER LOCATION On-site should be selected for visits conducted from your clinic location or adjoining [facility] hospital, academic office, or other nearby [facility] building. Off-site should be selected for all other provider locations, including home:851972}  Distant Location (provider location):  {virtual location provider:494123}  Platform used for Video Visit: {Virtual Visit Platforms:271244::\"Hitwise\"}  Signed Electronically by: ANDRÉS May CNP  {Email feedback regarding this note to [email or web address]   :092011}  "

## 2024-09-07 ENCOUNTER — TRANSFERRED RECORDS (OUTPATIENT)
Dept: HEALTH INFORMATION MANAGEMENT | Facility: CLINIC | Age: 69
End: 2024-09-07
Payer: COMMERCIAL

## 2024-09-07 ENCOUNTER — MYC MEDICAL ADVICE (OUTPATIENT)
Dept: INTERNAL MEDICINE | Facility: CLINIC | Age: 69
End: 2024-09-07
Payer: COMMERCIAL

## 2024-09-07 DIAGNOSIS — G62.9 NEUROPATHY: Primary | ICD-10-CM

## 2024-09-07 DIAGNOSIS — E55.9 VITAMIN D DEFICIENCY: ICD-10-CM

## 2024-09-07 DIAGNOSIS — E03.9 ACQUIRED HYPOTHYROIDISM: ICD-10-CM

## 2024-09-07 DIAGNOSIS — Z13.220 SCREENING FOR HYPERLIPIDEMIA: ICD-10-CM

## 2024-09-07 DIAGNOSIS — Z13.1 SCREENING FOR DIABETES MELLITUS: ICD-10-CM

## 2024-09-09 NOTE — TELEPHONE ENCOUNTER
It is possible this is neuropathy.  Oftentimes we cannot say this is true neuropathy without doing diagnostic testing called an EMG.  If she is interested in this, I am happy to place an order for her to see a neurologist.

## 2024-09-10 NOTE — TELEPHONE ENCOUNTER
[employer] message sent to patient with provider recommendation.     Summer RN 7:53 AM September 10, 2024   [facility]

## 2024-09-15 NOTE — PROGRESS NOTES
Outpatient Sleep Medicine Consultation:      Name: Su Tinoco MRN# [id number]   Age: 69 year old YOB: 1955     Date of Consultation: September 15, 2024  Consultation is requested by: Carolyn Jasmine, ANDRÉS CNP  [address] [place] [address] Carolyn Jasmine  Primary care provider: Carolyn Jasmine       Reason for Sleep Consult:     Su Tinoco is sent by Carolyn Jasmine for a sleep consultation regarding insomnia.    Patient s Reason for visit  Su Tinoco main reason for visit: Waking at night  Patient states problem(s) started: past year  Su Tinoco's goals for this visit: possible further testing - father and sisters have apnea           Assessment and Plan:     Summary Sleep Diagnoses and Recommendations:  (F51.04) Chronic insomnia  (primary encounter diagnosis)  Comment: Su presents with symptoms of sleep maintenance insomnia, which has been an issue for most of her life.  She wakes in the middle of the night and sometimes starts thinking about work.  A couple of times per week she will be awake for 30-60 minutes.  She does not get out of bed.  She may be in bed 9:30 PM-10 PM to as late as 6 AM.  She has had some benefit with pushing her bedtime to 10:30 PM in the past.  She has been on 100 mg of trazodone, which she finds helpful.  She currently has a very stressful job, but will be retiring in January.  She also has been taking care of her 88-year-old mother.  She describes herself as a type A personality.    Plan: We talked about compressing her time in bed to about 10:30 PM to 6 AM, routinely.  We also discussed setting aside worry time earlier in the evening.  We discussed stimulus control recommendations as well.  She can continue on trazodone 100 mg.  We may reevaluate the need for that when she retires.    (R53.82) Chronic fatigue  Comment: Su has chronic fatigue over the last year.  That said, her ESS is 0/24.  She denies inadvertent dozing or napping.  She had concerns  "about sleep apnea contributing to her fatigue as she has 2 sisters and father with JENSEN.  They are all significantly overweight though.  Her risk for JENSEN is low.  Her STOP-BANG score is 2; tired and age> 50 (69).  She is not observed to snore or have pauses in breathing.  She does not have HTN.  Her BMI is 28.  She wanted to make sure she is not missing something as her father did not treat his apnea and ended up passing from CHF.  Plan: At this point, her risk does not indicate the need for further evaluation.  Without some report of snoring or irregular breathing, a sleep study may not be covered.  If she would like, she may consider purchasing a home oximeter.  I suggested either the GoWorkaBit oximeter or and Oura ring.  She was encouraged to send me screenshots of the recordings if she has questions about what the findings mean. You could also record snoring with the SnToodaluab huber.       Comorbid Diagnoses:  Patient Active Problem List   Diagnosis    Allergic rhinitis    Acquired hypothyroidism    Mild intermittent asthma    Collagenous colitis    Status post lumbar spinal fusion    Osteopenia, unspecified location    Anxiety    Diaphragmatic hernia        Summary Counseling:    Sleep Testing Reviewed  Obstructive Sleep Apnea Reviewed  Complications of Untreated Sleep Apnea Reviewed      Patient will follow up in the future as needed.  Bennett Goltz, PA-C      Total time spent reviewing medical records, history and physical examination, review of previous testing and interpretation as well as documentation on this date:59 min    CC: Carolyn Jasmine          History of Present Illness:     Su Tinoco presents with concerns of waking in the night for much of her life and a lot of fatigue for the last year or so.  She will be seeing endocrinology soon. She has also had a sensation that her feet were burning in the last 6 weeks. She will be seeing neurology soon. She describes herself as \"Type A\" and will think about " things she has to in the middle of the night.   Her work is demanding, which makes her tired. She also cares for her 88 year old mother. She may nap for 20 minutes if she woke at 3 AM, but generally denies napping. She denies inadvertent head nodding.   She is not observed to snore unless she has a glass of wine.   She has Symbicort, which she avoids taking at night.     She takes 100 mg trazodone at 9 PM and feels it helps. She thinks she would be awake about half the night without it. She started it around menopause (age 52).     Past Sleep Evaluations: None     SLEEP-WAKE SCHEDULE:     Work/School Days: Patient goes to school/work: Yes   Usually gets into bed at 930, trying to stay up until 10 PM.   Takes patient about 20 minutes to fall asleep  Has trouble falling asleep 3 nights per week  Wakes up in the middle of the night 2 times.  Wakes up due to External stimuli (bed partner, pets, noise, etc);Use the bathroom  She has trouble falling back asleep 2 times a week.   It usually takes 30-60 minutes to get back to sleep. She will lay there until she gets back to sleep.   Patient is usually up at 5-6am  Uses alarm: No    Weekends/Non-work Days/All Other Days:  Usually gets into bed at 930pm   Takes patient about 20 minutes to fall asleep  Patient is usually up at 5-6am  Uses alarm: No    Sleep Need  Patient gets  7-9 hours sleep on average   Patient thinks she needs about 8-9 hours sleep    Su Tinoco prefers to sleep in this position(s): Side;Stomach   Patient states they do the following activities in bed:      Naps  Patient takes a purposeful nap none times a week and naps are usually   in duration  She feels better after a nap:    She dozes off unintentionally 0 days per week  Patient has had a driving accident or near-miss due to sleepiness/drowsiness: No      SLEEP DISRUPTIONS:    Breathing/Snoring  Patient snores:No  Other people complain about her snoring: No  Patient has been told she stops  breathing in her sleep:No  She has issues with the following:  . Morning headaches: none. Nocturnal heartburn or reflux: none. Nasal congestion at night: none. She uses Biotene mouth wash and will get dry mouth without it (because of her mouth guard).    Movement:  Patient gets pain, discomfort, with an urge to move:  No restless legs symptoms, recently had burning in her feet.  It happens when she is resting:  No  It happens more at night:  No  Patient has been told she kicks her legs at night:  No     Behaviors in Sleep:  Su Tinoco has experienced the following behaviors while sleeping: Teeth grinding- has bite guard  Pt denies bruxism, sleep talking, sleep walking, and dream enactment behavior. Pt denies sleep paralysis, hypnagogue and cataplexy.       Is there anything else you would like your sleep provider to know:        CAFFEINE AND OTHER SUBSTANCES:    Patient consumes caffeinated beverages per day:  coffee 3 cups 1 cola  Last caffeine use is usually: early am and early pm  List of any prescribed or over the counter stimulants that patient takes:   phentermine has only taken about 10 pills. It makes her sleep worse.  List of any prescribed or over the counter sleep medication patient takes: Melatonin 5 mg, trazodone 100 mg   List of previous sleep medications that patient has tried:  none  Patient drinks alcohol to help them sleep: No  Patient drinks alcohol near bedtime: No. May have 1 glass but finds it wakes her.    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 She is also taking care of her 88 year old mother.   She gets a lot of exercise.   She lives with her  and dogs.     SCALES:    EPWORTH SLEEPINESS SCALE         9/11/2024    12:39 PM    [place] Sleepiness Scale ( ULISSES Hoffman  [id number]<br>ESS - [place]/English -  Final version - 21 Nov 07 - [facility] [place].)   Sitting and reading Would never doze   Watching TV Would never doze   Sitting, inactive in a public place (e.g. a theatre or a meeting) Would never doze   As a passenger in a car for an hour without a break Would never doze   Lying down to rest in the afternoon when circumstances permit Would never doze   Sitting and talking to someone Would never doze   Sitting quietly after a lunch without alcohol Would never doze   In a car, while stopped for a few minutes in traffic Would never doze   [place] Score (MC) 0   [place] Score (Sleep) 0         INSOMNIA SEVERITY INDEX (MIGUELINA)          9/11/2024    12:29 PM   Insomnia Severity Index (MIGUELINA)   Difficulty falling asleep 1   Difficulty staying asleep 1   Problems waking up too early 1   How SATISFIED/DISSATISFIED are you with your CURRENT sleep pattern? 2   How NOTICEABLE to others do you think your sleep problem is in terms of impairing the quality of your life? 1   How WORRIED/DISTRESSED are you about your current sleep problem? 1   To what extent do you consider your sleep problem to INTERFERE with your daily functioning (e.g. daytime fatigue, mood, ability to function at work/daily chores, concentration, memory, mood, etc.) CURRENTLY? 1   MIGUELINA Total Score 8       Guidelines for Scoring/Interpretation:  Total score categories:  0-7 = No clinically significant insomnia   8-14 = Subthreshold insomnia   15-21 = Clinical insomnia (moderate severity)  22-28 = Clinical insomnia (severe)  Used via courtesy of www.[employer].va.gov with permission from Ortiz Grant PhD., [facility]      STOP BANG         9/11/2024    12:40 PM   STOP BANG Questionnaire (  2008, the American Society of Anesthesiologists, Inc. [employer]   1. Snoring - Do you snore loudly (louder than talking or loud enough to be heard through closed doors)? No   2. Tired - Do you often feel tired, fatigued, or sleepy during  "daytime? Yes   3. Observed - Has anyone observed you stop breathing during your sleep? No   4. Blood pressure - Do you have or are you being treated for high blood pressure? No   5. BMI - BMI more than 35 kg/m2? No   6. Age - Age over 50 yr old? Yes   7. Neck circumference - Neck circumference greater than 40 cm? No   8. Gender - Gender male? No   STOP BANG Score (MC): 3 (High risk of JENSEN)         GAD7        5/2/2024     9:04 AM   BRONWYN-7    1. Feeling nervous, anxious, or on edge 1   2. Not being able to stop or control worrying 0   3. Worrying too much about different things 0   4. Trouble relaxing 0   5. Being so restless that it is hard to sit still 0   6. Becoming easily annoyed or irritable 0   7. Feeling afraid, as if something awful might happen 0   BRONWYN-7 Total Score 1   If you checked any problems, how difficult have they made it for you to do your work, take care of things at home, or get along with other people? Not difficult at all         CAGE-AID         No data to display                CAGE-AID reprinted with permission from the [place] Medical Journal, OCTAVIO Castillo. and KERI Ayala, \"Conjoint screening questionnaires for alcohol and drug abuse\" [place] Medical Journal 94: 135-140, 1995.      PATIENT HEALTH QUESTIONNAIRE-9 (PHQ - 9)        5/1/2024    11:35 AM   PHQ-9 ([employer])   1.  Little interest or pleasure in doing things 0   2.  Feeling down, depressed, or hopeless 0   3.  Trouble falling or staying asleep, or sleeping too much 0   4.  Feeling tired or having little energy 2   5.  Poor appetite or overeating 1   6.  Feeling bad about yourself - or that you are a failure or have let yourself or your family down 0   7.  Trouble concentrating on things, such as reading the newspaper or watching television 0   8.  Moving or speaking so slowly that other people could have noticed. Or the opposite - being so fidgety or restless that you have been moving around a lot more than usual 0   9.  Thoughts " that you would be better off dead, or of hurting yourself in some way 0   PHQ-9 Total Score 3   6.  Feeling bad about yourself 0   7.  Trouble concentrating 0   8.  Moving slowly or restless 0   9.  Suicidal or self-harm thoughts 0   1.  Little interest or pleasure in doing things Not at all   2.  Feeling down, depressed, or hopeless Not at all   3.  Trouble falling or staying asleep, or sleeping too much Not at all   4.  Feeling tired or having little energy More than half the days   5.  Poor appetite or overeating Several days   6.  Feeling bad about yourself Not at all   7.  Trouble concentrating Not at all   8.  Moving slowly or restless Not at all   9.  Suicidal or self-harm thoughts Not at all   PHQ-9 via [employer] TOTAL SCORE-----> 3 (Minimal depression)   Difficulty at work, home, or with people Not difficult at all       Developed by Lala Holm, Camilla Morgan, Flo José and colleagues, with an educational ana from [employer]. No permission required to reproduce, translate, display or distribute.        Allergies:    Allergies   Allergen Reactions    Corylus Anaphylaxis    Nuts Anaphylaxis     peanuts, nara nuts    Peanut-Containing Drug Products Anaphylaxis    Compazine      Lock jaw, vision problems and shakes    Erythromycin GI Disturbance    Prochlorperazine      Jaw spasm and vision loss       Medications:    Current Outpatient Medications   Medication Sig Dispense Refill    albuterol (VENTOLIN HFA) 108 (90 Base) MCG/ACT inhaler INHALE 1 TO 2 PUFFS INTO THE LUNGS EVERY 6 HOURS AS NEEDED 18 g 3    Biotin w/ Vitamins C & E 1250-7.5-7.5 MCG-MG-UNT CHEW Take 2 chew tab by mouth daily      Calcium Citrate-Vitamin D (CITRACAL MAXIMUM PO) Take 1,200 Units by mouth 2 times daily      EPINEPHrine (ANY BX GENERIC EQUIV) 0.3 MG/0.3ML injection 2-pack Inject 0.3 mLs (0.3 mg) into the muscle once as needed for anaphylaxis 0.6 mL 11    fluticasone (FLONASE) 50 MCG/ACT nasal spray Spray 1 spray  into both nostrils daily      levocetirizine (XYZAL) 5 MG tablet Take 5 mg by mouth every morning      levothyroxine (SYNTHROID/LEVOTHROID) 88 MCG tablet Take 1 tablet (88 mcg) by mouth daily 90 tablet 3    melatonin 5 MG tablet Take 10 mg by mouth At Bedtime (5MG X 2 = 10MG)      montelukast (SINGULAIR) 10 MG tablet Take 1 tablet (10 mg) by mouth at bedtime 90 tablet 3    Multiple Vitamins-Minerals (WOMENS MULTI VITAMIN & MINERAL) TABS Take 1 tablet by mouth daily      phentermine (ADIPEX-P) 37.5 MG tablet Take 1 tablet (37.5 mg) by mouth every morning (before breakfast) 90 tablet 0    Probiotic Product (DAILY PROBIOTIC) CAPS Take 1 capsule by mouth daily      traZODone (DESYREL) 100 MG tablet Take 1 tablet (100 mg) by mouth at bedtime 90 tablet 3       Problem List:  Patient Active Problem List    Diagnosis Date Noted    CARDIOVASCULAR SCREENING; LDL GOAL LESS THAN 130 11/16/2021     Priority: Medium    Diaphragmatic hernia 02/18/2021     Priority: Medium    Anxiety 03/02/2020     Priority: Medium    Osteopenia, unspecified location 02/07/2020     Priority: Medium    Status post lumbar spinal fusion 09/26/2019     Priority: Medium    Collagenous colitis 12/05/2018     Priority: Medium    History of colonic polyps 11/29/2018     Priority: Medium    Benign neoplasm of transverse colon 11/14/2017     Priority: Medium    Mild intermittent asthma      Priority: Medium    Allergic rhinitis 07/21/2003     Priority: Medium     Problem list name updated by automated process. Provider to review      Acquired hypothyroidism 07/21/2003     Priority: Medium     Problem list name updated by automated process. Provider to review          Past Medical/Surgical History:  Past Medical History:   Diagnosis Date    Allergic rhinitis, cause unspecified     Anaphylactic reaction due to tree nuts and seeds     Arthritis     Colitis 11/2018    Mild intermittent asthma     sees allergist    Other specified disorders of thyroid     Spinal  stenosis of lumbar region     L5, S1    Spondylolisthesis at L5-S1 level     Sprain of unspecified site of back     Trochanteric bursitis of right hip     Unspecified hypothyroidism      Past Surgical History:   Procedure Laterality Date    BREAST SURGERY      breast reduction    C/SECTION, LOW TRANSVERSE      , Low Transverse    COLONOSCOPY      EYE SURGERY      blepharoplasty    LAMINECTOMY, FUSION LUMBAR ONE LEVEL, COMBINED N/A 2019    Procedure: L4-S1 LAMINECTOMY, L5-S1 POSTERIOR SPINAL FUSION;  Surgeon: Wily Howell MD;  Location: [facility] OR    TONSILLECTOMY & ADENOIDECTOMY      TUBAL LIGATION      [facility] NONSPECIFIC PROCEDURE      Laparoscopies IUD infection    Z NONSPECIFIC PROCEDURE      removal of myxoid tumors fingers    Z NONSPECIFIC PROCEDURE      sphincterotomy of anus       Social History:  Social History     Socioeconomic History    Marital status:      Spouse name: Not on file    Number of children: 2    Years of education: Not on file    Highest education level: Not on file   Occupational History     Employer: [employer]   Tobacco Use    Smoking status: Never     Passive exposure: Never    Smokeless tobacco: Never    Tobacco comments:     second hand smoke by mother during childhood   Vaping Use    Vaping status: Never Used   Substance and Sexual Activity    Alcohol use: Yes     Comment: twice  weekly    Drug use: No    Sexual activity: Yes     Partners: Male     Birth control/protection: Post-menopausal, Female Surgical   Other Topics Concern     Service Not Asked    Blood Transfusions Not Asked    Caffeine Concern Not Asked    Occupational Exposure Not Asked    Hobby Hazards Not Asked    Sleep Concern Not Asked    Stress Concern Not Asked    Weight Concern Not Asked    Special Diet Not Asked    Back Care Not Asked    Exercise Yes    Bike Helmet No    Seat Belt Yes    Self-Exams Yes    Parent/sibling w/ CABG, MI or angioplasty before 65F  55M? Not Asked   Social History Narrative    Not on file     Social Determinants of Health     Financial Resource Strain: Low Risk  (5/1/2024)    Financial Resource Strain     Within the past 12 months, have you or your family members you live with been unable to get utilities (heat, electricity) when it was really needed?: No   Food Insecurity: Low Risk  (5/1/2024)    Food Insecurity     Within the past 12 months, did you worry that your food would run out before you got money to buy more?: No     Within the past 12 months, did the food you bought just not last and you didn t have money to get more?: No   Transportation Needs: Low Risk  (5/1/2024)    Transportation Needs     Within the past 12 months, has lack of transportation kept you from medical appointments, getting your medicines, non-medical meetings or appointments, work, or from getting things that you need?: No   Physical Activity: Sufficiently Active (5/1/2024)    Exercise Vital Sign     Days of Exercise per Week: 4 days     Minutes of Exercise per Session: 40 min   Stress: Stress Concern Present (5/1/2024)    [place] [employer] - Occupational Stress Questionnaire     Feeling of Stress : To some extent   Social Connections: Unknown (5/1/2024)    Social Connection and Isolation Panel [NHANES]     Frequency of Communication with Friends and Family: Not on file     Frequency of Social Gatherings with Friends and Family: More than three times a week     Attends Yarsani Services: Not on file     Active Member of Clubs or Organizations: Not on file     Attends Club or Organization Meetings: Not on file     Marital Status: Not on file   Interpersonal Safety: Not on file   Housing Stability: Low Risk  (5/1/2024)    Housing Stability     Do you have housing? : Yes     Are you worried about losing your housing?: No   Recent Concern: Housing Stability - High Risk (4/25/2024)    Housing Stability     Do you have housing? : No     Are you  "worried about losing your housing?: No       Family History:  Family History   Problem Relation Age of Onset    Eye Disorder Mother         iritis and glaucoma    Thyroid Disease Mother     Anxiety Disorder Mother     Osteoporosis Mother     Diabetes Father     Hypertension Father     Cancer Father         Melanoma    Cardiovascular Father         CHF    Diabetes Sister         type 2    Asthma Sister     Diabetes Sister 50        type 2 diabetes    Asthma Sister     Breast Cancer Sister         38    Heart Disease Maternal Grandmother         cardiomegaly       Review of Systems:  A complete review of systems reviewed by me is negative with the exeption of what has been mentioned in the history of present illness.  In the last TWO WEEKS have you experienced any of the following symptoms?  Fevers: No  Night Sweats: Yes  Weight Gain: Yes  Pain at Night: Yes  Double Vision: No  Changes in Vision: No  Difficulty Breathing through Nose: No  Sore Throat in Morning: No  Dry Mouth in the Morning: Yes  Shortness of Breath Lying Flat: No  Shortness of Breath With Activity: No  Awakening with Shortness of Breath: No  Increased Cough: No  Heart Racing at Night: No  Swelling in Feet or Legs: No  Diarrhea at Night: No  Heartburn at Night: No  Urinating More than Once at Night: No  Losing Control of Urine at Night: No  Joint Pains at Night: Yes  Headaches in Morning: No  Weakness in Arms or Legs: No  Depressed Mood: No  Anxiety: No     Physical Examination:  Vitals: Ht 1.575 m (5' 2\")   Wt 71.2 kg (157 lb)   LMP  (LMP Unknown)   BMI 28.72 kg/m               GENERAL APPEARANCE: healthy, alert, no distress, and cooperative  EYES: Eyes grossly normal to inspection  HENT: oral mucous membranes moist and oropharynx clear  NECK: no asymmetry, masses, or scars  RESP: 59 min  Mallampati Class: II.  Tonsillar Stage: 0  surgically removed.         Data: All pertinent previous laboratory data reviewed     Recent Labs   Lab Test " "05/02/24  1004 09/28/23  0817    142   POTASSIUM 4.1 4.1   CHLORIDE 102 103   CO2 29 28   ANIONGAP 10 11   GLC 97 93   BUN 17.8 18.2   CR 0.75 0.84   ELIGIO 9.7 9.7       Recent Labs   Lab Test 05/02/24  1004   WBC 6.2   RBC 4.36   HGB 13.2   HCT 40.3   MCV 92   MCH 30.3   MCHC 32.8   RDW 12.9          Recent Labs   Lab Test 09/28/23  0817   PROTTOTAL 6.9   ALBUMIN 4.4   BILITOTAL 0.4   ALKPHOS 57   AST 23   ALT 20       TSH   Date Value   05/02/2024 1.86 uIU/mL   09/28/2023 1.25 uIU/mL   10/04/2022 1.08 mU/L   11/16/2021 2.00 mU/L   09/10/2020 1.31 mU/L   05/24/2019 0.60 mU/L       No results found for: \"UAMP\", \"UBARB\", \"BENZODIAZEUR\", \"UCANN\", \"UCOC\", \"OPIT\", \"UPCP\"    No results found for: \"IRONSAT\", \"PQ63304\", \"LEO\"    No results found for: \"PH\", \"PHARTERIAL\", \"PO2\", \"IZ1GRRGRJOO\", \"SAT\", \"PCO2\", \"HCO3\", \"BASEEXCESS\", \"VALERY\", \"BEB\"    @LABRCNTIPR(phv:4,pco2v:4,po2v:4,hco3v:4,carol:4,o2per:4)@    Echocardiology: No results found for this or any previous visit (from the past 4320 hour(s)).    Chest x-ray: No results found for this or any previous visit from the past 365 days.      Chest CT: No results found for this or any previous visit from the past 365 days.      PFT: Most Recent [facility] Pulmonary Function Testing    No results found for: \"20001\"        Bennett Ezra Goltz, PA-C, MARCIAL 9/15/2024          "

## 2024-09-16 ENCOUNTER — VIRTUAL VISIT (OUTPATIENT)
Dept: SLEEP MEDICINE | Facility: CLINIC | Age: 69
End: 2024-09-16
Payer: COMMERCIAL

## 2024-09-16 VITALS — BODY MASS INDEX: 28.89 KG/M2 | HEIGHT: 62 IN | WEIGHT: 157 LBS

## 2024-09-16 DIAGNOSIS — R53.82 CHRONIC FATIGUE: ICD-10-CM

## 2024-09-16 DIAGNOSIS — F51.04 CHRONIC INSOMNIA: Primary | ICD-10-CM

## 2024-09-16 PROCEDURE — 99204 OFFICE O/P NEW MOD 45 MIN: CPT | Mod: 95 | Performed by: PHYSICIAN ASSISTANT

## 2024-09-16 NOTE — PROGRESS NOTES
Virtual Visit Details    Type of service:  Video Visit   Start Time: 1:56 PM   End Time: 2:41 PM    Originating Location (pt. Location): Home    Distant Location (provider location):  Off-site  Platform used for Video Visit: Doroteo

## 2024-09-16 NOTE — TELEPHONE ENCOUNTER
All lab orders are signed. FYI- She did just have routine labs done in May but we can repeat these given her new symptoms.    Regarding the EMG-I do not order those tests but neurology will decide if she needs one ordered when she sees them :)

## 2024-09-16 NOTE — PATIENT INSTRUCTIONS
Consider the Wellue oximeter, available over the internet.  Alternatively, you could consider the Oura ring, but those tend to be more expensive.  Apnea events have to be at least 10 seconds long and cause at least a 4% drop in oxygen.  The results of an oximeter study usually report an JACK or oxygen desaturation index.  This is the count of how many times per hour you are having a drop in oxygen meeting those criteria.  You can have up to 5/h and still be considered in the normal range.  If you have questions about what the recording mean, you can send me screenshots of the recordings over WeDuc.    You could also use the Kuona huber to record you snoring/breathing noises. If you identify significant snoring, let me know and we can consider a sleep study. Sleep in a room by yourself for a few of those recordings to make sure the noises recorded are from you.    General recommendations for sleep problems (Insomnia)  Allow 2-4 weeks to see results     Establish a regular sleep schedule    Most people only need 7-8 hours of sleep.  Don't be in bed longer than you need     to sleep or you will end up spending more time awake in bed. This trains your    brain to think of the bed as a place to not sleep.  I would suggest compressing her time in bed to 10:30 PM to 6 AM more routinely.    Go to bed at same time each night   Get up at same time each day - Set an alarm everyday (even weekends). This is one of    the most important tips. It prevents you from relying on your insomnia to get you    up on time for your day. That actually reinforces insomnia. It also will help your    body get into a pattern where you start feeling tired at a consistent time each    night.  The body functions best when you keep a consistent routine.  Avoid sleeping-in and napping. Anytime you sleep during the day, you will be less tired at    night. You may be tired enough to fall asleep, but you will wake more in the    middle of the night  because you will have met your sleep need before the night is    done.   Cut down time in bed (if not asleep, get up)- Use your bed only for sleep and sex    Anytime you spend time in bed doing activities other than sleep (reading,    watching TV, working, playing on the computer or phone, or even just laying in    bed trying to sleep), you are training  your brain to think of the bed as a place to    do activities other than sleep. If you are not falling asleep within 20-30 minutes,    get out of bed. While out of bed, avoid bright lights. Avoid work or chores. Being    productive in the middle of the night reinforces waking up at night. Find relaxing,    not particularly entertaining activities like reading, listening to music, or relaxation    exercises. Go back to bed if you start feeling groggy, or after about 30 minutes,    even if not feeling very tired. Sometimes, just getting out of bed stops the pattern    of getting frustrated about laying in bed not sleeping, and that can help you fall    asleep.   Avoid trying to force yourself to sleep- sleep is not like everything else. The harder you    work at most things, the more you can accomplish. The harder you work at    sleep, the less you will sleep.     Make the bedroom comfortable - quiet, dark and cool are better. Consider ear plugs    (silicon). Use dark blinds or wear an eye mask if needed     Make a relaxing routine prior to bedtime  Relaxation exercises:   Progressive muscle relaxation: Relax each muscle group individually    Begin with your feet, flex, then relax. Try to imagine your feet feeling heavy and sinking into the bed. Move to your calves, do the same thing. Work through each muscle group toward your head.    Relaxing Mental Imagery: Try to imagine a trip that you took and found relaxing, or imagine a day at the beach. Try to walk yourself through the day in your mind as if you were dreaming it. Try to imagine sensing the different  experiences, such as feeling sand between your toes, the heat of the sun on your skin, seeing the waves crashing the shore, the smell of the salt water, etc.     Deal with your worries before bedtime    Set aside a worry time around dinner time for 10-15 minutes. Write down the    things that are on your mind. Plan time in the coming days to address those    issues. Brainstorm ideas on how you will deal with them. Try to identify issues    that are out of your control, and try to let those issues go.  Listen to relaxation tapes   Classical Music or Nature sounds   Back Massage   Get regular exercise each day (at least 1-2 hours before bedtime)   Take medications only as directed   Eat a light bedtime snack or warm drink   Warm milk   Warm herbal tea (non-caffeinated)       Things to avoid   No overstimulating activities just before bed   No competitive games before bedtime   No exciting television programs before bedtime   Avoid caffeine after lunchtime   Avoid chocolate   Do not use alcohol to induce sleep (worsens Insomnia)   Do not take someone else's sleeping pills   Do not look at the clock when awakening   Do not turn on light when getting up to use bathroom, use a nightlight     Online Programs   www.ONtheAIR (pronounced shut eye). There is a fee for this program. Enter the code  [place]  if you decide to enroll in this program.    www.sleepIO.com (pronounced sleep ee oh). There is a fee for this program. Enter the code  [place]  if you decide to enroll in this program.     Suggested Resources  Insomnia Treatment Books   Overcoming Insomnia by Kwan Salazar and Lissette Ross (2008)  No More Sleepless Nights by Norris Langley and Sugar Winston (1996)  Say Tung to Insomnia by Tank Marquez (2009)  The Insomnia Workbook by Betzaida Carroll and Ortiz Grant (2009)  The Insomnia Answer by Valerio Grissom and Byron Sherman (2006)      Stress Management and Relaxation Books  The Relaxation and Stress  Reduction Workbook by Karina Correa and Tre Ruiz (2008)  Stress Management Workbook: Techniques and Self-Assessment Procedures by Andie Ramos and Denys Jesus (1997)  A Mindfulness-Based Stress Reduction Workbook by Armni Sanchez and Macey Trevino (2010)  The Complete Stress Management Workbook by Migel Barnard, Humberto Ford and Paul Mojica (1996)  Assert Yourself by Kinsey Vazquez and Hipolito Vazquez (1977)    Relaxation Resources for Computer Download   These websites offer resources to help you relax. This list is for information only. [facility] is not responsible for the quality of services or the actions of any person or organization.  Progressive Muscle Relaxation (PMR):   http://www.Langtice/progressive-muscle-relaxation-exercise.html   http://studentsupport.[facility]/counseling/resources/self-help/relaxation-and-stress-management/   Deep Breathing Exercises:  http://www.Langtice/breathing-awareness.html     Meditation:   www.Parascale  www.DemohourguidedNaonextmeditation-site.com You may have to pay for some of these resources.    Guided Imagery:  http://www.Langtice/guided-imagery-scripts.html   http://eziCONEX/library/vcmfhqgdht-qcfths-jpfiipq/   Consider phone apps such as: Calm, Headspace or Insight Timer.    Counseling / Behavioral Health  [facility]  Visit [email or web address] or call [phone] to find a clinic close to you.  Or call [phone] for [facility].

## 2024-09-16 NOTE — NURSING NOTE
[facility] [address] [facility] [address]    Is the patient currently in the state of [place]? YES    Visit mode:VIDEO    If the visit is dropped, the patient can be reconnected by: VIDEO VISIT: Send to e-mail at: [email or web address].Network Hardware Resale    Will anyone else be joining the visit? NO  (If patient encounters technical issues they should call [phone]    How would you like to obtain your AVS? MyChart    Are changes needed to the allergy or medication list? No    Are refills needed on medications prescribed by this physician? NO    Rooming Documentation:  Questionnaire(s) completed      Reason for visit: Consult    Raz DILLON

## 2024-09-17 ENCOUNTER — TRANSFERRED RECORDS (OUTPATIENT)
Dept: HEALTH INFORMATION MANAGEMENT | Facility: CLINIC | Age: 69
End: 2024-09-17
Payer: COMMERCIAL

## 2024-09-17 DIAGNOSIS — E61.7 DEFICIENCY OF MULTIPLE NUTRIENT ELEMENTS: Primary | ICD-10-CM

## 2024-09-18 ENCOUNTER — TRANSFERRED RECORDS (OUTPATIENT)
Dept: HEALTH INFORMATION MANAGEMENT | Facility: CLINIC | Age: 69
End: 2024-09-18

## 2024-09-19 ENCOUNTER — LAB (OUTPATIENT)
Dept: LAB | Facility: CLINIC | Age: 69
End: 2024-09-19
Payer: COMMERCIAL

## 2024-09-19 DIAGNOSIS — Z13.220 SCREENING FOR HYPERLIPIDEMIA: ICD-10-CM

## 2024-09-19 DIAGNOSIS — E55.9 VITAMIN D DEFICIENCY: ICD-10-CM

## 2024-09-19 DIAGNOSIS — Z13.1 SCREENING FOR DIABETES MELLITUS: ICD-10-CM

## 2024-09-19 DIAGNOSIS — E61.7 DEFICIENCY OF MULTIPLE NUTRIENT ELEMENTS: ICD-10-CM

## 2024-09-19 DIAGNOSIS — E03.9 ACQUIRED HYPOTHYROIDISM: ICD-10-CM

## 2024-09-19 DIAGNOSIS — M81.0 OSTEOPOROSIS: Primary | ICD-10-CM

## 2024-09-19 DIAGNOSIS — E03.9 HYPOTHYROIDISM: ICD-10-CM

## 2024-09-19 DIAGNOSIS — G62.9 NEUROPATHY: ICD-10-CM

## 2024-09-19 LAB
ANION GAP SERPL CALCULATED.3IONS-SCNC: 12 MMOL/L (ref 7–15)
BUN SERPL-MCNC: 16.4 MG/DL (ref 8–23)
CALCIUM SERPL-MCNC: 9.3 MG/DL (ref 8.8–10.4)
CHLORIDE SERPL-SCNC: 101 MMOL/L (ref 98–107)
CHOLEST SERPL-MCNC: 206 MG/DL
CREAT SERPL-MCNC: 0.72 MG/DL (ref 0.51–0.95)
EGFRCR SERPLBLD CKD-EPI 2021: >90 ML/MIN/1.73M2
ERYTHROCYTE [DISTWIDTH] IN BLOOD BY AUTOMATED COUNT: 13.1 % (ref 10–15)
FASTING STATUS PATIENT QL REPORTED: NO
FASTING STATUS PATIENT QL REPORTED: NO
FOLATE SERPL-MCNC: >40 NG/ML (ref 4.6–34.8)
GLUCOSE SERPL-MCNC: 122 MG/DL (ref 70–99)
HCO3 SERPL-SCNC: 27 MMOL/L (ref 22–29)
HCT VFR BLD AUTO: 38.4 % (ref 35–47)
HDLC SERPL-MCNC: 88 MG/DL
HGB BLD-MCNC: 12.7 G/DL (ref 11.7–15.7)
LDLC SERPL CALC-MCNC: 92 MG/DL
Lab: NORMAL
MCH RBC QN AUTO: 30.7 PG (ref 26.5–33)
MCHC RBC AUTO-ENTMCNC: 33.1 G/DL (ref 31.5–36.5)
MCV RBC AUTO: 93 FL (ref 78–100)
NONHDLC SERPL-MCNC: 118 MG/DL
PERFORMING LABORATORY: NORMAL
PLATELET # BLD AUTO: 279 10E3/UL (ref 150–450)
POTASSIUM SERPL-SCNC: 3.5 MMOL/L (ref 3.4–5.3)
PTH-INTACT SERPL-MCNC: 43 PG/ML (ref 15–65)
RBC # BLD AUTO: 4.14 10E6/UL (ref 3.8–5.2)
SODIUM SERPL-SCNC: 140 MMOL/L (ref 135–145)
SPECIMEN STATUS: NORMAL
T4 FREE SERPL-MCNC: 1.43 NG/DL (ref 0.9–1.7)
TEST NAME: NORMAL
TOTAL PROTEIN SERUM FOR ELP: 7 G/DL (ref 6.4–8.3)
TRIGL SERPL-MCNC: 131 MG/DL
TSH SERPL DL<=0.005 MIU/L-ACNC: 1.02 UIU/ML (ref 0.3–4.2)
VIT B12 SERPL-MCNC: 927 PG/ML (ref 232–1245)
VIT D+METAB SERPL-MCNC: 72 NG/ML (ref 20–50)
WBC # BLD AUTO: 5 10E3/UL (ref 4–11)

## 2024-09-19 PROCEDURE — 82523 COLLAGEN CROSSLINKS: CPT | Mod: 90

## 2024-09-19 PROCEDURE — 82306 VITAMIN D 25 HYDROXY: CPT

## 2024-09-19 PROCEDURE — 80061 LIPID PANEL: CPT

## 2024-09-19 PROCEDURE — 99000 SPECIMEN HANDLING OFFICE-LAB: CPT

## 2024-09-19 PROCEDURE — 85027 COMPLETE CBC AUTOMATED: CPT

## 2024-09-19 PROCEDURE — 84443 ASSAY THYROID STIM HORMONE: CPT

## 2024-09-19 PROCEDURE — 86334 IMMUNOFIX E-PHORESIS SERUM: CPT | Performed by: PATHOLOGY

## 2024-09-19 PROCEDURE — 84165 PROTEIN E-PHORESIS SERUM: CPT | Performed by: PATHOLOGY

## 2024-09-19 PROCEDURE — 83036 HEMOGLOBIN GLYCOSYLATED A1C: CPT

## 2024-09-19 PROCEDURE — 86235 NUCLEAR ANTIGEN ANTIBODY: CPT | Mod: 90

## 2024-09-19 PROCEDURE — 84439 ASSAY OF FREE THYROXINE: CPT

## 2024-09-19 PROCEDURE — 84630 ASSAY OF ZINC: CPT | Mod: 90

## 2024-09-19 PROCEDURE — 36415 COLL VENOUS BLD VENIPUNCTURE: CPT

## 2024-09-19 PROCEDURE — 82746 ASSAY OF FOLIC ACID SERUM: CPT

## 2024-09-19 PROCEDURE — 82607 VITAMIN B-12: CPT

## 2024-09-19 PROCEDURE — 83970 ASSAY OF PARATHORMONE: CPT

## 2024-09-19 PROCEDURE — 80048 BASIC METABOLIC PNL TOTAL CA: CPT

## 2024-09-19 PROCEDURE — 83921 ORGANIC ACID SINGLE QUANT: CPT

## 2024-09-19 PROCEDURE — 86235 NUCLEAR ANTIGEN ANTIBODY: CPT

## 2024-09-19 PROCEDURE — 86225 DNA ANTIBODY NATIVE: CPT

## 2024-09-19 PROCEDURE — 84155 ASSAY OF PROTEIN SERUM: CPT

## 2024-09-19 PROCEDURE — 82525 ASSAY OF COPPER: CPT | Mod: 90

## 2024-09-20 DIAGNOSIS — R73.09 ELEVATED GLUCOSE: Primary | ICD-10-CM

## 2024-09-20 LAB
ALBUMIN SERPL ELPH-MCNC: 4.5 G/DL (ref 3.7–5.1)
ALPHA1 GLOB SERPL ELPH-MCNC: 0.3 G/DL (ref 0.2–0.4)
ALPHA2 GLOB SERPL ELPH-MCNC: 0.7 G/DL (ref 0.5–0.9)
B-GLOBULIN SERPL ELPH-MCNC: 0.8 G/DL (ref 0.6–1)
CENTROMERE B AB SER-ACNC: <0.7 U/ML
CENTROMERE B AB SER-ACNC: NEGATIVE
DSDNA AB SER-ACNC: 0.9 IU/ML
ENA JO1 AB SER IA-ACNC: <0.5 U/ML
ENA JO1 IGG SER-ACNC: NEGATIVE
ENA SCL70 IGG SER IA-ACNC: <0.6 U/ML
ENA SCL70 IGG SER IA-ACNC: NEGATIVE
ENA SM IGG SER IA-ACNC: <0.7 U/ML
ENA SM IGG SER IA-ACNC: NEGATIVE
ENA SS-A AB SER IA-ACNC: <0.5 U/ML
ENA SS-A AB SER IA-ACNC: NEGATIVE
ENA SS-B IGG SER IA-ACNC: <0.6 U/ML
ENA SS-B IGG SER IA-ACNC: NEGATIVE
EST. AVERAGE GLUCOSE BLD GHB EST-MCNC: 123 MG/DL
GAMMA GLOB SERPL ELPH-MCNC: 0.7 G/DL (ref 0.7–1.6)
HBA1C MFR BLD: 5.9 % (ref 0–5.6)
LOCATION OF TASK: NORMAL
LOCATION OF TASK: NORMAL
M PROTEIN SERPL ELPH-MCNC: 0 G/DL
PROT PATTERN SERPL ELPH-IMP: NORMAL
PROT PATTERN SERPL IFE-IMP: NORMAL
U1 SNRNP IGG SER IA-ACNC: <1.1 U/ML
U1 SNRNP IGG SER IA-ACNC: NEGATIVE

## 2024-09-21 LAB
COLLAGEN CTX SERPL-MCNC: 97 PG/ML
MISCELLANEOUS TEST 1 (ARUP): NORMAL
MISCELLANEOUS TEST 1 (ARUP): NORMAL

## 2024-09-22 LAB
COPPER SERPL-MCNC: 88.2 UG/DL
MISCELLANEOUS TEST 1 (ARUP): NORMAL
ZINC SERPL-MCNC: 84.3 UG/DL

## 2024-09-25 LAB — METHYLMALONATE SERPL-SCNC: 0.17 UMOL/L (ref 0–0.4)

## 2024-10-03 ENCOUNTER — TRANSFERRED RECORDS (OUTPATIENT)
Dept: HEALTH INFORMATION MANAGEMENT | Facility: CLINIC | Age: 69
End: 2024-10-03
Payer: COMMERCIAL

## 2024-10-12 ENCOUNTER — HOSPITAL ENCOUNTER (EMERGENCY)
Facility: CLINIC | Age: 69
Discharge: HOME | End: 2024-10-12
Attending: EMERGENCY MEDICINE | Admitting: EMERGENCY MEDICINE
Payer: COMMERCIAL

## 2024-10-12 VITALS
DIASTOLIC BLOOD PRESSURE: 67 MMHG | SYSTOLIC BLOOD PRESSURE: 144 MMHG | RESPIRATION RATE: 16 BRPM | HEART RATE: 70 BPM | OXYGEN SATURATION: 95 %

## 2024-10-12 DIAGNOSIS — T78.2XXA ANAPHYLAXIS, INITIAL ENCOUNTER: ICD-10-CM

## 2024-10-12 DIAGNOSIS — J45.901 EXACERBATION OF ASTHMA, UNSPECIFIED ASTHMA SEVERITY, UNSPECIFIED WHETHER PERSISTENT: ICD-10-CM

## 2024-10-12 DIAGNOSIS — U07.1 COVID-19: ICD-10-CM

## 2024-10-12 PROCEDURE — 96375 TX/PRO/DX INJ NEW DRUG ADDON: CPT

## 2024-10-12 PROCEDURE — 250N000011 HC RX IP 250 OP 636: Performed by: EMERGENCY MEDICINE

## 2024-10-12 PROCEDURE — 99284 EMERGENCY DEPT VISIT MOD MDM: CPT | Mod: 25

## 2024-10-12 PROCEDURE — 96376 TX/PRO/DX INJ SAME DRUG ADON: CPT

## 2024-10-12 PROCEDURE — 96374 THER/PROPH/DIAG INJ IV PUSH: CPT

## 2024-10-12 PROCEDURE — 250N000013 HC RX MED GY IP 250 OP 250 PS 637: Performed by: EMERGENCY MEDICINE

## 2024-10-12 RX ORDER — MAGNESIUM HYDROXIDE/ALUMINUM HYDROXICE/SIMETHICONE 120; 1200; 1200 MG/30ML; MG/30ML; MG/30ML
15 SUSPENSION ORAL ONCE
Status: COMPLETED | OUTPATIENT
Start: 2024-10-12 | End: 2024-10-12

## 2024-10-12 RX ORDER — DIPHENHYDRAMINE HYDROCHLORIDE 50 MG/ML
25 INJECTION INTRAMUSCULAR; INTRAVENOUS ONCE
Status: COMPLETED | OUTPATIENT
Start: 2024-10-12 | End: 2024-10-12

## 2024-10-12 RX ORDER — METHYLPREDNISOLONE SODIUM SUCCINATE 125 MG/2ML
125 INJECTION INTRAMUSCULAR; INTRAVENOUS ONCE
Status: COMPLETED | OUTPATIENT
Start: 2024-10-12 | End: 2024-10-12

## 2024-10-12 RX ORDER — PREDNISONE 20 MG/1
40 TABLET ORAL DAILY
Qty: 8 TABLET | Refills: 0 | Status: SHIPPED | OUTPATIENT
Start: 2024-10-12 | End: 2024-10-16

## 2024-10-12 RX ORDER — EPINEPHRINE 0.3 MG/.3ML
0.3 INJECTION SUBCUTANEOUS
Qty: 1 EACH | Refills: 1 | Status: SHIPPED | OUTPATIENT
Start: 2024-10-12

## 2024-10-12 RX ADMIN — FAMOTIDINE 20 MG: 10 INJECTION, SOLUTION INTRAVENOUS at 14:22

## 2024-10-12 RX ADMIN — METHYLPREDNISOLONE SODIUM SUCCINATE 125 MG: 125 INJECTION, POWDER, FOR SOLUTION INTRAMUSCULAR; INTRAVENOUS at 14:23

## 2024-10-12 RX ADMIN — DIPHENHYDRAMINE HYDROCHLORIDE 25 MG: 50 INJECTION, SOLUTION INTRAMUSCULAR; INTRAVENOUS at 14:23

## 2024-10-12 RX ADMIN — DIPHENHYDRAMINE HYDROCHLORIDE 25 MG: 50 INJECTION, SOLUTION INTRAMUSCULAR; INTRAVENOUS at 16:06

## 2024-10-12 RX ADMIN — ALUMINUM HYDROXIDE, MAGNESIUM HYDROXIDE, AND SIMETHICONE 15 ML: 200; 200; 20 SUSPENSION ORAL at 14:50

## 2024-10-12 ASSESSMENT — COLUMBIA-SUICIDE SEVERITY RATING SCALE - C-SSRS
1. IN THE PAST MONTH, HAVE YOU WISHED YOU WERE DEAD OR WISHED YOU COULD GO TO SLEEP AND NOT WAKE UP?: NO
6. HAVE YOU EVER DONE ANYTHING, STARTED TO DO ANYTHING, OR PREPARED TO DO ANYTHING TO END YOUR LIFE?: NO
2. HAVE YOU ACTUALLY HAD ANY THOUGHTS OF KILLING YOURSELF IN THE PAST MONTH?: NO

## 2024-10-12 ASSESSMENT — ACTIVITIES OF DAILY LIVING (ADL)
ADLS_ACUITY_SCORE: 37
ADLS_ACUITY_SCORE: 37

## 2024-10-12 NOTE — ED PROVIDER NOTES
Emergency Department Note      History of Present Illness     Chief Complaint   Allergic Reaction      HPI   Su Tinoco is a 69 year old female with a history of asthma who presents to the ED with an allergic reaction. She states she has a known allergy to peanuts and unknowingly ingested a product containing peanuts around 9790-3808. Shortly afterward she developed tongue swelling and hives over the ears/face. She then administered her EpiPen and took two Benadryl. She denies any throat swelling or shortness of breath. Of note, patient currently has Covid.     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  Arthritis  Colitis  Mild intermittent asthma  Other specified disorders of thyroid  Spinal stenosis of lumbar region  Spondylolisthesis at L5-S1 level  Trochanteric bursitis of right hip  Hypothyroidism  Diaphragmatic hernia  Colonic polyps  Anxiety    Medications   Albuterol  Citracal   Flonase  Xyzal   Synthroid  Singulair  Adipex  Desyrel    Surgical History   Past Surgical History:   Procedure Laterality Date    BREAST SURGERY      breast reduction    C/SECTION, LOW TRANSVERSE      , Low Transverse    COLONOSCOPY      EYE SURGERY      blepharoplasty    LAMINECTOMY, FUSION LUMBAR ONE LEVEL, COMBINED N/A 2019    Procedure: L4-S1 LAMINECTOMY, L5-S1 POSTERIOR SPINAL FUSION;  Surgeon: Wily Howell MD;  Location: [facility] OR    TONSILLECTOMY & ADENOIDECTOMY      TUBAL LIGATION      ZZC NONSPECIFIC PROCEDURE      Laparoscopies IUD infection    ZZC NONSPECIFIC PROCEDURE      removal of myxoid tumors fingers    ZZC NONSPECIFIC PROCEDURE      sphincterotomy of anus     Physical Exam     Patient Vitals for the past 24 hrs:   BP Pulse Resp SpO2   10/12/24 1445 (!) 153/74 69 -- 95 %   10/12/24 1430 (!) 166/82 77 -- 96 %   10/12/24 1420 (!) 175/88  -- 18 100 %     Physical Exam  Nursing note and vitals reviewed.  HENT:   Mouth/Throat: Moist mucous membranes. Posterior pharyngeal erythema without swelling.  Eyes: EOMI, nonicteric sclera  Cardiovascular: Normal rate, regular rhythm, no murmurs, rubs, or gallops  Pulmonary/Chest: Effort normal and breath sounds normal. No respiratory distress. No wheezes. No rales.   Musculoskeletal: Normal range of motion.   Neurological: Alert. Moves all extremities spontaneously.   Skin: Skin is warm and dry. No rash noted.         Diagnostics     Lab Results   Labs Ordered and Resulted from Time of ED Arrival to Time of ED Departure - No data to display    Imaging   No orders to display     Independent Interpretation   N/A    ED Course      Medications Administered   Medications   diphenhydrAMINE (BENADRYL) injection 25 mg (25 mg Intravenous $Given 10/12/24 1423)   methylPREDNISolone Na Suc (solu-MEDROL) injection 125 mg (125 mg Intravenous $Given 10/12/24 1423)   alum & mag hydroxide-simethicone (MAALOX) suspension 15 mL (15 mLs Oral $Given 10/12/24 1450)         Discussion of Management   None    ED Course   ED Course as of 10/12/24 1555   Sat Oct 12, 2024   1415 I obtained history and examined the patient as noted above.     1550 I rechecked the patient - desiring discharge.      Additional Documentation  None    Medical Decision Making / Diagnosis     CMS Diagnoses: None    MIPS       None    MDM   Su Tinoco is a 69 year old female who presents for evaluation of allergic reaction after peanut ingestion. Took epi prior to arrival.  Signs and symptoms are consistent with anaphylaxis. She looks well at discharge and symptoms have stabilized and improved.  We did watch here for ~2 hours prior to considering discharge.  She was treated here with medications as noted above.  Will send home with epipen, steroids, antihistamines.  Return of anaphylactic symptoms were discussed with patient and they were instructed to inject  epi-pen and call 911 should these symptoms occur.  Given the rapidity of resolution, lack of serious systemic symptoms, lack of respiratory difficulty and no oral or pharyngeal swelling, would not admit at this time for anaphylaxis. There is no signs of anaphylactic shock.        Disposition   The patient was discharged.     Diagnosis     ICD-10-CM    1. Anaphylaxis, initial encounter  T78.2XXA       2. COVID-19  U07.1       3. Exacerbation of asthma, unspecified asthma severity, unspecified whether persistent  J45.901            Discharge Medications   Discharge Medication List as of 10/12/2024  3:59 PM        START taking these medications    Details   !! EPINEPHrine (ANY BX GENERIC EQUIV) 0.3 MG/0.3ML injection 2-pack Inject 0.3 mLs (0.3 mg) into the muscle once as needed for anaphylaxis., Disp-1 each, R-1, E-Prescribe      predniSONE (DELTASONE) 20 MG tablet Take 2 tablets (40 mg) by mouth daily for 4 days., Disp-8 tablet, R-0, E-Prescribe       !! - Potential duplicate medications found. Please discuss with provider.          Scribe Disclosure:  I, Adela Galaviz, am serving as a scribe at 2:24 PM on 10/12/2024 to document services personally performed by Moncho Gandhi MD based on my observations and the provider's statements to me.        Moncho Gandhi MD  10/17/24 3316

## 2024-10-14 ENCOUNTER — PATIENT OUTREACH (OUTPATIENT)
Dept: INTERNAL MEDICINE | Facility: CLINIC | Age: 69
End: 2024-10-14
Payer: COMMERCIAL

## 2024-10-14 NOTE — TELEPHONE ENCOUNTER
"ED / Discharge Outreach Protocol    Patient Contact    Attempt # 1    Was call answered?  Yes.  \"May I please speak with <patient name>\"  Is patient available?   Yes          Transitions of Care Outreach  Chief Complaint   Patient presents with    Hospital F/U     Anaphylaxis, covid 19, asthma exacerbation        Most Recent Admission Date: 10/12/2024   Most Recent Admission Diagnosis:      Most Recent Discharge Date: 10/12/2024   Most Recent Discharge Diagnosis: Anaphylaxis, initial encounter - T78.2XXA  COVID-19 - U07.1  Exacerbation of asthma, unspecified asthma severity, unspecified whether persistent - J45.901     Transitions of Care Assessment    Discharge Assessment  How are you doing now that you are home?: Pt is doing well, no concerns.  How are your symptoms? (Red Flag symptoms escalate to triage hotline per guidelines): Improved  Do you know how to contact your clinic care team if you have future questions or changes to your health status? : Yes  Does the patient have their discharge instructions? : Yes  Does the patient have questions regarding their discharge instructions? : No  Were you started on any new medications or were there changes to any of your previous medications? : Yes (Prednisone, epi pen)  Does the patient have all of their medications?: Yes  Do you have questions regarding any of your medications? : No  Do you have all of your needed medical supplies or equipment (DME)?  (i.e. oxygen tank, CPAP, cane, etc.): Yes    Follow up Plan     Discharge Follow-Up  Discharge follow up appointment scheduled in alignment with recommended follow up timeframe or Transitions of Risk Category? (Low = within 30 days; Moderate= within 14 days; High= within 7 days): Yes  Discharge Follow Up Appointment Date: 10/25/24  Discharge Follow Up Appointment Scheduled with?: Primary Care Provider    Future Appointments   Date Time Provider Department Center   10/25/2024  8:30 AM Carolyn Jasmine, APRN CNP [facility] "       Outpatient Plan as outlined on AVS reviewed with patient.    For any urgent concerns, please contact our 24 hour nurse triage line: [phone])       Anitra Howell RN

## 2024-10-15 ENCOUNTER — TRANSFERRED RECORDS (OUTPATIENT)
Dept: HEALTH INFORMATION MANAGEMENT | Facility: CLINIC | Age: 69
End: 2024-10-15
Payer: COMMERCIAL

## 2024-10-16 ENCOUNTER — TRANSFERRED RECORDS (OUTPATIENT)
Dept: INTERNAL MEDICINE | Facility: CLINIC | Age: 69
End: 2024-10-16
Payer: COMMERCIAL

## 2024-10-18 ENCOUNTER — TRANSFERRED RECORDS (OUTPATIENT)
Dept: HEALTH INFORMATION MANAGEMENT | Facility: CLINIC | Age: 69
End: 2024-10-18
Payer: COMMERCIAL

## 2024-10-24 ENCOUNTER — MYC MEDICAL ADVICE (OUTPATIENT)
Dept: INTERNAL MEDICINE | Facility: CLINIC | Age: 69
End: 2024-10-24
Payer: COMMERCIAL

## 2024-10-24 ASSESSMENT — ASTHMA QUESTIONNAIRES
QUESTION_2 LAST FOUR WEEKS HOW OFTEN HAVE YOU HAD SHORTNESS OF BREATH: ONCE OR TWICE A WEEK
QUESTION_1 LAST FOUR WEEKS HOW MUCH OF THE TIME DID YOUR ASTHMA KEEP YOU FROM GETTING AS MUCH DONE AT WORK, SCHOOL OR AT HOME: NONE OF THE TIME
EMERGENCY_ROOM_LAST_YEAR_TOTAL: ONE
QUESTION_3 LAST FOUR WEEKS HOW OFTEN DID YOUR ASTHMA SYMPTOMS (WHEEZING, COUGHING, SHORTNESS OF BREATH, CHEST TIGHTNESS OR PAIN) WAKE YOU UP AT NIGHT OR EARLIER THAN USUAL IN THE MORNING: NOT AT ALL
QUESTION_5 LAST FOUR WEEKS HOW WOULD YOU RATE YOUR ASTHMA CONTROL: WELL CONTROLLED
QUESTION_4 LAST FOUR WEEKS HOW OFTEN HAVE YOU USED YOUR RESCUE INHALER OR NEBULIZER MEDICATION (SUCH AS ALBUTEROL): TWO OR THREE TIMES PER WEEK
ACT_TOTALSCORE: 21
ACT_TOTALSCORE: 21

## 2024-10-29 ENCOUNTER — VIRTUAL VISIT (OUTPATIENT)
Dept: INTERNAL MEDICINE | Facility: CLINIC | Age: 69
End: 2024-10-29
Payer: COMMERCIAL

## 2024-10-29 DIAGNOSIS — E66.3 OVERWEIGHT: Primary | ICD-10-CM

## 2024-10-29 DIAGNOSIS — R73.03 PREDIABETES: ICD-10-CM

## 2024-10-29 PROCEDURE — G2211 COMPLEX E/M VISIT ADD ON: HCPCS | Mod: 95

## 2024-10-29 PROCEDURE — 99214 OFFICE O/P EST MOD 30 MIN: CPT | Mod: 95

## 2024-10-29 RX ORDER — PHENTERMINE HYDROCHLORIDE 37.5 MG/1
37.5 TABLET ORAL
Qty: 90 TABLET | Refills: 0 | Status: SHIPPED | OUTPATIENT
Start: 2024-10-29

## 2024-10-29 RX ORDER — METFORMIN HYDROCHLORIDE 500 MG/1
TABLET, EXTENDED RELEASE ORAL
Qty: 159 TABLET | Refills: 0 | Status: SHIPPED | OUTPATIENT
Start: 2024-10-29 | End: 2025-01-27

## 2024-10-29 NOTE — PROGRESS NOTES
"Su is a 69 year old who is being evaluated via a billable video visit.    How would you like to obtain your AVS? [facility]  If the video visit is dropped, the invitation should be resent by: Text to cell phone: [phone]  Will anyone else be joining your video visit? No      Assessment & Plan     (E66.3) Overweight  (primary encounter diagnosis)  Comment: We started her on Phentermine 37.5MG in June of 2024. Unfortunately, she has not had any weight loss.  However, it sounds like she has only been taking this about 3 times per week I did tell her I would like her to take this daily to see if it provides further benefit..   She would like to try Metformin to see if this helps with weight loss.  I also offered weight management referral  Plan: Adult Comprehensive Weight Management         Referral            (R73.03) Prediabetes  Comment: Strong family history of T2DM in both sisters and father. Her A1C is 5.9%.   Would like to start Metformin. I will have her take Metformin 500MG XR once daily for 3 weeks and then increase to 500MG BID. She will send me a Redeem&Get message with an update in 6 weeks.   Plan: metFORMIN (GLUCOPHAGE XR) 500 MG 24 hr tablet,         Hemoglobin A1c         The longitudinal plan of care for the diagnosis(es)/condition(s) as documented were addressed during this visit. Due to the added complexity in care, I will continue to support Su in the subsequent management and with ongoing continuity of care.    BMI  Estimated body mass index is 28.72 kg/m  as calculated from the following:    Height as of 9/16/24: 1.575 m (5' 2\").    Weight as of 9/16/24: 71.2 kg (157 lb).   Weight management plan: Discussed healthy diet and exercise guidelines            Subjective   Su is a 69 year old, presenting for the following health issues:  Weight Problem      10/29/2024     8:59 AM   Additional Questions   Roomed by Yamel CARPENTER                 Objective           Vitals:  No vitals were " obtained today due to virtual visit.    Physical Exam   GENERAL: alert and no distress  EYES: Eyes grossly normal to inspection.  No discharge or erythema, or obvious scleral/conjunctival abnormalities.  RESP: No audible wheeze, cough, or visible cyanosis.    SKIN: Visible skin clear. No significant rash, abnormal pigmentation or lesions.  NEURO: Cranial nerves grossly intact.  Mentation and speech appropriate for age.  PSYCH: Appropriate affect, tone, and pace of words          Video-Visit Details    Type of service:  Video Visit   Originating Location (pt. Location): Home    Distant Location (provider location):  On-site  Platform used for Video Visit: Doroteo  Signed Electronically by: ANDRÉS May CNP

## 2024-11-04 ENCOUNTER — TRANSFERRED RECORDS (OUTPATIENT)
Dept: HEALTH INFORMATION MANAGEMENT | Facility: CLINIC | Age: 69
End: 2024-11-04
Payer: COMMERCIAL

## 2024-12-06 ENCOUNTER — MYC MEDICAL ADVICE (OUTPATIENT)
Dept: INTERNAL MEDICINE | Facility: CLINIC | Age: 69
End: 2024-12-06
Payer: COMMERCIAL

## 2024-12-09 NOTE — TELEPHONE ENCOUNTER
Last visit - 10/29/2024 virtual video visit with primary care provider.    Does patient need another appointment to discuss?    Please see patient's mychart message.     Please advise, thanks.    Johnathon Howell, Triage RN [facility]  11:29 AM 12/9/2024

## 2024-12-09 NOTE — TELEPHONE ENCOUNTER
Sent [facility] message to patient.    Thank you,  Johnathon Howell, Triage RN [facility]  1:09 PM 12/9/2024

## 2024-12-27 ENCOUNTER — TELEPHONE (OUTPATIENT)
Dept: INTERNAL MEDICINE | Facility: CLINIC | Age: 69
End: 2024-12-27

## 2024-12-27 DIAGNOSIS — R73.03 PREDIABETES: Primary | ICD-10-CM

## 2024-12-27 DIAGNOSIS — E66.3 OVERWEIGHT: ICD-10-CM

## 2024-12-27 NOTE — TELEPHONE ENCOUNTER
PRIOR AUTHORIZATION DENIED    Medication: MOUNJARO 2.5 MG/0.5ML SC SOAJ  Insurance Company: [employer] ([facility]) - Phone [phone] Fax [phone]  Denial Date: 12/27/2024  Denial Reason(s): Did not meet criteria      Appeal Information:       Patient Notified: No

## 2024-12-28 ENCOUNTER — MYC MEDICAL ADVICE (OUTPATIENT)
Dept: INTERNAL MEDICINE | Facility: CLINIC | Age: 69
End: 2024-12-28
Payer: COMMERCIAL

## 2024-12-30 NOTE — TELEPHONE ENCOUNTER
Last office visit - 12/27/2024    Prior authorization for Mounjaro on 12/27/2024 denied.    Please see patient's mychart message.     Please advise, thanks.    Selin Diego RN [facility]    7:44 AM 12/30/2024

## 2024-12-31 ENCOUNTER — TELEPHONE (OUTPATIENT)
Dept: INTERNAL MEDICINE | Facility: CLINIC | Age: 69
End: 2024-12-31
Payer: COMMERCIAL

## 2024-12-31 ENCOUNTER — MYC MEDICAL ADVICE (OUTPATIENT)
Dept: INTERNAL MEDICINE | Facility: CLINIC | Age: 69
End: 2024-12-31
Payer: COMMERCIAL

## 2024-12-31 DIAGNOSIS — E78.5 HYPERLIPIDEMIA LDL GOAL <130: ICD-10-CM

## 2024-12-31 NOTE — TELEPHONE ENCOUNTER
Myc message sent to patient in 12/28/24 TE:      Carolyn Valiente CNP has sent the prescription for Wegovy this morning!

## 2024-12-31 NOTE — TELEPHONE ENCOUNTER
PRIOR AUTHORIZATION DENIED    Medication: WEGOVY 0.25 MG/0.5ML SC SOAJ  Insurance Company: [employer] ([facility]) - Phone [phone] Fax [phone]  Denial Date: 12/31/2024  Denial Reason(s): Criteria NOT met      Appeal Information:   Patient Notified: No

## 2024-12-31 NOTE — TELEPHONE ENCOUNTER
I will re submit with correct diagnoses. Patient does have BMI of >27 and history of hyperlipidemia. Please let patient know I have resubmitted this for approval

## 2025-01-02 ENCOUNTER — TELEPHONE (OUTPATIENT)
Dept: INTERNAL MEDICINE | Facility: CLINIC | Age: 70
End: 2025-01-02
Payer: COMMERCIAL

## 2025-01-02 NOTE — TELEPHONE ENCOUNTER
Monserrat Deleon, RN   Registered Nurse     Telephone Encounter  Signed     Creation Time: 1/2/2025 11:40 AM     See Provider message below. Please resubmit PA for 2025 with the new information.            Addend     Tag     Copy  Monserrat Metcalf RN   Registered Nurse     Telephone Encounter  Signed     Creation Time: 12/31/2024 11:25 AM     Myc message sent to patient.            Addend     Tag     Copy  Carolyn Jasmine APRN CNP   Nurse Practitioner  Specialty: Internal Medicine     Telephone Encounter  Signed     Creation Time: 12/31/2024 11:11 AM     I will re submit with correct diagnoses. Patient does have BMI of >27 and history of hyperlipidemia. Please let patient know I have resubmitted this for approval

## 2025-01-02 NOTE — TELEPHONE ENCOUNTER
Prior Authorization Approval    Authorization Effective Date: 1/2/2025  Authorization Expiration Date: 7/2/2025  Medication: Wegovy 0.25mg/0.5ml   Approved Dose/Quantity:   Reference #:     Insurance Company: [employer] ([facility]) - Phone [phone] Fax [phone]  Expected CoPay:       CoPay Card Available:      Foundation Assistance Needed:    Which Pharmacy is filling the prescription (Not needed for infusion/clinic administered): [id number] - [place], [place] - [address]  Pharmacy Notified:  yes  Patient Notified:  yes- Pharmacy will contact patient when ready to /ship

## 2025-01-02 NOTE — TELEPHONE ENCOUNTER
Central Prior Authorization Team   Phone: [phone]    PA Initiation    Medication: Wegovy 0.25mg/0.5ml   Insurance Company: [employer] ([facility]) - Phone [phone] Fax [phone]  Pharmacy Filling the Rx: [id number] [place], [place] - [address] [address]  Filling Pharmacy Phone: [phone]  Filling Pharmacy Fax:    Start Date: 1/2/2025

## 2025-01-04 ENCOUNTER — MYC MEDICAL ADVICE (OUTPATIENT)
Dept: INTERNAL MEDICINE | Facility: CLINIC | Age: 70
End: 2025-01-04
Payer: COMMERCIAL

## 2025-01-06 NOTE — TELEPHONE ENCOUNTER
Would recommend stopping multivitamin completely as she does not need any vitamin d supplementation :)     Will see her for follow up in 1 month!   Happy healing!

## 2025-01-16 ENCOUNTER — TRANSFERRED RECORDS (OUTPATIENT)
Dept: HEALTH INFORMATION MANAGEMENT | Facility: CLINIC | Age: 70
End: 2025-01-16
Payer: COMMERCIAL

## 2025-01-22 DIAGNOSIS — Z98.1 ARTHRODESIS STATUS: ICD-10-CM

## 2025-01-22 DIAGNOSIS — M48.00 SPINAL STENOSIS: ICD-10-CM

## 2025-01-22 DIAGNOSIS — M81.0 SENILE OSTEOPOROSIS: ICD-10-CM

## 2025-01-22 DIAGNOSIS — M43.10 ACQUIRED SPONDYLOLISTHESIS: Primary | ICD-10-CM

## 2025-01-27 ENCOUNTER — TRANSFERRED RECORDS (OUTPATIENT)
Dept: HEALTH INFORMATION MANAGEMENT | Facility: CLINIC | Age: 70
End: 2025-01-27
Payer: COMMERCIAL

## 2025-01-28 ENCOUNTER — MYC MEDICAL ADVICE (OUTPATIENT)
Dept: INTERNAL MEDICINE | Facility: CLINIC | Age: 70
End: 2025-01-28
Payer: COMMERCIAL

## 2025-01-29 ENCOUNTER — TRANSFERRED RECORDS (OUTPATIENT)
Dept: HEALTH INFORMATION MANAGEMENT | Facility: CLINIC | Age: 70
End: 2025-01-29

## 2025-01-29 ENCOUNTER — LAB (OUTPATIENT)
Dept: LAB | Facility: CLINIC | Age: 70
End: 2025-01-29
Payer: COMMERCIAL

## 2025-01-29 DIAGNOSIS — M43.10 ACQUIRED SPONDYLOLISTHESIS: ICD-10-CM

## 2025-01-29 DIAGNOSIS — M48.00 SPINAL STENOSIS: ICD-10-CM

## 2025-01-29 DIAGNOSIS — Z98.1 ARTHRODESIS STATUS: ICD-10-CM

## 2025-01-29 DIAGNOSIS — M81.0 SENILE OSTEOPOROSIS: ICD-10-CM

## 2025-01-29 DIAGNOSIS — R73.03 PREDIABETES: ICD-10-CM

## 2025-01-29 LAB
ALBUMIN SERPL BCG-MCNC: 4.7 G/DL (ref 3.5–5.2)
CALCIUM SERPL-MCNC: 9.9 MG/DL (ref 8.8–10.4)
CREAT SERPL-MCNC: 0.82 MG/DL (ref 0.51–0.95)
EGFRCR SERPLBLD CKD-EPI 2021: 78 ML/MIN/1.73M2
EST. AVERAGE GLUCOSE BLD GHB EST-MCNC: 114 MG/DL
HBA1C MFR BLD: 5.6 % (ref 0–5.6)
PHOSPHATE SERPL-MCNC: 3.8 MG/DL (ref 2.5–4.5)
PTH-INTACT SERPL-MCNC: 41 PG/ML (ref 15–65)

## 2025-01-29 PROCEDURE — 82040 ASSAY OF SERUM ALBUMIN: CPT

## 2025-01-29 PROCEDURE — 83970 ASSAY OF PARATHORMONE: CPT

## 2025-01-29 PROCEDURE — 83036 HEMOGLOBIN GLYCOSYLATED A1C: CPT

## 2025-01-29 PROCEDURE — 84100 ASSAY OF PHOSPHORUS: CPT

## 2025-01-29 PROCEDURE — 36415 COLL VENOUS BLD VENIPUNCTURE: CPT

## 2025-01-29 PROCEDURE — 84080 ASSAY ALKALINE PHOSPHATASES: CPT | Mod: 90

## 2025-01-29 PROCEDURE — 99000 SPECIMEN HANDLING OFFICE-LAB: CPT

## 2025-01-29 PROCEDURE — 82310 ASSAY OF CALCIUM: CPT

## 2025-01-29 PROCEDURE — 82565 ASSAY OF CREATININE: CPT

## 2025-01-30 LAB — ALP BONE SERPL-MCNC: 7.7 UG/L

## 2025-02-01 LAB
DEPRECATED CALCIDIOL+CALCIFEROL SERPL-MC: <64 UG/L (ref 20–75)
VITAMIN D2 SERPL-MCNC: <5 UG/L
VITAMIN D3 SERPL-MCNC: 59 UG/L

## 2025-03-25 ENCOUNTER — OFFICE VISIT (OUTPATIENT)
Dept: INTERNAL MEDICINE | Facility: CLINIC | Age: 70
End: 2025-03-25
Payer: COMMERCIAL

## 2025-03-25 VITALS
TEMPERATURE: 98 F | HEART RATE: 76 BPM | SYSTOLIC BLOOD PRESSURE: 125 MMHG | DIASTOLIC BLOOD PRESSURE: 84 MMHG | WEIGHT: 152 LBS | BODY MASS INDEX: 28.03 KG/M2

## 2025-03-25 DIAGNOSIS — E03.9 ACQUIRED HYPOTHYROIDISM: ICD-10-CM

## 2025-03-25 DIAGNOSIS — R73.03 PREDIABETES: ICD-10-CM

## 2025-03-25 DIAGNOSIS — E78.5 HYPERLIPIDEMIA LDL GOAL <130: ICD-10-CM

## 2025-03-25 DIAGNOSIS — E66.3 OVERWEIGHT: Primary | ICD-10-CM

## 2025-03-25 DIAGNOSIS — Z13.0 SCREENING FOR IRON DEFICIENCY ANEMIA: ICD-10-CM

## 2025-03-25 PROCEDURE — 3079F DIAST BP 80-89 MM HG: CPT

## 2025-03-25 PROCEDURE — G2211 COMPLEX E/M VISIT ADD ON: HCPCS

## 2025-03-25 PROCEDURE — 3074F SYST BP LT 130 MM HG: CPT

## 2025-03-25 PROCEDURE — 99214 OFFICE O/P EST MOD 30 MIN: CPT

## 2025-03-25 RX ORDER — TIRZEPATIDE 2.5 MG/.5ML
2.5 INJECTION, SOLUTION SUBCUTANEOUS
Qty: 2 ML | Refills: 0 | Status: SHIPPED | OUTPATIENT
Start: 2025-03-25

## 2025-03-25 NOTE — PROGRESS NOTES
Assessment & Plan     (E66.3) Overweight  (primary encounter diagnosis)  (R73.03) Prediabetes  Comment: The patient recently changed insurance due to snf. She was on Wegovy for 2 months prior to snf and tolerated it well, resulting in a weight loss of approximately 10 pounds. However, her current insurance does not cover Wegovy but will cover Mounjaro. Additionally, she has increased her exercise frequency since retiring.  I will send rx for Mounjaro 2.5MG  Plan: MOUNJARO 2.5 MG/0.5ML SOAJ auto-injector pen,         Basic metabolic panel  (Ca, Cl, CO2, Creat,         Gluc, K, Na, BUN)            (E03.9) Acquired hypothyroidism  Comment:   Plan: TSH with free T4 reflex            (E78.5) Hyperlipidemia LDL goal <130  Comment:   Plan: Lipid panel reflex to direct LDL Fasting            (Z13.0) Screening for iron deficiency anemia  Comment:   Plan: CBC with platelets              The longitudinal plan of care for the diagnosis(es)/condition(s) as documented were addressed during this visit. Due to the added complexity in care, I will continue to support Su in the subsequent management and with ongoing continuity of care.      Le Blue is a 69 year old, presenting for the following health issues:  Follow Up      3/25/2025     1:46 PM   Additional Questions   Roomed by Yamel     History of Present Illness       Diabetes:   She presents for follow up of diabetes.  She is checking home blood glucose a few times a month.   She checks blood glucose before and after meals.  Blood glucose is sometimes over 200 and never under 70. She is aware of hypoglycemia symptoms including weakness and lethargy.    She has no concerns regarding her diabetes at this time.  She is having burning in feet.            She eats 4 or more servings of fruits and vegetables daily.She consumes 0 sweetened beverage(s) daily.She exercises with enough effort to increase her heart rate 30 to 60 minutes per day.  She exercises  with enough effort to increase her heart rate 4 days per week.   She is taking medications regularly.                  Objective    /84   Pulse 76   Temp 98  F (36.7  C) (Oral)   Wt 68.9 kg (152 lb)   LMP  (LMP Unknown)   BMI 28.03 kg/m    Body mass index is 28.03 kg/m .      Physical Exam  Constitutional:       General: She is not in acute distress.     Appearance: Normal appearance. She is not ill-appearing, toxic-appearing or diaphoretic.   HENT:      Head: Normocephalic and atraumatic.   Eyes:      Conjunctiva/sclera: Conjunctivae normal.   Cardiovascular:      Rate and Rhythm: Normal rate and regular rhythm.      Heart sounds: Normal heart sounds.   Pulmonary:      Effort: Pulmonary effort is normal.      Breath sounds: Normal breath sounds.   Skin:     General: Skin is warm and dry.   Neurological:      Mental Status: She is alert and oriented to person, place, and time.   Psychiatric:         Mood and Affect: Mood normal.         Behavior: Behavior normal.         Thought Content: Thought content normal.         Judgment: Judgment normal.             Signed Electronically by: ANDRÉS May CNP

## 2025-03-28 ENCOUNTER — TELEPHONE (OUTPATIENT)
Dept: INTERNAL MEDICINE | Facility: CLINIC | Age: 70
End: 2025-03-28
Payer: COMMERCIAL

## 2025-03-30 NOTE — TELEPHONE ENCOUNTER
Retail Pharmacy Prior Authorization Team   Phone: [phone]    PA Initiation    Medication: MOUNJARO 2.5 MG/0.5ML SC SOAJ  Insurance Company: [employer] Non-Specialty PA's - Phone [phone] Fax [phone]  Pharmacy Filling the Rx: [id number] [place], [place] - [address] AT [facility] [id number]  Filling Pharmacy Phone: [phone]  Filling Pharmacy Fax:    Start Date: 3/30/2025    Note: Due to record-high volumes, our turn-around time is taking longer than usual . We are currently 3  business days behind in the pools.   We are working diligently to submit all requests in a timely manner and in the order they are received. Please only flag TRUE URGENT requests as high priority to the pool at this time.   If you have questions on status of PA's,  please send a note/message in the active PA encounter and send back to the [facility] PA pool [[id number]].    If you have questions about the turn-around time or about our process, please reach out to our supervisor Erin Godwin.   Thank you!   RPPA (Retail Pharmacy Prior Authorization) team    [id number]

## 2025-03-31 NOTE — TELEPHONE ENCOUNTER
PRIOR AUTHORIZATION DENIED    Medication: MOUNJARO 2.5 MG/0.5ML SC SOAJ  Insurance Company: [employer] Non-Specialty PA's - Phone [phone] Fax [phone]  Denial Date: 3/30/2025  Denial Reason(s):             Appeal Information:         Patient Notified: No

## 2025-03-31 NOTE — TELEPHONE ENCOUNTER
Could consider trying phentermine. Phentermine is a medication commonly used for weight loss, and while individual results can vary, clinical studies typically show an average weight loss of about 5-10% of initial body weight over a period of 12 weeks

## 2025-03-31 NOTE — TELEPHONE ENCOUNTER
Advised patient of provider recommendation via telephone. Patient reports she would not be able to afford medication at 500-650$/ month. She asked if we could appeal, per PA it does not look like an appeal would be accepted as medication is reserved for those with T2DM. Patient also asked about ozempic, writer explained ozempic again is typically reserved for T2DM.     Are there any other medication that patient could try? She was not able to see nutritionist because insurance did not cover services.     Summer RN 11:23 AM March 31, 2025   [facility]

## 2025-03-31 NOTE — TELEPHONE ENCOUNTER
"Patient says she has tried phentermine in the past and \"it is like speed\". Patient doesn't want to spend $500-600 per month on Wegovy. Patient says she will try weight watchers again because she knows it is a metabolism issue. Patient says she eats 1200 a day in calories, goes on the treadmill daily and players pickleball on a regular basis.     Thank you,  Johnathon, Triage RN [place]    12:18 PM 3/31/2025         "

## 2025-03-31 NOTE — TELEPHONE ENCOUNTER
Please call patient and let her know that insurance has denied this prescription. There are alternatives through the [employer] or [employer] site where the prescriptions are around $650 per month or we can send it [email or web address] mail order pharmacy where prescriptions are around $500 per month

## 2025-04-16 ENCOUNTER — LAB (OUTPATIENT)
Dept: LAB | Facility: CLINIC | Age: 70
End: 2025-04-16
Payer: COMMERCIAL

## 2025-04-16 DIAGNOSIS — E03.9 ACQUIRED HYPOTHYROIDISM: ICD-10-CM

## 2025-04-16 DIAGNOSIS — R73.03 PREDIABETES: ICD-10-CM

## 2025-04-16 DIAGNOSIS — Z13.0 SCREENING FOR IRON DEFICIENCY ANEMIA: ICD-10-CM

## 2025-04-16 DIAGNOSIS — E78.5 HYPERLIPIDEMIA LDL GOAL <130: ICD-10-CM

## 2025-04-16 LAB
ANION GAP SERPL CALCULATED.3IONS-SCNC: 10 MMOL/L (ref 7–15)
BUN SERPL-MCNC: 13.9 MG/DL (ref 8–23)
CALCIUM SERPL-MCNC: 9.6 MG/DL (ref 8.8–10.4)
CHLORIDE SERPL-SCNC: 104 MMOL/L (ref 98–107)
CHOLEST SERPL-MCNC: 235 MG/DL
CREAT SERPL-MCNC: 0.87 MG/DL (ref 0.51–0.95)
EGFRCR SERPLBLD CKD-EPI 2021: 72 ML/MIN/1.73M2
ERYTHROCYTE [DISTWIDTH] IN BLOOD BY AUTOMATED COUNT: 13.5 % (ref 10–15)
FASTING STATUS PATIENT QL REPORTED: YES
FASTING STATUS PATIENT QL REPORTED: YES
GLUCOSE SERPL-MCNC: 98 MG/DL (ref 70–99)
HCO3 SERPL-SCNC: 28 MMOL/L (ref 22–29)
HCT VFR BLD AUTO: 38.9 % (ref 35–47)
HDLC SERPL-MCNC: 101 MG/DL
HGB BLD-MCNC: 12.9 G/DL (ref 11.7–15.7)
LDLC SERPL CALC-MCNC: 115 MG/DL
MCH RBC QN AUTO: 30.7 PG (ref 26.5–33)
MCHC RBC AUTO-ENTMCNC: 33.2 G/DL (ref 31.5–36.5)
MCV RBC AUTO: 93 FL (ref 78–100)
NONHDLC SERPL-MCNC: 134 MG/DL
PLATELET # BLD AUTO: 288 10E3/UL (ref 150–450)
POTASSIUM SERPL-SCNC: 3.9 MMOL/L (ref 3.4–5.3)
RBC # BLD AUTO: 4.2 10E6/UL (ref 3.8–5.2)
SODIUM SERPL-SCNC: 142 MMOL/L (ref 135–145)
TRIGL SERPL-MCNC: 93 MG/DL
TSH SERPL DL<=0.005 MIU/L-ACNC: 0.96 UIU/ML (ref 0.3–4.2)
WBC # BLD AUTO: 5.8 10E3/UL (ref 4–11)

## 2025-04-16 PROCEDURE — 80048 BASIC METABOLIC PNL TOTAL CA: CPT

## 2025-04-16 PROCEDURE — 84443 ASSAY THYROID STIM HORMONE: CPT

## 2025-04-16 PROCEDURE — 80061 LIPID PANEL: CPT

## 2025-04-16 PROCEDURE — 85027 COMPLETE CBC AUTOMATED: CPT

## 2025-04-16 PROCEDURE — 36415 COLL VENOUS BLD VENIPUNCTURE: CPT

## 2025-05-07 ENCOUNTER — MYC MEDICAL ADVICE (OUTPATIENT)
Dept: INTERNAL MEDICINE | Facility: CLINIC | Age: 70
End: 2025-05-07
Payer: COMMERCIAL

## 2025-05-07 NOTE — TELEPHONE ENCOUNTER
"Su is calling back. See also her Rage[employer] message.     Says had epidural scheduled today at [facility] for back pain /86, blood sugar at 172 .  Was cancelled by them due to vitals and she was told to call her PCP.   Has taken 4 shots of the GLP 1 compounded meds (see her RageTankt message) and feels this may be contributing to her symptoms but is not sure.     172 blood sugar after drinking zevia, skinny pop, 2 rice cakes and piece of Iranian toast with light butter. Also coffee with almond creamer which has some sugar.     Had a alcoholic selzer last night and woke up at 1 am.     Not feeling good, she says \"blah\". 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     Denies any pain or discomfort at this time. Will hold GLP 1 shot.     She is looking for advise from caregiver on what to do, if she should be seen or help with GLP 1 issue.     Rin Torre R.N.      Rin Torre R.N.    "

## 2025-05-07 NOTE — TELEPHONE ENCOUNTER
Please see patient's mychart message below.  States she will look into [facility] [employer] regarding getting Wegovy directly form the .    Please advise, thanks.

## 2025-05-07 NOTE — TELEPHONE ENCOUNTER
Please see patient's mychart message below.    Next step?  Send in prescription for Zepbound to [facility]?    Please advise, thanks.

## 2025-05-08 ENCOUNTER — TELEPHONE (OUTPATIENT)
Dept: INTERNAL MEDICINE | Facility: CLINIC | Age: 70
End: 2025-05-08

## 2025-05-08 ENCOUNTER — OFFICE VISIT (OUTPATIENT)
Dept: INTERNAL MEDICINE | Facility: CLINIC | Age: 70
End: 2025-05-08
Payer: COMMERCIAL

## 2025-05-08 ENCOUNTER — NURSE TRIAGE (OUTPATIENT)
Dept: NURSING | Facility: CLINIC | Age: 70
End: 2025-05-08
Payer: COMMERCIAL

## 2025-05-08 VITALS
OXYGEN SATURATION: 99 % | RESPIRATION RATE: 16 BRPM | DIASTOLIC BLOOD PRESSURE: 78 MMHG | SYSTOLIC BLOOD PRESSURE: 152 MMHG | HEIGHT: 61 IN | TEMPERATURE: 96.7 F | HEART RATE: 68 BPM | BODY MASS INDEX: 28.7 KG/M2 | WEIGHT: 152 LBS

## 2025-05-08 DIAGNOSIS — E66.3 OVERWEIGHT: Primary | ICD-10-CM

## 2025-05-08 DIAGNOSIS — I10 ESSENTIAL HYPERTENSION: ICD-10-CM

## 2025-05-08 LAB
CREAT UR-MCNC: 21.8 MG/DL
MICROALBUMIN UR-MCNC: <12 MG/L
MICROALBUMIN/CREAT UR: NORMAL MG/G{CREAT}

## 2025-05-08 RX ORDER — AMLODIPINE BESYLATE 5 MG/1
5 TABLET ORAL DAILY
Qty: 90 TABLET | Refills: 0 | Status: SHIPPED | OUTPATIENT
Start: 2025-05-08 | End: 2025-05-08

## 2025-05-08 RX ORDER — LOSARTAN POTASSIUM 50 MG/1
50 TABLET ORAL DAILY
Qty: 90 TABLET | Refills: 0 | Status: SHIPPED | OUTPATIENT
Start: 2025-05-08 | End: 2025-05-08

## 2025-05-08 RX ORDER — LOSARTAN POTASSIUM 50 MG/1
50 TABLET ORAL DAILY
Qty: 90 TABLET | Refills: 0 | Status: SHIPPED | OUTPATIENT
Start: 2025-05-08

## 2025-05-08 ASSESSMENT — ANXIETY QUESTIONNAIRES
8. IF YOU CHECKED OFF ANY PROBLEMS, HOW DIFFICULT HAVE THESE MADE IT FOR YOU TO DO YOUR WORK, TAKE CARE OF THINGS AT HOME, OR GET ALONG WITH OTHER PEOPLE?: NOT DIFFICULT AT ALL
IF YOU CHECKED OFF ANY PROBLEMS ON THIS QUESTIONNAIRE, HOW DIFFICULT HAVE THESE PROBLEMS MADE IT FOR YOU TO DO YOUR WORK, TAKE CARE OF THINGS AT HOME, OR GET ALONG WITH OTHER PEOPLE: NOT DIFFICULT AT ALL
GAD7 TOTAL SCORE: 3
2. NOT BEING ABLE TO STOP OR CONTROL WORRYING: SEVERAL DAYS
7. FEELING AFRAID AS IF SOMETHING AWFUL MIGHT HAPPEN: NOT AT ALL
4. TROUBLE RELAXING: NOT AT ALL
7. FEELING AFRAID AS IF SOMETHING AWFUL MIGHT HAPPEN: NOT AT ALL
GAD7 TOTAL SCORE: 3
6. BECOMING EASILY ANNOYED OR IRRITABLE: NOT AT ALL
3. WORRYING TOO MUCH ABOUT DIFFERENT THINGS: SEVERAL DAYS
1. FEELING NERVOUS, ANXIOUS, OR ON EDGE: SEVERAL DAYS
5. BEING SO RESTLESS THAT IT IS HARD TO SIT STILL: NOT AT ALL
GAD7 TOTAL SCORE: 3

## 2025-05-08 ASSESSMENT — ASTHMA QUESTIONNAIRES
QUESTION_4 LAST FOUR WEEKS HOW OFTEN HAVE YOU USED YOUR RESCUE INHALER OR NEBULIZER MEDICATION (SUCH AS ALBUTEROL): ONCE A WEEK OR LESS
ACT_TOTALSCORE: 23
QUESTION_2 LAST FOUR WEEKS HOW OFTEN HAVE YOU HAD SHORTNESS OF BREATH: NOT AT ALL
QUESTION_5 LAST FOUR WEEKS HOW WOULD YOU RATE YOUR ASTHMA CONTROL: WELL CONTROLLED
QUESTION_1 LAST FOUR WEEKS HOW MUCH OF THE TIME DID YOUR ASTHMA KEEP YOU FROM GETTING AS MUCH DONE AT WORK, SCHOOL OR AT HOME: NONE OF THE TIME
QUESTION_3 LAST FOUR WEEKS HOW OFTEN DID YOUR ASTHMA SYMPTOMS (WHEEZING, COUGHING, SHORTNESS OF BREATH, CHEST TIGHTNESS OR PAIN) WAKE YOU UP AT NIGHT OR EARLIER THAN USUAL IN THE MORNING: NOT AT ALL

## 2025-05-08 NOTE — TELEPHONE ENCOUNTER
"Nurse Triage SBAR    Is this a 2nd Level Triage? NO    Situation: hypertension    Background: mother is living with patient, has dementia with some behaviors.  Having a lot family stressors.  Recently retired.  Had been on Wygovey but insurance denied this.  Weight Watchers has compound pharmacy is now taking semiglutide at 0.25 mg now.  Last Thursday was last dose.    Was scheduled for injection yesterday but provider would not do it due to the patient's elevated blood pressure of 164/86 and headache.  Blood sugar yesterday was 173.    Blood pressure readings last night at home were:  173/105, 165/96, 164/107.  Didn't record pulses but remembers them being between 60's-90's.     Assessment: Did not wake up with headache but now has one above her eyebrows/across her forehead.  Rates it as constant 3/10.  Did take Tylenol around 7 am today.  Having heart pounding, during the night when she was woken up.  Not sleeping well.  Advil did not help headache yesterday.  Fatigued feeling.  \"I don't feel well\".    Protocol Recommended Disposition:   Go to ED Now    Recommendation: Care advice given per protocol.  Recommend patient be evaluated in the Emergency Room now.  Patient verbalized understanding information.  She stated that she will call her  and she may either to to the ER or the Urgency Room.  She did not need information on ER locations.          Does the patient meet one of the following criteria for ADS visit consideration? 16+ years old, with an [facility] PCP     TIP  Providers, please consider if this condition is appropriate for management at one of our Acute and Diagnostic Services sites.     If patient is a good candidate, please use dotphrase <dot>triageresponse and select Refer to ADS to document.    Reason for Disposition   [1] Systolic BP  >= 160 OR Diastolic >= 100 AND [2] cardiac or neurologic symptoms (e.g., chest pain, difficulty breathing, unsteady gait, blurred vision)    Additional " "Information   Negative: Difficult to awaken or acting confused (e.g., disoriented, slurred speech)   Negative: Severe difficulty breathing (e.g., struggling for each breath, speaks in single words)   Negative: [1] Weakness of the face, arm or leg on one side of the body AND [2] new onset   Negative: [1] Numbness (i.e., loss of sensation) of the face, arm or leg on one side of the body AND [2] new onset   Negative: [1] Chest pain lasts > 5 minutes AND [2] history of heart disease  (i.e., heart attack, bypass surgery, angina, angioplasty, CHF)   Negative: [1] Chest pain AND [2] took nitrogylcerin AND [3] pain was not relieved   Negative: Sounds like a life-threatening emergency to the triager   Negative: Symptom is main concern  (e.g., headache, chest pain)   Negative: Low blood pressure is main concern    Answer Assessment - Initial Assessment Questions  1. BLOOD PRESSURE: \"What is the blood pressure?\" \"Did you take at least two measurements 5 minutes apart?\"      162/94, pulse was95  2. ONSET: \"When did you take your blood pressure?\"      Today at 0651.  3. HOW: \"How did you obtain the blood pressure?\" (e.g., visiting nurse, automatic home BP monitor)      Home machine  4. HISTORY: \"Do you have a history of high blood pressure?\"      no  5. MEDICATIONS: \"Are you taking any medications for blood pressure?\" \"Have you missed any doses recently?\"      Not on any blood pressure medications  6. OTHER SYMPTOMS: \"Do you have any symptoms?\" (e.g., headache, chest pain, blurred vision, difficulty breathing, weakness)      Headache, denies chest pain, feels tired and weak  7. PREGNANCY: \"Is there any chance you are pregnant?\" \"When was your last menstrual period?\"      In menopause since 52 years old    Protocols used: High Blood Pressure-A-AH    "

## 2025-05-08 NOTE — PROGRESS NOTES
"  Assessment & Plan     (E66.3) Overweight  (primary encounter diagnosis)  Comment: She would like to switch from weight watchers to myself for prescribing her GLP-1. She recently finished the 0.25MG dose of Wegovy and has taken one week of the 0.5MG dose. I will send rx for 0.5MG of Wegovy.   Plan: Semaglutide-Weight Management (WEGOVY) 0.5         MG/0.5ML pen            (I10) Essential hypertension  Comment: The patient presents with recent episodes of elevated blood pressure, likely attributable to increased stress levels. Her blood pressure in the clinic today is measured at 152/78 mmHg. She has no prior history of hypertension; however, she has been experiencing significant stress, particularly related to caring for her mother, who has dementia. This situation has been increasingly burdensome. Home blood pressure readings have been consistently elevated, with values recorded as follows: 165/96 mmHg, 164/107 mmHg, 173/96 mmHg, 159/98 mmHg, 164/86 mmHg, and 173/96 mmHg. Given the circumstances and her strong family history of hypertension, I recommend initiating treatment for her elevated blood pressure. She plans to monitor her blood pressure regularly and will purchase a new blood pressure cuff for home use. Losartan 50 mg will be prescribed, and she is scheduled for a follow-up appointment in four weeks.  Plan: Albumin Random Urine Quantitative with Creat         Ratio, losartan (COZAAR) 50 MG tablet,       The longitudinal plan of care for the diagnosis(es)/condition(s) as documented were addressed during this visit. Due to the added complexity in care, I will continue to support Su in the subsequent management and with ongoing continuity of care.        BMI  Estimated body mass index is 28.49 kg/m  as calculated from the following:    Height as of this encounter: 1.556 m (5' 1.25\").    Weight as of this encounter: 68.9 kg (152 lb).         Le Blue is a 70 year old, presenting for the following " "health issues:  Hypertension        5/8/2025     8:54 AM   Additional Questions   Roomed by Yamel     History of Present Illness       Headaches:   Since the patient's last clinic visit, headaches are: no change  The patient is getting headaches:  1  She is able to do normal daily activities when she has a migraine.  The patient is taking the following rescue/relief medications:  Tylenol   Patient states \"I get some relief\" from the rescue/relief medications.   The patient is taking the following medications to prevent migraines:  No medications to prevent migraines  In the past 4 weeks, the patient has gone to an Urgent Care or Emergency Room 0 times times due to headaches.   She is taking medications regularly.                Objective    BP (!) 152/78   Pulse 68   Temp (!) 96.7  F (35.9  C) (Tympanic)   Resp 16   Ht 1.556 m (5' 1.25\")   Wt 68.9 kg (152 lb)   LMP  (LMP Unknown)   SpO2 99%   BMI 28.49 kg/m    Body mass index is 28.49 kg/m .      Physical Exam  Constitutional:       General: She is not in acute distress.     Appearance: Normal appearance. She is not ill-appearing, toxic-appearing or diaphoretic.   HENT:      Head: Normocephalic and atraumatic.   Eyes:      Conjunctiva/sclera: Conjunctivae normal.   Cardiovascular:      Rate and Rhythm: Normal rate and regular rhythm.      Heart sounds: Normal heart sounds.   Pulmonary:      Effort: Pulmonary effort is normal.      Breath sounds: Normal breath sounds.   Skin:     General: Skin is warm and dry.   Neurological:      Mental Status: She is alert and oriented to person, place, and time.   Psychiatric:         Mood and Affect: Mood normal.         Behavior: Behavior normal.         Thought Content: Thought content normal.         Judgment: Judgment normal.           Signed Electronically by: ANDRÉS May CNP    "

## 2025-05-08 NOTE — TELEPHONE ENCOUNTER
Attempt # 1  Called Phone # [phone]      Was Call answered? No     Non-detailed voicemail left on May 8, 2025 10:08 AM to call clinic at: [phone].     On Call Back:     Please relay primary care provider's message. Clarify what patient is looking for.      Thank you,  Johnathon, Triage RN [place] [place]    10:08 AM 5/8/2025

## 2025-05-08 NOTE — TELEPHONE ENCOUNTER
Patient was seen for appointment today. Losartan was sent to the wrong pharmacy. Resent to [facility] per patient request.

## 2025-05-09 ENCOUNTER — MYC MEDICAL ADVICE (OUTPATIENT)
Dept: INTERNAL MEDICINE | Facility: CLINIC | Age: 70
End: 2025-05-09
Payer: COMMERCIAL

## 2025-05-09 ENCOUNTER — RESULTS FOLLOW-UP (OUTPATIENT)
Dept: INTERNAL MEDICINE | Facility: CLINIC | Age: 70
End: 2025-05-09

## 2025-05-12 NOTE — TELEPHONE ENCOUNTER
Mild JENSEN can definitely contribute to daytime somnolence and fatigue! I am glad she had the sleep study done.

## 2025-05-31 SDOH — HEALTH STABILITY: PHYSICAL HEALTH: ON AVERAGE, HOW MANY DAYS PER WEEK DO YOU ENGAGE IN MODERATE TO STRENUOUS EXERCISE (LIKE A BRISK WALK)?: 5 DAYS

## 2025-05-31 SDOH — HEALTH STABILITY: PHYSICAL HEALTH: ON AVERAGE, HOW MANY MINUTES DO YOU ENGAGE IN EXERCISE AT THIS LEVEL?: 70 MIN

## 2025-05-31 ASSESSMENT — ASTHMA QUESTIONNAIRES
QUESTION_3 LAST FOUR WEEKS HOW OFTEN DID YOUR ASTHMA SYMPTOMS (WHEEZING, COUGHING, SHORTNESS OF BREATH, CHEST TIGHTNESS OR PAIN) WAKE YOU UP AT NIGHT OR EARLIER THAN USUAL IN THE MORNING: NOT AT ALL
QUESTION_5 LAST FOUR WEEKS HOW WOULD YOU RATE YOUR ASTHMA CONTROL: WELL CONTROLLED
QUESTION_1 LAST FOUR WEEKS HOW MUCH OF THE TIME DID YOUR ASTHMA KEEP YOU FROM GETTING AS MUCH DONE AT WORK, SCHOOL OR AT HOME: NONE OF THE TIME
QUESTION_2 LAST FOUR WEEKS HOW OFTEN HAVE YOU HAD SHORTNESS OF BREATH: ONCE OR TWICE A WEEK
QUESTION_4 LAST FOUR WEEKS HOW OFTEN HAVE YOU USED YOUR RESCUE INHALER OR NEBULIZER MEDICATION (SUCH AS ALBUTEROL): ONCE A WEEK OR LESS
ACT_TOTALSCORE: 22

## 2025-05-31 ASSESSMENT — SOCIAL DETERMINANTS OF HEALTH (SDOH): HOW OFTEN DO YOU GET TOGETHER WITH FRIENDS OR RELATIVES?: MORE THAN THREE TIMES A WEEK

## 2025-06-02 ENCOUNTER — PATIENT OUTREACH (OUTPATIENT)
Dept: INTERNAL MEDICINE | Facility: CLINIC | Age: 70
End: 2025-06-02
Payer: COMMERCIAL

## 2025-06-02 PROBLEM — R12 HEARTBURN: Status: ACTIVE | Noted: 2024-10-18

## 2025-06-02 PROBLEM — F41.9 ANXIETY: Status: RESOLVED | Noted: 2020-03-02 | Resolved: 2025-06-02

## 2025-06-02 NOTE — TELEPHONE ENCOUNTER
Patient Quality Outreach    Patient is due for the following:   Asthma  -  AAP  Hypertension -  BP check  Physical Annual Wellness Visit      Topic Date Due    Pneumococcal Vaccine (1 of 2 - PCV) Never done    Zoster (Shingles) Vaccine (1 of 2) Never done    COVID-19 Vaccine (5 - 2024-25 season) 09/01/2024       Action(s) Taken:   Patient has upcoming appointment, these items will be addressed at that time.    Type of outreach:    Chart review performed, no outreach needed.    Questions for provider review:    None         Melanie L Rule, LPN  Chart routed to self.

## 2025-06-05 ENCOUNTER — OFFICE VISIT (OUTPATIENT)
Dept: INTERNAL MEDICINE | Facility: CLINIC | Age: 70
End: 2025-06-05
Payer: COMMERCIAL

## 2025-06-05 VITALS
TEMPERATURE: 97.9 F | WEIGHT: 154 LBS | RESPIRATION RATE: 16 BRPM | OXYGEN SATURATION: 98 % | HEART RATE: 73 BPM | HEIGHT: 62 IN | BODY MASS INDEX: 28.34 KG/M2 | SYSTOLIC BLOOD PRESSURE: 138 MMHG | DIASTOLIC BLOOD PRESSURE: 80 MMHG

## 2025-06-05 DIAGNOSIS — J45.20 MILD INTERMITTENT ASTHMA, UNCOMPLICATED: ICD-10-CM

## 2025-06-05 DIAGNOSIS — E55.9 VITAMIN D DEFICIENCY: ICD-10-CM

## 2025-06-05 DIAGNOSIS — Z00.00 ENCOUNTER FOR MEDICARE ANNUAL WELLNESS EXAM: Primary | ICD-10-CM

## 2025-06-05 DIAGNOSIS — E03.9 ACQUIRED HYPOTHYROIDISM: ICD-10-CM

## 2025-06-05 DIAGNOSIS — G47.00 INSOMNIA, UNSPECIFIED TYPE: ICD-10-CM

## 2025-06-05 DIAGNOSIS — I10 ESSENTIAL HYPERTENSION: ICD-10-CM

## 2025-06-05 DIAGNOSIS — R73.03 PREDIABETES: ICD-10-CM

## 2025-06-05 RX ORDER — ALBUTEROL SULFATE 90 UG/1
INHALANT RESPIRATORY (INHALATION)
Qty: 18 G | Refills: 3 | Status: SHIPPED | OUTPATIENT
Start: 2025-06-05

## 2025-06-05 RX ORDER — BUDESONIDE AND FORMOTEROL FUMARATE DIHYDRATE 80; 4.5 UG/1; UG/1
2 AEROSOL RESPIRATORY (INHALATION)
Qty: 10.2 G | Refills: 5 | Status: SHIPPED | OUTPATIENT
Start: 2025-06-05

## 2025-06-05 RX ORDER — BUDESONIDE AND FORMOTEROL FUMARATE DIHYDRATE 80; 4.5 UG/1; UG/1
2 AEROSOL RESPIRATORY (INHALATION)
COMMUNITY
End: 2025-06-05

## 2025-06-05 RX ORDER — LEVOTHYROXINE SODIUM 88 UG/1
88 TABLET ORAL DAILY
Qty: 90 TABLET | Refills: 3 | Status: SHIPPED | OUTPATIENT
Start: 2025-06-05

## 2025-06-05 RX ORDER — TRAZODONE HYDROCHLORIDE 100 MG/1
100 TABLET ORAL AT BEDTIME
Qty: 90 TABLET | Refills: 3 | Status: SHIPPED | OUTPATIENT
Start: 2025-06-05

## 2025-06-05 RX ORDER — LOSARTAN POTASSIUM 25 MG/1
25 TABLET ORAL DAILY
Qty: 90 TABLET | Refills: 0 | Status: SHIPPED | OUTPATIENT
Start: 2025-06-05

## 2025-06-05 ASSESSMENT — ANXIETY QUESTIONNAIRES
IF YOU CHECKED OFF ANY PROBLEMS ON THIS QUESTIONNAIRE, HOW DIFFICULT HAVE THESE PROBLEMS MADE IT FOR YOU TO DO YOUR WORK, TAKE CARE OF THINGS AT HOME, OR GET ALONG WITH OTHER PEOPLE: NOT DIFFICULT AT ALL
1. FEELING NERVOUS, ANXIOUS, OR ON EDGE: SEVERAL DAYS
2. NOT BEING ABLE TO STOP OR CONTROL WORRYING: SEVERAL DAYS
GAD7 TOTAL SCORE: 5
GAD7 TOTAL SCORE: 5
3. WORRYING TOO MUCH ABOUT DIFFERENT THINGS: SEVERAL DAYS
5. BEING SO RESTLESS THAT IT IS HARD TO SIT STILL: SEVERAL DAYS
8. IF YOU CHECKED OFF ANY PROBLEMS, HOW DIFFICULT HAVE THESE MADE IT FOR YOU TO DO YOUR WORK, TAKE CARE OF THINGS AT HOME, OR GET ALONG WITH OTHER PEOPLE?: NOT DIFFICULT AT ALL
GAD7 TOTAL SCORE: 5
4. TROUBLE RELAXING: SEVERAL DAYS
6. BECOMING EASILY ANNOYED OR IRRITABLE: NOT AT ALL
7. FEELING AFRAID AS IF SOMETHING AWFUL MIGHT HAPPEN: NOT AT ALL
7. FEELING AFRAID AS IF SOMETHING AWFUL MIGHT HAPPEN: NOT AT ALL

## 2025-06-05 NOTE — PROGRESS NOTES
Preventive Care Visit  [facility]  ANDRÉS May CNP, Internal Medicine  Jun 5, 2025      Assessment & Plan     (Z00.00) Encounter for Medicare annual wellness exam  (primary encounter diagnosis)  Comment: Annual visit   Plan:     (I10) Essential hypertension  Comment: The patient has not yet initiated Losartan therapy and is questioning the necessity of the medication. Her current blood pressure reading in the office is 138/80 mmHg. She reports home blood pressure measurements ranging from 110-140/80 mmHg. I recommended that she begin taking Losartan at a dose of 25 mg. She will have BMP rechecked in 4 weeks.  Plan: Basic metabolic panel  (Ca, Cl, CO2, Creat,         Gluc, K, Na, BUN), losartan (COZAAR) 25 MG         tablet            (G47.00) Insomnia, unspecified type  Comment:   Plan: traZODone (DESYREL) 100 MG tablet            (E03.9) Acquired hypothyroidism  Comment: TSH within normal limits. Continue Synthroid 88MCG.  Plan: levothyroxine (SYNTHROID/LEVOTHROID) 88 MCG         tablet            (J45.20) Mild intermittent asthma, uncomplicated  Comment:   Plan: albuterol (VENTOLIN HFA) 108 (90 Base) MCG/ACT         inhaler            (R73.03) Prediabetes  Comment:   Plan: Hemoglobin A1c            (E55.9) Vitamin D deficiency  Comment: Followed by Endocrinology for history of osteoporosis   Plan: Vitamin D Deficiency          The longitudinal plan of care for the diagnosis(es)/condition(s) as documented were addressed during this visit. Due to the added complexity in care, I will continue to support Su in the subsequent management and with ongoing continuity of care.      Counseling  Appropriate preventive services were addressed with this patient via screening, questionnaire, or discussion as appropriate for fall prevention, nutrition, physical activity, Tobacco-use cessation, social engagement, weight loss and cognition.  Checklist reviewing preventive services available has  been given to the patient.  Reviewed patient's diet, addressing concerns and/or questions.   She is at risk for psychosocial distress and has been provided with information to reduce risk.   Discussed possible causes of fatigue.           Le Blue is a 70 year old, presenting for the following:  No chief complaint on file.        6/5/2025    11:08 AM   Additional Questions   Roomed by Yamel CARPENTER      Advance Care Planning    Document on file is a Health Care Directive or POLST.        5/31/2025   General Health   How would you rate your overall physical health? Good   Feel stress (tense, anxious, or unable to sleep) Rather much   (!) STRESS CONCERN      5/31/2025   Nutrition   Diet: Low salt    Low fat/cholesterol    Carbohydrate counting    Gluten-free/reduced       Multiple values from one day are sorted in reverse-chronological order         5/31/2025   Exercise   Days per week of moderate/strenous exercise 5 days   Average minutes spent exercising at this level 70 min         5/31/2025   Social Factors   Frequency of gathering with friends or relatives More than three times a week   Worry food won't last until get money to buy more No   Food not last or not have enough money for food? No   Do you have housing? (Housing is defined as stable permanent housing and does not include staying outside in a car, in a tent, in an abandoned building, in an overnight shelter, or couch-surfing.) Yes   Are you worried about losing your housing? No   Lack of transportation? No   Unable to get utilities (heat,electricity)? No         5/31/2025   Fall Risk   Fallen 2 or more times in the past year? No   Trouble with walking or balance? No          5/31/2025   Activities of Daily Living- Home Safety   Needs help with the following daily activites None of the above   Safety concerns in the home None of the above         5/31/2025   Dental   Dentist two times every year? Yes         5/31/2025   Hearing Screening    Hearing concerns? None of the above         5/31/2025   Driving Risk Screening   Patient/family members have concerns about driving No         5/31/2025   General Alertness/Fatigue Screening   Have you been more tired than usual lately? (!) YES         5/31/2025   Urinary Incontinence Screening   Bothered by leaking urine in past 6 months No         Today's PHQ-2 Score:       6/4/2025    12:56 PM   PHQ-2 ( 1999 Pf[employer])   Q1: Little interest or pleasure in doing things 0   Q2: Feeling down, depressed or hopeless 0   PHQ-2 Score 0    Q1: Little interest or pleasure in doing things Not at all   Q2: Feeling down, depressed or hopeless Not at all   PHQ-2 Score 0       Patient-reported           5/31/2025   Substance Use   Alcohol more than 3/day or more than 7/wk No   Do you have a current opioid prescription? No   How severe/bad is pain from 1 to 10? 2/10   Do you use any other substances recreationally? No     Social History     Tobacco Use    Smoking status: Never     Passive exposure: Never    Smokeless tobacco: Never    Tobacco comments:     second hand smoke by mother during childhood   Vaping Use    Vaping status: Never Used   Substance Use Topics    Alcohol use: Yes     Comment: twice weekly, 1 glass of wine    Drug use: No           11/25/2022   LAST FHS-7 RESULTS   1st degree relative breast or ovarian cancer Yes   Any relative bilateral breast cancer Yes   Any male have breast cancer No   Any ONE woman have BOTH breast AND ovarian cancer Yes   Any woman with breast cancer before 50yrs Yes   2 or more relatives with breast AND/OR ovarian cancer Unknown   2 or more relatives with breast AND/OR bowel cancer Unknown     -her sister Zoe had bilateral breast cancer.  BCRAT: her lifetime risk is 13.5%, so she does not qualify for high risk screening                History of abnormal Pap smear: No - age 65 or older with adequate negative prior screening test results (3 consecutive negative cytology results, 2  consecutive negative cotesting results, or 2 consecutive negative HrHPV test results within 10 years, with the most recent test occurring within the recommended screening interval for the test used)       ASCVD Risk   The ASCVD Risk score (Devonte SILVEIRA, et al., 2019) failed to calculate for the following reasons:    The valid HDL cholesterol range is 20 to 100 mg/dL            Reviewed and updated as needed this visit by Provider                    Current providers sharing in care for this patient include:  Patient Care Team:  Carolyn Jasmine APRN CNP as PCP - General (Internal Medicine)  Carolyn Jasmine APRN CNP as Assigned PCP  Shana Mckeon MD as Hospitalist (Endocrinology, Diabetes, and Metabolism)  Goltz, Bennett Ezra, PA-C as Assigned Neuroscience Provider    The following health maintenance items are reviewed in Epic and correct as of today:  Health Maintenance   Topic Date Due    PNEUMOCOCCAL VACCINE 50+ YEARS (1 of 2 - PCV) Never done    ZOSTER VACCINE (1 of 2) Never done    RSV VACCINE (1 - Risk 60-74 years 1-dose series) Never done    ASTHMA ACTION PLAN  11/12/2021    COVID-19 VACCINE (5 - 2024-25 season) 09/01/2024    MEDICARE ANNUAL WELLNESS VISIT  05/02/2025    MAMMO SCREENING  08/28/2025    INFLUENZA VACCINE (Season Ended) 09/01/2025    ASTHMA CONTROL TEST  12/05/2025    ANNUAL REVIEW OF HM ORDERS  12/27/2025    BMP  04/16/2026    TSH W/FREE T4 REFLEX  04/16/2026    COLORECTAL CANCER SCREENING  04/30/2026    BRONWYN ASSESSMENT  06/05/2026    FALL RISK ASSESSMENT  06/05/2026    DTAP/TDAP/TD VACCINE (4 - Td or Tdap) 08/01/2027    DIABETES SCREENING  04/16/2028    LIPID  04/16/2030    ADVANCE CARE PLANNING  06/05/2030    DEXA  07/31/2039    HEPATITIS C SCREENING  Completed    MIGRAINE ACTION PLAN  Completed    PHQ-2 (once per calendar year)  Completed    HPV VACCINE  Aged Out    MENINGITIS VACCINE  Aged Out    PAP  Discontinued          Objective    Exam  /80   Pulse 73   Temp 97.9  F  "(36.6  C) (Tympanic)   Resp 16   Ht 1.568 m (5' 1.75\")   Wt 69.9 kg (154 lb)   LMP  (LMP Unknown)   SpO2 98%   BMI 28.40 kg/m     Estimated body mass index is 28.4 kg/m  as calculated from the following:    Height as of this encounter: 1.568 m (5' 1.75\").    Weight as of this encounter: 69.9 kg (154 lb).        Physical Exam  Constitutional:       General: She is not in acute distress.     Appearance: Normal appearance. She is not ill-appearing, toxic-appearing or diaphoretic.   HENT:      Head: Normocephalic and atraumatic.   Eyes:      Conjunctiva/sclera: Conjunctivae normal.   Cardiovascular:      Rate and Rhythm: Normal rate and regular rhythm.      Heart sounds: Normal heart sounds.   Pulmonary:      Effort: Pulmonary effort is normal.      Breath sounds: Normal breath sounds.   Skin:     General: Skin is warm and dry.   Neurological:      Mental Status: She is alert and oriented to person, place, and time.   Psychiatric:         Mood and Affect: Mood normal.         Behavior: Behavior normal.         Thought Content: Thought content normal.         Judgment: Judgment normal.            6/5/2025   Mini Cog   Mini-Cog Not Completed (choose reason) Patient declines   Clock Draw Score 2 Normal   3 Item Recall 3 objects recalled   Mini Cog Total Score 5         Vision Screen         Signed Electronically by: ANDRÉS May CNP    Answers submitted by the patient for this visit:  Patient Health Questionnaire (G7) (Submitted on 6/5/2025)  BRONWYN 7 TOTAL SCORE: 5    "

## 2025-06-26 ENCOUNTER — TELEPHONE (OUTPATIENT)
Dept: INTERNAL MEDICINE | Facility: CLINIC | Age: 70
End: 2025-06-26
Payer: COMMERCIAL

## 2025-06-26 NOTE — TELEPHONE ENCOUNTER
PA for Mounjaro 2.5 mg inj pen has been electronically denied.     Carolyn wrote appeal letter.     Will fax to PA department.

## 2025-07-01 NOTE — TELEPHONE ENCOUNTER
Appeal started.     Medication Appeal Initiation     Medication: MOUNJARO 2.5 MG/0.5ML SC SOAJ  Appeal Start Date:  6/29/2025  Insurance Company: [employer]  Insurance Phone: [phone]  Comments:   Appeal faxed

## 2025-07-10 ENCOUNTER — TELEPHONE (OUTPATIENT)
Dept: INTERNAL MEDICINE | Facility: CLINIC | Age: 70
End: 2025-07-10
Payer: COMMERCIAL

## 2025-07-10 ENCOUNTER — TRANSFERRED RECORDS (OUTPATIENT)
Dept: HEALTH INFORMATION MANAGEMENT | Facility: CLINIC | Age: 70
End: 2025-07-10
Payer: COMMERCIAL

## 2025-07-10 ENCOUNTER — TRANSCRIBE ORDERS (OUTPATIENT)
Dept: OTHER | Age: 70
End: 2025-07-10

## 2025-07-10 DIAGNOSIS — J31.0 CHRONIC RHINITIS: ICD-10-CM

## 2025-07-10 DIAGNOSIS — J38.3 SPASMODIC DYSPHONIA: Primary | ICD-10-CM

## 2025-07-10 NOTE — TELEPHONE ENCOUNTER
Provider message addendum to 03/28/2025 TE:    Carolyn Jasmine APRN CNP Nurse Practitioner Signed3:38 PM       Rx for zepbound sent to [facility]. Have her see me for VV in 3-4 weeks please        Patient returns call to clinic. Advised of Rx. Advised of need for follow up appointment. Patient is taking a trip this summer and will not start medication until August. Future appointment scheduled for approximately 4 weeks after start date.    Appointments in Next Year      Sep 04, 2025 9:00 AM  (Arrive by 8:55 AM)  Provider Visit with ANDRÉS Maravilla CNP  [facility] ([facility] [place] ) [phone]

## 2025-07-11 ENCOUNTER — TELEPHONE (OUTPATIENT)
Dept: OTOLARYNGOLOGY | Facility: CLINIC | Age: 70
End: 2025-07-11
Payer: COMMERCIAL

## 2025-07-11 NOTE — TELEPHONE ENCOUNTER
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 [facility] [place]     Action Taken: Other: ENT    Travel Screening: Not Applicable     Date of Service:

## 2025-07-14 ENCOUNTER — PATIENT OUTREACH (OUTPATIENT)
Dept: CARE COORDINATION | Facility: CLINIC | Age: 70
End: 2025-07-14
Payer: COMMERCIAL

## 2025-07-15 NOTE — TELEPHONE ENCOUNTER
Patient needs to schedule 2 visits for botox      First one:    Appointment Type: New Throat Panel  Provider: Dr. Jauregui ([facility]) [place]   Appt date: next open  Specialty phone number: [phone]   Additional appointment(s) needed:   Additional Notes: consult prior to botox      Second one  RTN ENT botox with Dr Cordero sometime after so we can get prior auth after consult

## 2025-07-29 ENCOUNTER — PATIENT OUTREACH (OUTPATIENT)
Dept: CARE COORDINATION | Facility: CLINIC | Age: 70
End: 2025-07-29
Payer: COMMERCIAL

## 2025-08-11 ENCOUNTER — TRANSFERRED RECORDS (OUTPATIENT)
Dept: ADMINISTRATIVE | Facility: CLINIC | Age: 70
End: 2025-08-11
Payer: COMMERCIAL

## 2025-08-23 DIAGNOSIS — I10 ESSENTIAL HYPERTENSION: ICD-10-CM

## 2025-08-25 RX ORDER — LOSARTAN POTASSIUM 25 MG/1
25 TABLET ORAL DAILY
Qty: 90 TABLET | Refills: 4 | Status: SHIPPED | OUTPATIENT
Start: 2025-08-25

## 2025-09-04 ENCOUNTER — VIRTUAL VISIT (OUTPATIENT)
Dept: INTERNAL MEDICINE | Facility: CLINIC | Age: 70
End: 2025-09-04
Payer: COMMERCIAL

## 2025-09-04 DIAGNOSIS — G47.30 SLEEP APNEA, UNSPECIFIED TYPE: ICD-10-CM

## 2025-09-04 DIAGNOSIS — R73.03 PREDIABETES: ICD-10-CM

## 2025-09-04 DIAGNOSIS — I10 ESSENTIAL HYPERTENSION: Primary | ICD-10-CM

## (undated) DEVICE — POSITIONER PT PRONESAFE HEAD REST W/DERMAPROX INSERT 40599

## (undated) DEVICE — SUCTION FRAZIER 12FR W/OBTURATOR 33120

## (undated) DEVICE — ESU ELEC BLADE 2.75" COATED/INSULATED E1455

## (undated) DEVICE — SU VICRYL 2-0 CT-2 CR 8X18" J726D

## (undated) DEVICE — SU VICRYL 1 CT-1 27" UND J261H

## (undated) DEVICE — DRAPE C-ARMOR 5 SIDED 5523

## (undated) DEVICE — TUBING SUCTION MINISQUAIR SMOKE EVAC NONSTERILE SQNS20018-01

## (undated) DEVICE — SOL NACL 0.9% IRRIG 1000ML BOTTLE 07138-09

## (undated) DEVICE — DRAPE MAYO STAND 23X54 8337

## (undated) DEVICE — Device

## (undated) DEVICE — ESU GROUND PAD UNIVERSAL W/O CORD

## (undated) DEVICE — SYR 05ML SLIP TIP W/O NDL 309647

## (undated) DEVICE — SPONGE SURGIFOAM 100 1974

## (undated) DEVICE — PREP CHLORAPREP 26ML TINTED ORANGE  260815

## (undated) DEVICE — SU VICRYL 1 MO-4 18" J702D

## (undated) DEVICE — SU VICRYL 2-0 CT-2 27" J333H

## (undated) DEVICE — DRAPE C-ARM 60X42" 1013

## (undated) DEVICE — PACK LARGE SPINE SNE15LSFSE

## (undated) DEVICE — RX SURGIFLO HEMOSTATIC MATRIX W/THROMBIN 8ML 2994

## (undated) DEVICE — ESU ELEC BLADE 4" COATED

## (undated) DEVICE — GLOVE PROTEXIS MICRO 7.0  2D73PM70

## (undated) DEVICE — GLOVE PROTEXIS W/NEU-THERA 7.5  2D73TE75

## (undated) DEVICE — SYR 03ML LL W/O NDL 309657

## (undated) DEVICE — GLOVE PROTEXIS W/NEU-THERA 7.0  2D73TE70

## (undated) DEVICE — GLOVE PROTEXIS BLUE W/NEU-THERA 7.0  2D73EB70

## (undated) DEVICE — GLOVE PROTEXIS BLUE W/NEU-THERA 8.0  2D73EB80

## (undated) DEVICE — DRAPE VARI-LENS II MICROSCOPE 52X150" 6120VL2

## (undated) DEVICE — SU MONOCRYL 4-0 PS-2 18" UND Y496G

## (undated) DEVICE — ESU PENCIL W/HOLSTER

## (undated) DEVICE — DRAPE STERI TOWEL LG 1010

## (undated) DEVICE — KIT SURGICAL TURNOVER FVSD-01D

## (undated) DEVICE — DRAPE SHEET REV FOLD 3/4 9349

## (undated) DEVICE — DRAPE MICROSOPE ZEISS 52X150" 6120

## (undated) DEVICE — SUCTION FRAZIER 12FR W/HANDLE K73

## (undated) DEVICE — SU VICRYL 0 UR-6 27" J603H

## (undated) DEVICE — MIDAS REX DISSECTING TOOL  14MH30

## (undated) DEVICE — SOL WATER IRRIG 1000ML BOTTLE 2F7114

## (undated) DEVICE — LINEN TOWEL PACK X5 5464

## (undated) DEVICE — KIT PATIENT JACKSON 1 SPK10118

## (undated) DEVICE — PACK SET-UP STD 9102

## (undated) RX ORDER — GABAPENTIN 300 MG/1
CAPSULE ORAL
Status: DISPENSED
Start: 2019-09-26

## (undated) RX ORDER — CELECOXIB 200 MG/1
CAPSULE ORAL
Status: DISPENSED
Start: 2019-09-26

## (undated) RX ORDER — CEFAZOLIN SODIUM 1 G/3ML
INJECTION, POWDER, FOR SOLUTION INTRAMUSCULAR; INTRAVENOUS
Status: DISPENSED
Start: 2019-09-26

## (undated) RX ORDER — EPINEPHRINE 1 MG/ML
INJECTION, SOLUTION INTRAMUSCULAR; SUBCUTANEOUS
Status: DISPENSED
Start: 2019-09-26

## (undated) RX ORDER — PHENYLEPHRINE HCL IN 0.9% NACL 50MG/250ML
PLASTIC BAG, INJECTION (ML) INTRAVENOUS
Status: DISPENSED
Start: 2019-09-26

## (undated) RX ORDER — DIPHENHYDRAMINE HYDROCHLORIDE 50 MG/ML
INJECTION INTRAMUSCULAR; INTRAVENOUS
Status: DISPENSED
Start: 2019-09-26

## (undated) RX ORDER — VANCOMYCIN HYDROCHLORIDE 1 G/20ML
INJECTION, POWDER, LYOPHILIZED, FOR SOLUTION INTRAVENOUS
Status: DISPENSED
Start: 2019-09-26

## (undated) RX ORDER — HYDROMORPHONE HYDROCHLORIDE 1 MG/ML
INJECTION, SOLUTION INTRAMUSCULAR; INTRAVENOUS; SUBCUTANEOUS
Status: DISPENSED
Start: 2019-09-26

## (undated) RX ORDER — LIDOCAINE HYDROCHLORIDE 20 MG/ML
INJECTION, SOLUTION EPIDURAL; INFILTRATION; INTRACAUDAL; PERINEURAL
Status: DISPENSED
Start: 2019-09-26

## (undated) RX ORDER — PROPOFOL 10 MG/ML
INJECTION, EMULSION INTRAVENOUS
Status: DISPENSED
Start: 2019-09-26

## (undated) RX ORDER — OXYCODONE HCL 10 MG/1
TABLET, FILM COATED, EXTENDED RELEASE ORAL
Status: DISPENSED
Start: 2019-09-26

## (undated) RX ORDER — ACETAMINOPHEN 325 MG/1
TABLET ORAL
Status: DISPENSED
Start: 2019-09-26

## (undated) RX ORDER — CEFAZOLIN SODIUM 2 G/100ML
INJECTION, SOLUTION INTRAVENOUS
Status: DISPENSED
Start: 2019-09-26

## (undated) RX ORDER — BUPIVACAINE HYDROCHLORIDE 5 MG/ML
INJECTION, SOLUTION EPIDURAL; INTRACAUDAL
Status: DISPENSED
Start: 2019-09-26

## (undated) RX ORDER — FENTANYL CITRATE 50 UG/ML
INJECTION, SOLUTION INTRAMUSCULAR; INTRAVENOUS
Status: DISPENSED
Start: 2019-09-26

## (undated) RX ORDER — GLYCOPYRROLATE 0.2 MG/ML
INJECTION, SOLUTION INTRAMUSCULAR; INTRAVENOUS
Status: DISPENSED
Start: 2019-09-26

## (undated) RX ORDER — NEOSTIGMINE METHYLSULFATE 1 MG/ML
VIAL (ML) INJECTION
Status: DISPENSED
Start: 2019-09-26

## (undated) RX ORDER — DEXAMETHASONE SODIUM PHOSPHATE 4 MG/ML
INJECTION, SOLUTION INTRA-ARTICULAR; INTRALESIONAL; INTRAMUSCULAR; INTRAVENOUS; SOFT TISSUE
Status: DISPENSED
Start: 2019-09-26

## (undated) RX ORDER — ONDANSETRON 2 MG/ML
INJECTION INTRAMUSCULAR; INTRAVENOUS
Status: DISPENSED
Start: 2019-09-26